# Patient Record
Sex: MALE | Race: WHITE | Employment: FULL TIME | URBAN - METROPOLITAN AREA
[De-identification: names, ages, dates, MRNs, and addresses within clinical notes are randomized per-mention and may not be internally consistent; named-entity substitution may affect disease eponyms.]

---

## 2017-03-10 ENCOUNTER — MED REC SCAN ONLY (OUTPATIENT)
Dept: INTERNAL MEDICINE CLINIC | Facility: CLINIC | Age: 37
End: 2017-03-10

## 2017-03-13 ENCOUNTER — TELEPHONE (OUTPATIENT)
Dept: INTERNAL MEDICINE CLINIC | Facility: CLINIC | Age: 37
End: 2017-03-13

## 2017-03-13 NOTE — TELEPHONE ENCOUNTER
Dr. Lizz Adam reviewed Wellness labs and recommended pt f/u in the office for physical.    Grant Elias for pt [facility] and schedule.

## 2017-08-18 PROBLEM — J30.89 ALLERGIC RHINITIS CAUSED BY MOLD: Status: ACTIVE | Noted: 2017-08-18

## 2017-11-30 ENCOUNTER — APPOINTMENT (OUTPATIENT)
Dept: GENERAL RADIOLOGY | Facility: HOSPITAL | Age: 37
End: 2017-11-30
Attending: EMERGENCY MEDICINE
Payer: COMMERCIAL

## 2017-11-30 ENCOUNTER — HOSPITAL ENCOUNTER (EMERGENCY)
Facility: HOSPITAL | Age: 37
Discharge: HOME | End: 2017-11-30
Attending: EMERGENCY MEDICINE
Payer: COMMERCIAL

## 2017-11-30 VITALS
BODY MASS INDEX: 29.4 KG/M2 | WEIGHT: 210 LBS | OXYGEN SATURATION: 99 % | HEART RATE: 98 BPM | SYSTOLIC BLOOD PRESSURE: 121 MMHG | DIASTOLIC BLOOD PRESSURE: 66 MMHG | HEIGHT: 71 IN | RESPIRATION RATE: 16 BRPM | TEMPERATURE: 97 F

## 2017-11-30 DIAGNOSIS — R13.19 ESOPHAGEAL DYSPHAGIA: Primary | ICD-10-CM

## 2017-11-30 PROCEDURE — 99284 EMERGENCY DEPT VISIT MOD MDM: CPT

## 2017-11-30 PROCEDURE — 99283 EMERGENCY DEPT VISIT LOW MDM: CPT

## 2017-11-30 PROCEDURE — 71020 XR CHEST PA + LAT CHEST (CPT=71020): CPT | Performed by: EMERGENCY MEDICINE

## 2017-11-30 RX ORDER — PANTOPRAZOLE SODIUM 20 MG/1
20 TABLET, DELAYED RELEASE ORAL DAILY
Qty: 30 TABLET | Refills: 0 | Status: SHIPPED | OUTPATIENT
Start: 2017-11-30 | End: 2017-12-30

## 2017-11-30 NOTE — ED PROVIDER NOTES
Patient Seen in: [facility] Emergency Department    History   Patient presents with:  FB in Throat (GI, respiratory)    Stated Complaint: FOREIGN BODY ESOPHAGUS    HPI    61-year-old white male who presents to the emergency room today for complaint and appears in no acute discomfort or distress. Vital signs are stable he is afebrile    Head is normocephalic atraumatic conjunctiva is clear. Sclerae anicteric. Neck is supple.   Oral exam shows no posterior pharyngeal erythema or tonsillar adenopath Jia Vargas MD  [address] Johnny Lopez [phone]    In 2 days          Medications Prescribed:  Discharge Medication List as of 11/30/2017  4:25 PM    START taking these medications    Pantoprazole Sodium 20 MG Oral Tab EC  Take 1 tablet (2

## 2017-11-30 NOTE — ED INITIAL ASSESSMENT (HPI)
Patient had a piece of chicken for dinner, felt a piece of bone get stuck when he swallowed. Patient was able to eat and drink without difficulty this morning.   + pain when swallowing today at lunch. Now feels something is stuck again.    Continues to be

## 2017-11-30 NOTE — ED NOTES
Pt reevaluated by er physician. Informed of his test report and plan of care. Pt verbalizing understanding.

## 2018-01-06 ENCOUNTER — APPOINTMENT (OUTPATIENT)
Dept: CT IMAGING | Facility: HOSPITAL | Age: 38
End: 2018-01-06
Attending: EMERGENCY MEDICINE
Payer: COMMERCIAL

## 2018-01-06 ENCOUNTER — HOSPITAL ENCOUNTER (OUTPATIENT)
Facility: HOSPITAL | Age: 38
Setting detail: OBSERVATION
Discharge: HOME | End: 2018-01-07
Attending: EMERGENCY MEDICINE | Admitting: INTERNAL MEDICINE
Payer: COMMERCIAL

## 2018-01-06 DIAGNOSIS — K52.9 GASTROENTERITIS: Primary | ICD-10-CM

## 2018-01-06 DIAGNOSIS — K12.2 UVULITIS: ICD-10-CM

## 2018-01-06 DIAGNOSIS — R11.2 INTRACTABLE VOMITING WITH NAUSEA, UNSPECIFIED VOMITING TYPE: ICD-10-CM

## 2018-01-06 DIAGNOSIS — R10.9 ABDOMINAL PAIN, ACUTE: ICD-10-CM

## 2018-01-06 DIAGNOSIS — E86.0 DEHYDRATION: ICD-10-CM

## 2018-01-06 LAB
ALBUMIN SERPL-MCNC: 4.6 G/DL (ref 3.5–4.8)
ALP LIVER SERPL-CCNC: 55 U/L (ref 45–117)
ALT SERPL-CCNC: 32 U/L (ref 17–63)
AST SERPL-CCNC: 18 U/L (ref 15–41)
BASOPHILS # BLD AUTO: 0.05 X10(3) UL (ref 0–0.1)
BASOPHILS NFR BLD AUTO: 0.3 %
BILIRUB SERPL-MCNC: 0.7 MG/DL (ref 0.1–2)
BUN BLD-MCNC: 21 MG/DL (ref 8–20)
CALCIUM BLD-MCNC: 9.6 MG/DL (ref 8.3–10.3)
CHLORIDE: 104 MMOL/L (ref 101–111)
CO2: 20 MMOL/L (ref 22–32)
CREAT BLD-MCNC: 1.46 MG/DL (ref 0.7–1.3)
EOSINOPHIL # BLD AUTO: 0.08 X10(3) UL (ref 0–0.3)
EOSINOPHIL NFR BLD AUTO: 0.5 %
ERYTHROCYTE [DISTWIDTH] IN BLOOD BY AUTOMATED COUNT: 12.5 % (ref 11.5–16)
GLUCOSE BLD-MCNC: 132 MG/DL (ref 70–99)
HCT VFR BLD AUTO: 48.4 % (ref 37–53)
HGB BLD-MCNC: 17.1 G/DL (ref 13–17)
IMMATURE GRANULOCYTE COUNT: 0.07 X10(3) UL (ref 0–1)
IMMATURE GRANULOCYTE RATIO %: 0.4 %
LYMPHOCYTES # BLD AUTO: 0.58 X10(3) UL (ref 0.9–4)
LYMPHOCYTES NFR BLD AUTO: 3.3 %
M PROTEIN MFR SERPL ELPH: 8.3 G/DL (ref 6.1–8.3)
MCH RBC QN AUTO: 30.2 PG (ref 27–33.2)
MCHC RBC AUTO-ENTMCNC: 35.3 G/DL (ref 31–37)
MCV RBC AUTO: 85.5 FL (ref 80–99)
MONOCYTES # BLD AUTO: 0.78 X10(3) UL (ref 0.1–0.6)
MONOCYTES NFR BLD AUTO: 4.4 %
NEUTROPHIL ABS PRELIM: 16.13 X10 (3) UL (ref 1.3–6.7)
NEUTROPHILS # BLD AUTO: 16.13 X10(3) UL (ref 1.3–6.7)
NEUTROPHILS NFR BLD AUTO: 91.1 %
PLATELET # BLD AUTO: 386 10(3)UL (ref 150–450)
POTASSIUM SERPL-SCNC: 3.6 MMOL/L (ref 3.6–5.1)
RBC # BLD AUTO: 5.66 X10(6)UL (ref 4.3–5.7)
RED CELL DISTRIBUTION WIDTH-SD: 38.8 FL (ref 35.1–46.3)
SODIUM SERPL-SCNC: 138 MMOL/L (ref 136–144)
WBC # BLD AUTO: 17.7 X10(3) UL (ref 4–13)

## 2018-01-06 PROCEDURE — 99220 INITIAL OBSERVATION CARE,LEVL III: CPT | Performed by: INTERNAL MEDICINE

## 2018-01-06 PROCEDURE — 74177 CT ABD & PELVIS W/CONTRAST: CPT | Performed by: EMERGENCY MEDICINE

## 2018-01-06 RX ORDER — PSEUDOEPHEDRINE HCL 120 MG/1
240 TABLET, FILM COATED, EXTENDED RELEASE ORAL DAILY
Status: DISCONTINUED | OUTPATIENT
Start: 2018-01-06 | End: 2018-01-07

## 2018-01-06 RX ORDER — MORPHINE SULFATE 2 MG/ML
2 INJECTION, SOLUTION INTRAMUSCULAR; INTRAVENOUS EVERY 30 MIN PRN
Status: DISCONTINUED | OUTPATIENT
Start: 2018-01-06 | End: 2018-01-07

## 2018-01-06 RX ORDER — SODIUM CHLORIDE 9 MG/ML
INJECTION, SOLUTION INTRAVENOUS CONTINUOUS
Status: DISCONTINUED | OUTPATIENT
Start: 2018-01-06 | End: 2018-01-07

## 2018-01-06 RX ORDER — METHYLPREDNISOLONE SODIUM SUCCINATE 125 MG/2ML
125 INJECTION, POWDER, LYOPHILIZED, FOR SOLUTION INTRAMUSCULAR; INTRAVENOUS ONCE
Status: COMPLETED | OUTPATIENT
Start: 2018-01-06 | End: 2018-01-06

## 2018-01-06 RX ORDER — ONDANSETRON 2 MG/ML
INJECTION INTRAMUSCULAR; INTRAVENOUS
Status: DISPENSED
Start: 2018-01-06 | End: 2018-01-06

## 2018-01-06 RX ORDER — METHYLPREDNISOLONE SODIUM SUCCINATE 125 MG/2ML
60 INJECTION, POWDER, LYOPHILIZED, FOR SOLUTION INTRAMUSCULAR; INTRAVENOUS EVERY 8 HOURS
Status: DISCONTINUED | OUTPATIENT
Start: 2018-01-06 | End: 2018-01-07

## 2018-01-06 RX ORDER — ONDANSETRON 2 MG/ML
4 INJECTION INTRAMUSCULAR; INTRAVENOUS ONCE
Status: COMPLETED | OUTPATIENT
Start: 2018-01-06 | End: 2018-01-06

## 2018-01-06 RX ORDER — ACETAMINOPHEN 325 MG/1
650 TABLET ORAL EVERY 6 HOURS PRN
Status: DISCONTINUED | OUTPATIENT
Start: 2018-01-06 | End: 2018-01-07

## 2018-01-06 RX ORDER — CETIRIZINE HYDROCHLORIDE 10 MG/1
10 TABLET ORAL DAILY
Status: DISCONTINUED | OUTPATIENT
Start: 2018-01-06 | End: 2018-01-07

## 2018-01-06 RX ORDER — ONDANSETRON 2 MG/ML
4 INJECTION INTRAMUSCULAR; INTRAVENOUS EVERY 4 HOURS PRN
Status: DISCONTINUED | OUTPATIENT
Start: 2018-01-06 | End: 2018-01-07

## 2018-01-06 RX ORDER — FAMOTIDINE 10 MG/ML
20 INJECTION, SOLUTION INTRAVENOUS ONCE
Status: COMPLETED | OUTPATIENT
Start: 2018-01-06 | End: 2018-01-06

## 2018-01-06 RX ORDER — METOCLOPRAMIDE HYDROCHLORIDE 5 MG/ML
10 INJECTION INTRAMUSCULAR; INTRAVENOUS EVERY 8 HOURS PRN
Status: DISCONTINUED | OUTPATIENT
Start: 2018-01-06 | End: 2018-01-07

## 2018-01-06 RX ORDER — DIPHENHYDRAMINE HYDROCHLORIDE 50 MG/ML
25 INJECTION INTRAMUSCULAR; INTRAVENOUS ONCE
Status: COMPLETED | OUTPATIENT
Start: 2018-01-06 | End: 2018-01-06

## 2018-01-06 RX ORDER — IBUPROFEN 600 MG/1
600 TABLET ORAL EVERY 4 HOURS PRN
Status: DISCONTINUED | OUTPATIENT
Start: 2018-01-06 | End: 2018-01-07

## 2018-01-06 RX ORDER — METOCLOPRAMIDE HYDROCHLORIDE 5 MG/ML
10 INJECTION INTRAMUSCULAR; INTRAVENOUS ONCE
Status: COMPLETED | OUTPATIENT
Start: 2018-01-06 | End: 2018-01-06

## 2018-01-06 RX ORDER — IBUPROFEN 200 MG
200 TABLET ORAL EVERY 4 HOURS PRN
Status: DISCONTINUED | OUTPATIENT
Start: 2018-01-06 | End: 2018-01-07

## 2018-01-06 RX ORDER — FLUTICASONE PROPIONATE 50 MCG
2 SPRAY, SUSPENSION (ML) NASAL DAILY
Status: DISCONTINUED | OUTPATIENT
Start: 2018-01-06 | End: 2018-01-07

## 2018-01-06 RX ORDER — SIMETHICONE 80 MG
80 TABLET,CHEWABLE ORAL
Status: DISCONTINUED | OUTPATIENT
Start: 2018-01-07 | End: 2018-01-07

## 2018-01-06 RX ORDER — DIPHENHYDRAMINE HCL 25 MG
25 CAPSULE ORAL EVERY 4 HOURS PRN
Status: DISCONTINUED | OUTPATIENT
Start: 2018-01-06 | End: 2018-01-07

## 2018-01-06 RX ORDER — FAMOTIDINE 10 MG/ML
20 INJECTION, SOLUTION INTRAVENOUS 2 TIMES DAILY
Status: DISCONTINUED | OUTPATIENT
Start: 2018-01-06 | End: 2018-01-07

## 2018-01-06 RX ORDER — LORATADINE AND PSEUDOEPHEDRINE 10; 240 MG/1; MG/1
1 TABLET, EXTENDED RELEASE ORAL
Status: DISCONTINUED | OUTPATIENT
Start: 2018-01-06 | End: 2018-01-06 | Stop reason: SDUPTHER

## 2018-01-06 RX ORDER — SODIUM CHLORIDE 9 MG/ML
INJECTION, SOLUTION INTRAVENOUS CONTINUOUS
Status: ACTIVE | OUTPATIENT
Start: 2018-01-06 | End: 2018-01-06

## 2018-01-06 RX ORDER — HYDROMORPHONE HYDROCHLORIDE 1 MG/ML
0.5 INJECTION, SOLUTION INTRAMUSCULAR; INTRAVENOUS; SUBCUTANEOUS EVERY 30 MIN PRN
Status: DISCONTINUED | OUTPATIENT
Start: 2018-01-06 | End: 2018-01-06

## 2018-01-06 RX ORDER — IBUPROFEN 400 MG/1
400 TABLET ORAL EVERY 4 HOURS PRN
Status: DISCONTINUED | OUTPATIENT
Start: 2018-01-06 | End: 2018-01-07

## 2018-01-06 RX ORDER — DIPHENHYDRAMINE HYDROCHLORIDE 50 MG/ML
12.5 INJECTION INTRAMUSCULAR; INTRAVENOUS EVERY 4 HOURS PRN
Status: DISCONTINUED | OUTPATIENT
Start: 2018-01-06 | End: 2018-01-07

## 2018-01-06 RX ORDER — HYDROXYZINE HYDROCHLORIDE 10 MG/1
10 TABLET, FILM COATED ORAL 3 TIMES DAILY PRN
Status: DISCONTINUED | OUTPATIENT
Start: 2018-01-06 | End: 2018-01-07

## 2018-01-06 NOTE — ED NOTES
Pt reports a sore throat. Per MD, pt has enlarged uvula. Pt sts he feels something in the back of his throat which started after he vomitted.

## 2018-01-06 NOTE — ED NOTES
Report to [facility]. Transport paged. Hospitalist at bedside.    Pt updated on eta to floor

## 2018-01-06 NOTE — ED NOTES
Pt call light on, sts he has to urinate. Pt sts he cannot walk. Strongly encouraged pt to walk. Pt anxiety increasing. Urinal provided.

## 2018-01-06 NOTE — PROGRESS NOTES
Sepsis bpa fired warning on epic. Pt afebrile. States \"I feel better. \" MD notified of sepsis warning.

## 2018-01-06 NOTE — ED PROVIDER NOTES
Patient Seen in: [facility] Emergency Department    History   Patient presents with:  Nausea/Vomiting/Diarrhea (gastrointestinal)    Stated Complaint: N/V/D    HPI    41-year-old male with a history of bronchitis, history none allergies, prior histo atraumatic. Pupils are equally round and reactive to light. Extraocular movements are intact. Oropharynx is clear. Uvula is midline. Tympanic membranes are clear without evidence of injection or perforation. Neck exam: Supple.   There is no lymphadeno hemoglobin 17.1, hematocrit 48.4, Plt 386    Glucose 132, Bun 21, Cr 1.46. CT scan abdomen pelvis shows no there is some appendicitis or acute inflammatory changes.   Bowel dilatation suggestive of gastroenteritis.  -------------------------------------- stable throughout the emergency department observation period. Findings  of the patient's tests results were discussed with the patient in detail. They were understanding of these results and their discharge instructions. All questions were answered.

## 2018-01-06 NOTE — H&P
[facility] HOSPITALIST  History and Physical     [facility] Patient Status:  Emergency    1980 MRN [id number]   Location [facility] Attending Rai Larson MD   Hosp Day # 0 PCP Chandu Magana MD     Chief Complaint Fluticasone Propionate (FLONASE) 50 MCG/ACT Nasal Suspension 2 sprays by Nasal route daily.  PRN Disp: 3 Bottle Rfl: 3   EPIPEN 0.3 MG/0.3ML Injection Solution Auto-injector Take as directed Disp: 2 each Rfl: 1   ONE-A-DAY MENS OR 1 QD Disp:  Rfl: improvement  2. Dehdyration   1. IVF  3. Acute Kidney Injury  1. Due to dehydration  2. Continue IVF  4. Viral Gastroenteritis  1.  Supportive care      Quality:  · DVT Prophylaxis: SCD, early ambulation  · CODE status: Full  · Amaro: none    Plan of care d

## 2018-01-06 NOTE — PLAN OF CARE
Problem: PAIN - ADULT  Goal: Verbalizes/displays adequate comfort level or patient's stated pain goal  INTERVENTIONS:  - Encourage pt to monitor pain and request assistance  - Assess pain using appropriate pain scale  - Administer analgesics based on type appropriate  - Identify discharge learning needs (meds, wound care, etc)  - Arrange for interpreters to assist at discharge as needed  - Consider post-discharge preferences of patient/family/discharge partner  - Complete POLST form as appropriate  - Assess

## 2018-01-06 NOTE — ED NOTES
Pt requesting anti-nausea meds prior to transport to CT. Pt continues to hyperventilate. Emotional support provided.

## 2018-01-06 NOTE — ED NOTES
Round on pt. Pt hyperventilating and very anxious. Emotional support provided. Warm blankets provided. Pt updated on poc. Denies any needs at this time.

## 2018-01-06 NOTE — ED NOTES
Call for report, RN not available. Pt updated on room number. Denies any needs. Sts nausea is under control. Anxiety and hyperventilating much better.

## 2018-01-07 VITALS
BODY MASS INDEX: 29 KG/M2 | TEMPERATURE: 98 F | WEIGHT: 208 LBS | RESPIRATION RATE: 18 BRPM | OXYGEN SATURATION: 98 % | SYSTOLIC BLOOD PRESSURE: 113 MMHG | DIASTOLIC BLOOD PRESSURE: 71 MMHG | HEART RATE: 96 BPM

## 2018-01-07 LAB
BASOPHILS # BLD AUTO: 0.01 X10(3) UL (ref 0–0.1)
BASOPHILS NFR BLD AUTO: 0.1 %
BUN BLD-MCNC: 16 MG/DL (ref 8–20)
CALCIUM BLD-MCNC: 7.9 MG/DL (ref 8.3–10.3)
CHLORIDE: 109 MMOL/L (ref 101–111)
CO2: 23 MMOL/L (ref 22–32)
CREAT BLD-MCNC: 0.95 MG/DL (ref 0.7–1.3)
EOSINOPHIL # BLD AUTO: 0 X10(3) UL (ref 0–0.3)
EOSINOPHIL NFR BLD AUTO: 0 %
ERYTHROCYTE [DISTWIDTH] IN BLOOD BY AUTOMATED COUNT: 13 % (ref 11.5–16)
GLUCOSE BLD-MCNC: 131 MG/DL (ref 70–99)
HCT VFR BLD AUTO: 37.1 % (ref 37–53)
HGB BLD-MCNC: 12.8 G/DL (ref 13–17)
IMMATURE GRANULOCYTE COUNT: 0.05 X10(3) UL (ref 0–1)
IMMATURE GRANULOCYTE RATIO %: 0.4 %
LYMPHOCYTES # BLD AUTO: 0.68 X10(3) UL (ref 0.9–4)
LYMPHOCYTES NFR BLD AUTO: 5.9 %
MCH RBC QN AUTO: 30.1 PG (ref 27–33.2)
MCHC RBC AUTO-ENTMCNC: 34.5 G/DL (ref 31–37)
MCV RBC AUTO: 87.3 FL (ref 80–99)
MONOCYTES # BLD AUTO: 0.35 X10(3) UL (ref 0.1–0.6)
MONOCYTES NFR BLD AUTO: 3 %
NEUTROPHIL ABS PRELIM: 10.51 X10 (3) UL (ref 1.3–6.7)
NEUTROPHILS # BLD AUTO: 10.51 X10(3) UL (ref 1.3–6.7)
NEUTROPHILS NFR BLD AUTO: 90.6 %
PLATELET # BLD AUTO: 276 10(3)UL (ref 150–450)
POTASSIUM SERPL-SCNC: 3.9 MMOL/L (ref 3.6–5.1)
RBC # BLD AUTO: 4.25 X10(6)UL (ref 4.3–5.7)
RED CELL DISTRIBUTION WIDTH-SD: 41.2 FL (ref 35.1–46.3)
SODIUM SERPL-SCNC: 140 MMOL/L (ref 136–144)
WBC # BLD AUTO: 11.6 X10(3) UL (ref 4–13)

## 2018-01-07 PROCEDURE — 99217 OBSERVATION CARE DISCHARGE: CPT | Performed by: INTERNAL MEDICINE

## 2018-01-07 RX ORDER — FAMOTIDINE 20 MG/1
20 TABLET ORAL 2 TIMES DAILY
Qty: 14 TABLET | Refills: 0 | Status: SHIPPED | OUTPATIENT
Start: 2018-01-07 | End: 2018-02-21 | Stop reason: ALTCHOICE

## 2018-01-07 RX ORDER — DIPHENHYDRAMINE HCL 25 MG
25 CAPSULE ORAL EVERY 4 HOURS PRN
Qty: 7 CAPSULE | Refills: 0 | Status: SHIPPED | OUTPATIENT
Start: 2018-01-07 | End: 2018-02-21 | Stop reason: ALTCHOICE

## 2018-01-07 RX ORDER — PREDNISONE 20 MG/1
40 TABLET ORAL DAILY
Qty: 10 TABLET | Refills: 0 | Status: SHIPPED | OUTPATIENT
Start: 2018-01-07 | End: 2018-01-12

## 2018-01-07 NOTE — DISCHARGE SUMMARY
[facility] HOSPITALIST  DISCHARGE SUMMARY     Valarie Faster  Patient Status:  Observation    1980 MRN [id number]   [facility] 3NW-A Attending Angella Ashton MD   Hosp Day # 0 PCP Chilo Newton MD     Date of Admission: 2018 will fnish steroids, pepcid and benadryl on DC.      Procedures during hospitalization:   • none    Incidental or significant findings and recommendations (brief descriptions):  • none    Lab/Test results pending at Discharge:   · none    Consultants:  • no your prescriptions at the location directed by your doctor or nurse    Bring a paper prescription for each of these medications  · predniSONE 20 MG Tabs         Follow-up appointment:   Kathie Rodriguez MD  [address] [place] [phone]

## 2018-01-07 NOTE — PROGRESS NOTES
NURSING DISCHARGE NOTE    Discharged pt via ambulation to [facility]. Accompanied by brother. Belongings at hand. IV catheter d/c'd; catheter intact. Discharge instruction and education given to pt and verbalizes understanding. Script given at hand.  A

## 2018-01-07 NOTE — PROGRESS NOTES
[facility] HOSPITALIST  Progress Note     Frank Glez Patient Status:  Observation    1980 MRN [id number]   [facility] 3NW-A Attending Karlos Aguirre MD   Hosp Day # 0 PCP Marybelle Mortimer, MD     Chief Complaint: n/v uvula swel And   • Pseudoephedrine HCl ER  240 mg Oral Daily   • simethicone  80 mg Oral TID CC and HS       ASSESSMENT / PLAN:     1. Uvulitis   1. IV steroids, benadryl, pepcid, transition to oral  2. CLD as tolerated  3. Pulse oximetry  2. Dehdyration         1.  I

## 2018-02-15 LAB
AMB EXT CHOLESTEROL, TOTAL: 160 MG/DL
AMB EXT CREATININE: 1.06 MG/DL
AMB EXT GFR: 90
AMB EXT GLUCOSE: 84 MG/DL
AMB EXT HDL CHOLESTEROL: 39 MG/DL
AMB EXT HEMATOCRIT: 43
AMB EXT HEMOGLOBIN: 14.4
AMB EXT HGBA1C: 5.3 %
AMB EXT LDL CHOLESTEROL, DIRECT: 102 MG/DL
AMB EXT MCV: 90
AMB EXT PLATELETS: 348
AMB EXT TRIGLYCERIDES: 94 MG/DL
AMB EXT WBC: 5.8 X10(3)UL

## 2018-02-20 ENCOUNTER — MED REC SCAN ONLY (OUTPATIENT)
Dept: INTERNAL MEDICINE CLINIC | Facility: CLINIC | Age: 38
End: 2018-02-20

## 2018-02-21 ENCOUNTER — OFFICE VISIT (OUTPATIENT)
Dept: INTERNAL MEDICINE CLINIC | Facility: CLINIC | Age: 38
End: 2018-02-21

## 2018-02-21 VITALS
BODY MASS INDEX: 29.47 KG/M2 | WEIGHT: 199 LBS | SYSTOLIC BLOOD PRESSURE: 106 MMHG | HEART RATE: 86 BPM | RESPIRATION RATE: 16 BRPM | TEMPERATURE: 99 F | HEIGHT: 69 IN | OXYGEN SATURATION: 98 % | DIASTOLIC BLOOD PRESSURE: 66 MMHG

## 2018-02-21 DIAGNOSIS — J02.9 SORE THROAT: ICD-10-CM

## 2018-02-21 DIAGNOSIS — H60.313 ACUTE DIFFUSE OTITIS EXTERNA OF BOTH EARS: ICD-10-CM

## 2018-02-21 DIAGNOSIS — J02.0 ACUTE STREPTOCOCCAL PHARYNGITIS: Primary | ICD-10-CM

## 2018-02-21 LAB
CONTROL LINE PRESENT WITH A CLEAR BACKGROUND (YES/NO): YES YES/NO
STREP GRP A CUL-SCR: POSITIVE

## 2018-02-21 PROCEDURE — 99213 OFFICE O/P EST LOW 20 MIN: CPT | Performed by: STUDENT IN AN ORGANIZED HEALTH CARE EDUCATION/TRAINING PROGRAM

## 2018-02-21 PROCEDURE — 87880 STREP A ASSAY W/OPTIC: CPT | Performed by: STUDENT IN AN ORGANIZED HEALTH CARE EDUCATION/TRAINING PROGRAM

## 2018-02-21 RX ORDER — AMOXICILLIN AND CLAVULANATE POTASSIUM 875; 125 MG/1; MG/1
1 TABLET, FILM COATED ORAL 2 TIMES DAILY
Qty: 20 TABLET | Refills: 0 | Status: SHIPPED | OUTPATIENT
Start: 2018-02-21 | End: 2018-03-03

## 2018-02-21 RX ORDER — FAMOTIDINE 20 MG/1
20 TABLET ORAL 2 TIMES DAILY
Refills: 0 | COMMUNITY
Start: 2018-01-07 | End: 2018-03-06

## 2018-02-21 RX ORDER — PANTOPRAZOLE SODIUM 20 MG/1
TABLET, DELAYED RELEASE ORAL
Refills: 0 | COMMUNITY
Start: 2017-11-30 | End: 2018-03-06

## 2018-02-21 RX ORDER — PREDNISONE 20 MG/1
20 TABLET ORAL 2 TIMES DAILY
Qty: 14 TABLET | Refills: 0 | Status: SHIPPED | OUTPATIENT
Start: 2018-02-21 | End: 2018-02-28

## 2018-02-21 NOTE — PATIENT INSTRUCTIONS
Pharyngitis: Strep (Confirmed)    You have had a positive test for strep throat. Strep throat is a contagious illness. It is spread by coughing, kissing or by touching others after touching your mouth or nose.  Symptoms include throat pain that is worse w · Lymph nodes getting larger or becoming soft in the middle  · You can't swallow liquids or you can't open your mouth wide because of throat pain  · Signs of dehydration. These include very dark urine or no urine, sunken eyes, and dizziness.   · Trouble erma · Ease pain with anesthetic sprays. Aspirin or an aspirin substitute also helps.  Remember, never give aspirin to anyone 25 or younger, or if you are already taking blood thinners.   · For sore throats caused by allergies, try antihistamines to block the al

## 2018-02-21 NOTE — PROGRESS NOTES
HPI:    Patient ID: Anastasia Coleman is a 40year old male. Sore Throat    This is a new problem. The current episode started yesterday. The problem has been gradually worsening. The pain is worse on the left side. There has been no fever.  The pa Skin: Negative. Negative for rash. Neurological: Negative. Negative for headaches. Psychiatric/Behavioral: Negative. Current Outpatient Prescriptions:  WAL-ITIN D 24 HOUR  MG Oral Tablet 24 Hr TAKE 1 TABLET BY MOUTH ONCE DAILY. heart sounds and intact distal pulses. Pulmonary/Chest: Effort normal and breath sounds normal.   Abdominal: Soft. Bowel sounds are normal.   Musculoskeletal: Normal range of motion. Lymphadenopathy:     He has cervical adenopathy.         Right cervic Alexey Pacheco MD

## 2018-03-06 ENCOUNTER — OFFICE VISIT (OUTPATIENT)
Dept: INTERNAL MEDICINE CLINIC | Facility: CLINIC | Age: 38
End: 2018-03-06

## 2018-03-06 VITALS
HEIGHT: 69 IN | BODY MASS INDEX: 29.62 KG/M2 | DIASTOLIC BLOOD PRESSURE: 70 MMHG | SYSTOLIC BLOOD PRESSURE: 100 MMHG | HEART RATE: 80 BPM | TEMPERATURE: 98 F | RESPIRATION RATE: 16 BRPM | WEIGHT: 200 LBS

## 2018-03-06 DIAGNOSIS — Z00.00 PHYSICAL EXAM, ANNUAL: Primary | ICD-10-CM

## 2018-03-06 PROBLEM — R11.2 INTRACTABLE VOMITING WITH NAUSEA, UNSPECIFIED VOMITING TYPE: Status: RESOLVED | Noted: 2018-01-06 | Resolved: 2018-03-06

## 2018-03-06 PROBLEM — E86.0 DEHYDRATION: Status: RESOLVED | Noted: 2018-01-06 | Resolved: 2018-03-06

## 2018-03-06 PROBLEM — K52.9 GASTROENTERITIS: Status: RESOLVED | Noted: 2018-01-06 | Resolved: 2018-03-06

## 2018-03-06 PROBLEM — R10.9 ABDOMINAL PAIN, ACUTE: Status: RESOLVED | Noted: 2018-01-06 | Resolved: 2018-03-06

## 2018-03-06 PROCEDURE — 99395 PREV VISIT EST AGE 18-39: CPT | Performed by: INTERNAL MEDICINE

## 2018-03-06 RX ORDER — AMOXICILLIN AND CLAVULANATE POTASSIUM 875; 125 MG/1; MG/1
1 TABLET, FILM COATED ORAL 2 TIMES DAILY
Refills: 0 | COMMUNITY
Start: 2018-02-21 | End: 2018-03-06 | Stop reason: ALTCHOICE

## 2018-03-07 NOTE — PATIENT INSTRUCTIONS
Prevention Guidelines, Men Ages 25 to 44  Screening tests and vaccines are an important part of managing your health. Health counseling is essential, too. Below are guidelines for these, for men ages 25 to 44.  Talk with your healthcare provider to make s Hepatitis B Men at increased risk for infection – talk with your healthcare provider 3 doses over 6 months; second dose should be given 1 month after the first dose; the third dose should be given at least 2 months after the second dose and at least 4 lefty © 6534-4645 [address]. [address] [facility], [facility] [place]. All rights reserved. This information is not intended as a substitute for professional medical care. Always follow your healthcare professional's instructions.

## 2018-03-07 NOTE — PROGRESS NOTES
HPI:    Patient ID: Reva Pace is a 40year old male. HPI  Reva Pace is a 40year old male who presents for a complete physical exam.   HPI:   Pt complains of no further choking on food (greasy) since avoiding these triggers. Smokeless tobacco: Never Used                      Alcohol use: Yes              Comment: occ, 1-2 per week     Occ: yes. : no. Children: no.   Exercise:  twice per week, three times per week.   Diet: wa screening. The patient indicates understanding of these issues and agrees to the plan. The patient is asked to return for CPX in 12 m.     Review of Systems           Current Outpatient Prescriptions:  WAL-ITIN D 24 HOUR  MG Oral Tablet 24 Hr TAKE 1

## 2018-07-20 NOTE — Clinical Note
I don't see anything wrong. Had transient BOWMAN. [place] fine when I looked at him. Will check I/O and continue to monitor.
None needed

## 2018-10-16 ENCOUNTER — NURSE ONLY (OUTPATIENT)
Dept: INTERNAL MEDICINE CLINIC | Facility: CLINIC | Age: 38
End: 2018-10-16
Payer: COMMERCIAL

## 2018-10-16 DIAGNOSIS — Z23 FLU VACCINE NEED: Primary | ICD-10-CM

## 2018-10-16 PROCEDURE — 90686 IIV4 VACC NO PRSV 0.5 ML IM: CPT | Performed by: INTERNAL MEDICINE

## 2018-10-16 PROCEDURE — 90471 IMMUNIZATION ADMIN: CPT | Performed by: INTERNAL MEDICINE

## 2018-12-13 ENCOUNTER — HOSPITAL ENCOUNTER (EMERGENCY)
Facility: HOSPITAL | Age: 38
Discharge: HOME | End: 2018-12-13
Attending: EMERGENCY MEDICINE
Payer: COMMERCIAL

## 2018-12-13 ENCOUNTER — APPOINTMENT (OUTPATIENT)
Dept: GENERAL RADIOLOGY | Facility: HOSPITAL | Age: 38
End: 2018-12-13
Attending: EMERGENCY MEDICINE
Payer: COMMERCIAL

## 2018-12-13 ENCOUNTER — APPOINTMENT (OUTPATIENT)
Dept: ULTRASOUND IMAGING | Facility: HOSPITAL | Age: 38
End: 2018-12-13
Attending: EMERGENCY MEDICINE
Payer: COMMERCIAL

## 2018-12-13 ENCOUNTER — APPOINTMENT (OUTPATIENT)
Dept: CT IMAGING | Facility: HOSPITAL | Age: 38
End: 2018-12-13
Attending: EMERGENCY MEDICINE
Payer: COMMERCIAL

## 2018-12-13 VITALS
HEIGHT: 71.5 IN | BODY MASS INDEX: 27.93 KG/M2 | RESPIRATION RATE: 16 BRPM | TEMPERATURE: 98 F | WEIGHT: 204 LBS | SYSTOLIC BLOOD PRESSURE: 106 MMHG | OXYGEN SATURATION: 100 % | DIASTOLIC BLOOD PRESSURE: 66 MMHG | HEART RATE: 95 BPM

## 2018-12-13 DIAGNOSIS — K52.9 GASTROENTERITIS: Primary | ICD-10-CM

## 2018-12-13 PROCEDURE — 74176 CT ABD & PELVIS W/O CONTRAST: CPT | Performed by: EMERGENCY MEDICINE

## 2018-12-13 PROCEDURE — 71045 X-RAY EXAM CHEST 1 VIEW: CPT | Performed by: EMERGENCY MEDICINE

## 2018-12-13 PROCEDURE — 93005 ELECTROCARDIOGRAM TRACING: CPT

## 2018-12-13 PROCEDURE — 96361 HYDRATE IV INFUSION ADD-ON: CPT

## 2018-12-13 PROCEDURE — 76700 US EXAM ABDOM COMPLETE: CPT | Performed by: EMERGENCY MEDICINE

## 2018-12-13 PROCEDURE — 93010 ELECTROCARDIOGRAM REPORT: CPT

## 2018-12-13 PROCEDURE — 83690 ASSAY OF LIPASE: CPT | Performed by: EMERGENCY MEDICINE

## 2018-12-13 PROCEDURE — 85025 COMPLETE CBC W/AUTO DIFF WBC: CPT | Performed by: EMERGENCY MEDICINE

## 2018-12-13 PROCEDURE — 80053 COMPREHEN METABOLIC PANEL: CPT | Performed by: EMERGENCY MEDICINE

## 2018-12-13 PROCEDURE — 96375 TX/PRO/DX INJ NEW DRUG ADDON: CPT

## 2018-12-13 PROCEDURE — 99285 EMERGENCY DEPT VISIT HI MDM: CPT

## 2018-12-13 PROCEDURE — 81003 URINALYSIS AUTO W/O SCOPE: CPT | Performed by: EMERGENCY MEDICINE

## 2018-12-13 PROCEDURE — 96374 THER/PROPH/DIAG INJ IV PUSH: CPT

## 2018-12-13 RX ORDER — METOCLOPRAMIDE HYDROCHLORIDE 5 MG/ML
10 INJECTION INTRAMUSCULAR; INTRAVENOUS ONCE
Status: COMPLETED | OUTPATIENT
Start: 2018-12-13 | End: 2018-12-13

## 2018-12-13 RX ORDER — DICYCLOMINE HCL 20 MG
20 TABLET ORAL 4 TIMES DAILY PRN
Qty: 30 TABLET | Refills: 0 | Status: SHIPPED | OUTPATIENT
Start: 2018-12-13 | End: 2018-12-28

## 2018-12-13 RX ORDER — DIPHENHYDRAMINE HYDROCHLORIDE 50 MG/ML
25 INJECTION INTRAMUSCULAR; INTRAVENOUS ONCE
Status: COMPLETED | OUTPATIENT
Start: 2018-12-13 | End: 2018-12-13

## 2018-12-13 RX ORDER — ONDANSETRON 2 MG/ML
4 INJECTION INTRAMUSCULAR; INTRAVENOUS ONCE
Status: DISCONTINUED | OUTPATIENT
Start: 2018-12-13 | End: 2018-12-13

## 2018-12-13 RX ORDER — HYDROMORPHONE HYDROCHLORIDE 1 MG/ML
0.5 INJECTION, SOLUTION INTRAMUSCULAR; INTRAVENOUS; SUBCUTANEOUS ONCE
Status: COMPLETED | OUTPATIENT
Start: 2018-12-13 | End: 2018-12-13

## 2018-12-13 RX ORDER — DIPHENHYDRAMINE HYDROCHLORIDE 50 MG/ML
INJECTION INTRAMUSCULAR; INTRAVENOUS
Status: DISCONTINUED
Start: 2018-12-13 | End: 2018-12-13

## 2018-12-13 RX ORDER — KETOROLAC TROMETHAMINE 30 MG/ML
15 INJECTION, SOLUTION INTRAMUSCULAR; INTRAVENOUS ONCE
Status: COMPLETED | OUTPATIENT
Start: 2018-12-13 | End: 2018-12-13

## 2018-12-13 RX ORDER — ONDANSETRON 4 MG/1
4 TABLET, ORALLY DISINTEGRATING ORAL EVERY 4 HOURS PRN
Qty: 10 TABLET | Refills: 0 | Status: SHIPPED | OUTPATIENT
Start: 2018-12-13 | End: 2018-12-20

## 2018-12-13 NOTE — ED PROVIDER NOTES
Us Abdomen Complete (cpt=76700)    Result Date: 12/13/2018  PROCEDURE:  US ABDOMEN COMPLETE (CPT=76700)  COMPARISON:  [facility] US ABDOMEN COMPLETE (CPT=76700), 4/08/2016, 17:41. [facility] , CT ABDOMEN PELVIS IV CONTRAST, NO ORAL (ER), 1/06/2018, 8:14.   Nichol Mott FINDINGS:  LUNG BASES:  Lung bases are clear. LIVER:  Normal in shape and contour but limited evaluation without contrast. BILIARY:   Gallbladder is unremarkable in appearance. . SPLEEN:  Mild splenomegaly measuring 15.5 cm in longest dimension.  PANCREAS: inspiration. Dictated by: Timothy Choi MD on 12/13/2018 at 10:56     Approved by: Timothy Choi MD                Reviewed CT as above. No evidence of acute appendicitis. May be mesenteric adenitis. Treat symptomatically.   Findings were discussed with

## 2018-12-13 NOTE — ED PROVIDER NOTES
Patient Seen in: [facility] Emergency Department    History   Patient presents with:  Abdomen/Flank Pain (GI/)  Nausea/Vomiting/Diarrhea (gastrointestinal)    Stated Complaint: Abdominal pain, nausea and vomiting since last night    HPI    38-year murmurs. Abdomen is soft and mild to moderate epigastric and right upper quadrant tenderness no masses guarding or rebound. Without rebound or guarding. Extremities no clubbing cyanosis or edema. Skin there is no rash.   Neurologic exam is within normal of CT scan of the appendix at this is negative I believe this is gastroenteritis he will be discharged patient will be signed over to Dr. Vandana Blancas.   CT scan shows some possible mesenteric adenitis otherwise normal-appearing appendix diverticulosis without

## 2019-02-06 LAB
AMB EXT CHOL/HDL RATIO: 4.3
AMB EXT CHOLESTEROL, TOTAL: 177 MG/DL
AMB EXT CREATININE: 1.15 MG/DL
AMB EXT GFR: 81
AMB EXT GLUCOSE: 89 MG/DL
AMB EXT HDL CHOLESTEROL: 41 MG/DL
AMB EXT HEMATOCRIT: 44.2
AMB EXT HEMOGLOBIN: 14.6
AMB EXT HGBA1C: 5.2 %
AMB EXT LDL CHOLESTEROL, DIRECT: 113 MG/DL
AMB EXT MCV: 90
AMB EXT PLATELETS: 382
AMB EXT TRIGLYCERIDES: 117 MG/DL
AMB EXT WBC: 7.1 X10(3)UL

## 2019-02-08 ENCOUNTER — MED REC SCAN ONLY (OUTPATIENT)
Dept: INTERNAL MEDICINE CLINIC | Facility: CLINIC | Age: 39
End: 2019-02-08

## 2019-02-21 ENCOUNTER — OFFICE VISIT (OUTPATIENT)
Dept: INTERNAL MEDICINE CLINIC | Facility: CLINIC | Age: 39
End: 2019-02-21
Payer: COMMERCIAL

## 2019-02-21 VITALS
HEART RATE: 82 BPM | DIASTOLIC BLOOD PRESSURE: 72 MMHG | OXYGEN SATURATION: 97 % | WEIGHT: 211 LBS | BODY MASS INDEX: 30.55 KG/M2 | SYSTOLIC BLOOD PRESSURE: 122 MMHG | TEMPERATURE: 99 F | HEIGHT: 69.6 IN | RESPIRATION RATE: 16 BRPM

## 2019-02-21 DIAGNOSIS — J01.90 ACUTE BACTERIAL RHINOSINUSITIS: Primary | ICD-10-CM

## 2019-02-21 DIAGNOSIS — B96.89 ACUTE BACTERIAL RHINOSINUSITIS: Primary | ICD-10-CM

## 2019-02-21 PROCEDURE — 99213 OFFICE O/P EST LOW 20 MIN: CPT | Performed by: NURSE PRACTITIONER

## 2019-02-21 RX ORDER — PREDNISONE 20 MG/1
40 TABLET ORAL DAILY
Qty: 14 TABLET | Refills: 0 | Status: SHIPPED | OUTPATIENT
Start: 2019-02-21 | End: 2019-02-28

## 2019-02-21 RX ORDER — AMOXICILLIN AND CLAVULANATE POTASSIUM 875; 125 MG/1; MG/1
1 TABLET, FILM COATED ORAL 2 TIMES DAILY
Qty: 20 TABLET | Refills: 0 | Status: SHIPPED | OUTPATIENT
Start: 2019-02-21 | End: 2019-03-03

## 2019-02-21 NOTE — PROGRESS NOTES
HPI:    Patient ID: Lynn Carlson is a 41year old male. Patient presents with:  Sinus Problem: sinus congestion, runny nose, mild cough, swollen glands, ear pain, sore throat x2-3 days      Sinus Problem   This is a new problem.  The current e Current Outpatient Medications:  Amoxicillin-Pot Clavulanate 875-125 MG Oral Tab Take 1 tablet by mouth 2 (two) times daily for 10 days. Disp: 20 tablet Rfl: 0   predniSONE 20 MG Oral Tab Take 2 tablets (40 mg total) by mouth daily for 7 days.  Disp: 14 t TCHDLRATIO 4.30 02/06/2019     Lab Results   Component Value Date    A1C 5.2 02/06/2019     No results found for: T4F, TSH, TSHT4  No results found for: VITD, QVITD, VITD25, UKTA34YJ  No results found for: DELMY  No results found for: FOLIC, FOLATESER, FOLAT Instructions   Flonase 1 spray twice a day x 14 days  Increase water intake  Sudafed as needed for nasal congestion  Mucinex as needed for chest congestion        SHAUN Carl

## 2019-02-21 NOTE — PATIENT INSTRUCTIONS
Flonase 1 spray twice a day x 14 days  Increase water intake  Sudafed as needed for nasal congestion  Mucinex as needed for chest congestion

## 2019-03-12 ENCOUNTER — OFFICE VISIT (OUTPATIENT)
Dept: INTERNAL MEDICINE CLINIC | Facility: CLINIC | Age: 39
End: 2019-03-12
Payer: COMMERCIAL

## 2019-03-12 VITALS
SYSTOLIC BLOOD PRESSURE: 114 MMHG | WEIGHT: 207 LBS | BODY MASS INDEX: 31.37 KG/M2 | HEART RATE: 84 BPM | TEMPERATURE: 98 F | HEIGHT: 68 IN | RESPIRATION RATE: 16 BRPM | DIASTOLIC BLOOD PRESSURE: 70 MMHG

## 2019-03-12 DIAGNOSIS — Z00.00 ANNUAL PHYSICAL EXAM: Primary | ICD-10-CM

## 2019-03-12 PROCEDURE — 99395 PREV VISIT EST AGE 18-39: CPT | Performed by: INTERNAL MEDICINE

## 2019-03-12 NOTE — PROGRESS NOTES
HPI:    Patient ID: Corazon Paulson is a 41year old male. HPI  Corazon Paulson is a 41year old male who presents for a complete physical exam.   HPI:   Pt complains of nothing .  Normal state of health    PAST MEDICAL, SOCIAL, FAMILY HI Unspecified testicular dysfunction       No past surgical history on file.    Family History   Problem Relation Age of Onset   • Heart Disease Father 64        angioplasty      Social History:  Social History    Tobacco Use      Smoking status: Never Smoker recommended low fat diet and aerobic exercise 30 minutes three times weekly. Health maintenance, will check fasting Lipids, CMP, CBC. Pt info handouts given for: exercise, low fat diet, testicular self exam and prostate cancer screening.    The patient in

## 2019-07-02 ENCOUNTER — OFFICE VISIT (OUTPATIENT)
Dept: INTERNAL MEDICINE CLINIC | Facility: CLINIC | Age: 39
End: 2019-07-02
Payer: COMMERCIAL

## 2019-07-02 VITALS
TEMPERATURE: 98 F | DIASTOLIC BLOOD PRESSURE: 76 MMHG | BODY MASS INDEX: 32.13 KG/M2 | WEIGHT: 212 LBS | HEIGHT: 68 IN | SYSTOLIC BLOOD PRESSURE: 114 MMHG | RESPIRATION RATE: 16 BRPM | HEART RATE: 101 BPM

## 2019-07-02 DIAGNOSIS — R10.33 PERIUMBILICAL ABDOMINAL PAIN: Primary | ICD-10-CM

## 2019-07-02 PROCEDURE — 99213 OFFICE O/P EST LOW 20 MIN: CPT | Performed by: PHYSICIAN ASSISTANT

## 2019-07-02 NOTE — PROGRESS NOTES
HPI:    Patient ID: Leisa Heredia is a 41year old male. Patient presents with:  Abdominal Pain: started about 3 weeks ago, abdominal pain above navel, tender to the touch, no fever or nausea     Abdominal Pain   This is a new problem.  Episode o BEDTIME Disp: 90 tablet Rfl: 3   ONE-A-DAY MENS OR 1 QD Disp:  Rfl:      Allergies:   Allergy                 ANAPHYLAXIS    Comment:BEANS, LEGUMES  Nuts                    ANAPHYLAXIS  Radiology Contrast *    OTHER (SEE COMMENTS)    Comment:Swollen uvula

## 2019-07-03 ENCOUNTER — HOSPITAL ENCOUNTER (OUTPATIENT)
Dept: ULTRASOUND IMAGING | Facility: HOSPITAL | Age: 39
Discharge: HOME | End: 2019-07-03
Attending: PHYSICIAN ASSISTANT
Payer: COMMERCIAL

## 2019-07-03 DIAGNOSIS — R10.33 PERIUMBILICAL ABDOMINAL PAIN: ICD-10-CM

## 2019-07-03 DIAGNOSIS — K43.9 VENTRAL HERNIA WITHOUT OBSTRUCTION OR GANGRENE: Primary | ICD-10-CM

## 2019-07-03 PROCEDURE — 76700 US EXAM ABDOM COMPLETE: CPT | Performed by: PHYSICIAN ASSISTANT

## 2019-07-03 NOTE — PROGRESS NOTES
Pt informed of result. Referral placed to Dr. Dong Davison, he will schedule asap. He reports some soreness after the ultrasound but no acute pain; soreness dissipates with lying down.  Agrees to go to ER if any worsened pain, inability to pass gas or stool, vo

## 2019-07-09 ENCOUNTER — TELEPHONE (OUTPATIENT)
Dept: SURGERY | Facility: CLINIC | Age: 39
End: 2019-07-09

## 2019-07-09 ENCOUNTER — OFFICE VISIT (OUTPATIENT)
Dept: SURGERY | Facility: CLINIC | Age: 39
End: 2019-07-09
Payer: COMMERCIAL

## 2019-07-09 VITALS
HEIGHT: 71 IN | SYSTOLIC BLOOD PRESSURE: 129 MMHG | HEART RATE: 97 BPM | DIASTOLIC BLOOD PRESSURE: 87 MMHG | BODY MASS INDEX: 29.68 KG/M2 | WEIGHT: 212 LBS

## 2019-07-09 DIAGNOSIS — K43.9 VENTRAL HERNIA WITHOUT OBSTRUCTION OR GANGRENE: Primary | ICD-10-CM

## 2019-07-09 PROCEDURE — 99243 OFF/OP CNSLTJ NEW/EST LOW 30: CPT | Performed by: SURGERY

## 2019-07-09 NOTE — H&P (VIEW-ONLY)
New Patient Visit Note       Active Problems      1. Ventral hernia without obstruction or gangrene        Chief Complaint   Patient presents with:  Consult: f/u from ER visit -ventral hernia, surgery consult.  states that hernia is painful      History of Spouse name: Not on file      Number of children: Not on file      Years of education: Not on file      Highest education level: Not on file    Tobacco Use      Smoking status: Never Smoker      Smokeless tobacco: Never Used    Substance and Sexual Act Hematological: Negative for adenopathy. Does not bruise/bleed easily. Psychiatric/Behavioral: Negative for behavioral problems and sleep disturbance.        Physical Findings   /87 (BP Location: Right arm, Patient Position: Standing, Cuff Size: adul

## 2019-07-09 NOTE — H&P
New Patient Visit Note       Active Problems      1. Ventral hernia without obstruction or gangrene        Chief Complaint   Patient presents with:  Consult: f/u from ER visit -ventral hernia, surgery consult.  states that hernia is painful      History of file      Number of children: Not on file      Years of education: Not on file      Highest education level: Not on file    Tobacco Use      Smoking status: Never Smoker      Smokeless tobacco: Never Used    Substance and Sexual Activity      Alcohol use: bruise/bleed easily. Psychiatric/Behavioral: Negative for behavioral problems and sleep disturbance.        Physical Findings   /87 (BP Location: Right arm, Patient Position: Standing, Cuff Size: adult)   Pulse 97   Ht 71\"   Wt 212 lb   BMI 29.57 k

## 2019-07-10 ENCOUNTER — TELEPHONE (OUTPATIENT)
Dept: SURGERY | Facility: CLINIC | Age: 39
End: 2019-07-10

## 2019-07-11 ENCOUNTER — ANESTHESIA EVENT (OUTPATIENT)
Dept: SURGERY | Facility: HOSPITAL | Age: 39
End: 2019-07-11

## 2019-07-11 NOTE — ANESTHESIA PREPROCEDURE EVALUATION
PRE-OP EVALUATION    Patient Name: Jenaro Glez    Pre-op Diagnosis: Ventral hernia without obstruction or gangrene [K43.9]    Procedure(s):  VENTRAL HERNIA REPAIR WITH MESH    Surgeon(s) and Role:     * Jazmyn Eugene MD - Primary    Pr >3 FB  TM distance: 4 - 6 cm  Neck ROM: full Cardiovascular    Cardiovascular exam normal.         Dental    No notable dental history.          Pulmonary    Pulmonary exam normal.                 Other findings            ASA: 2   Plan: general  NPO status

## 2019-07-12 ENCOUNTER — HOSPITAL ENCOUNTER (OUTPATIENT)
Facility: HOSPITAL | Age: 39
Setting detail: HOSPITAL OUTPATIENT SURGERY
Discharge: HOME | End: 2019-07-12
Attending: COLON & RECTAL SURGERY | Admitting: COLON & RECTAL SURGERY
Payer: COMMERCIAL

## 2019-07-12 ENCOUNTER — ANESTHESIA (OUTPATIENT)
Dept: SURGERY | Facility: HOSPITAL | Age: 39
End: 2019-07-12

## 2019-07-12 VITALS
WEIGHT: 215.81 LBS | TEMPERATURE: 97 F | RESPIRATION RATE: 16 BRPM | HEIGHT: 71 IN | DIASTOLIC BLOOD PRESSURE: 83 MMHG | HEART RATE: 89 BPM | BODY MASS INDEX: 30.21 KG/M2 | OXYGEN SATURATION: 97 % | SYSTOLIC BLOOD PRESSURE: 120 MMHG

## 2019-07-12 DIAGNOSIS — K43.9 VENTRAL HERNIA WITHOUT OBSTRUCTION OR GANGRENE: ICD-10-CM

## 2019-07-12 PROCEDURE — 0WQF0ZZ REPAIR ABDOMINAL WALL, OPEN APPROACH: ICD-10-PCS | Performed by: COLON & RECTAL SURGERY

## 2019-07-12 RX ORDER — HYDROMORPHONE HYDROCHLORIDE 1 MG/ML
0.4 INJECTION, SOLUTION INTRAMUSCULAR; INTRAVENOUS; SUBCUTANEOUS EVERY 5 MIN PRN
Status: DISCONTINUED | OUTPATIENT
Start: 2019-07-12 | End: 2019-07-12

## 2019-07-12 RX ORDER — BUPIVACAINE HYDROCHLORIDE AND EPINEPHRINE 2.5; 5 MG/ML; UG/ML
INJECTION, SOLUTION INFILTRATION; PERINEURAL AS NEEDED
Status: DISCONTINUED | OUTPATIENT
Start: 2019-07-12 | End: 2019-07-12 | Stop reason: HOSPADM

## 2019-07-12 RX ORDER — HYDROCODONE BITARTRATE AND ACETAMINOPHEN 5; 325 MG/1; MG/1
2 TABLET ORAL AS NEEDED
Status: DISCONTINUED | OUTPATIENT
Start: 2019-07-12 | End: 2019-07-12

## 2019-07-12 RX ORDER — NALOXONE HYDROCHLORIDE 0.4 MG/ML
80 INJECTION, SOLUTION INTRAMUSCULAR; INTRAVENOUS; SUBCUTANEOUS AS NEEDED
Status: DISCONTINUED | OUTPATIENT
Start: 2019-07-12 | End: 2019-07-12

## 2019-07-12 RX ORDER — ACETAMINOPHEN 500 MG
1000 TABLET ORAL ONCE
Status: DISCONTINUED | OUTPATIENT
Start: 2019-07-12 | End: 2019-07-12 | Stop reason: HOSPADM

## 2019-07-12 RX ORDER — CEFAZOLIN SODIUM/WATER 2 G/20 ML
2 SYRINGE (ML) INTRAVENOUS ONCE
Status: COMPLETED | OUTPATIENT
Start: 2019-07-12 | End: 2019-07-12

## 2019-07-12 RX ORDER — BACITRACIN 50000 [USP'U]/1
INJECTION, POWDER, LYOPHILIZED, FOR SOLUTION INTRAMUSCULAR AS NEEDED
Status: DISCONTINUED | OUTPATIENT
Start: 2019-07-12 | End: 2019-07-12 | Stop reason: HOSPADM

## 2019-07-12 RX ORDER — SODIUM CHLORIDE, SODIUM LACTATE, POTASSIUM CHLORIDE, CALCIUM CHLORIDE 600; 310; 30; 20 MG/100ML; MG/100ML; MG/100ML; MG/100ML
INJECTION, SOLUTION INTRAVENOUS CONTINUOUS
Status: DISCONTINUED | OUTPATIENT
Start: 2019-07-12 | End: 2019-07-12

## 2019-07-12 RX ORDER — ACETAMINOPHEN 500 MG
1000 TABLET ORAL AS NEEDED
COMMUNITY

## 2019-07-12 RX ORDER — HYDROCODONE BITARTRATE AND ACETAMINOPHEN 5; 325 MG/1; MG/1
1 TABLET ORAL AS NEEDED
Status: DISCONTINUED | OUTPATIENT
Start: 2019-07-12 | End: 2019-07-12

## 2019-07-12 RX ORDER — OXYCODONE HYDROCHLORIDE 5 MG/1
5 TABLET ORAL EVERY 6 HOURS PRN
Qty: 15 TABLET | Refills: 0 | Status: SHIPPED | OUTPATIENT
Start: 2019-07-12 | End: 2019-07-23

## 2019-07-12 RX ORDER — MEPERIDINE HYDROCHLORIDE 25 MG/ML
12.5 INJECTION INTRAMUSCULAR; INTRAVENOUS; SUBCUTANEOUS AS NEEDED
Status: DISCONTINUED | OUTPATIENT
Start: 2019-07-12 | End: 2019-07-12

## 2019-07-12 NOTE — ANESTHESIA POSTPROCEDURE EVALUATION
[address] [facility] Patient Status:  Hospital Outpatient Surgery   Age/Gender 41year old male MRN [id number]   [facility] SURGERY Attending Jazmyn Eugene MD   Hosp Day # 0 PCP Annamarie Nicholas MD       Anesthesia

## 2019-07-12 NOTE — OPERATIVE REPORT
[facility]  Operative Note    [facility]   CSN [id number] MRN [id number]    1980 Age 41year old   Admission Date 2019 Operation Date 2019   Attending Physician Monie Bucio MD Operating Physician V small to accommodate a mesh. It is, therefore, primarily repaired using #1 PDS suture in simple interrupted fashion. The wound is then cleansed and irrigated.  The deep subcutaneous layer is approximated with 2-0 vicryl The superficial subcutaneous tissue a

## 2019-07-12 NOTE — INTERVAL H&P NOTE
Pre-op Diagnosis: Ventral hernia without obstruction or gangrene [K43.9]    The above referenced H&P was reviewed by Ilda Homans, MD on 7/12/2019, the patient was examined and no significant changes have occurred in the patient's condition since t

## 2019-07-23 ENCOUNTER — OFFICE VISIT (OUTPATIENT)
Dept: SURGERY | Facility: CLINIC | Age: 39
End: 2019-07-23

## 2019-07-23 VITALS
TEMPERATURE: 98 F | HEART RATE: 89 BPM | BODY MASS INDEX: 29.68 KG/M2 | SYSTOLIC BLOOD PRESSURE: 121 MMHG | WEIGHT: 212 LBS | HEIGHT: 71 IN | DIASTOLIC BLOOD PRESSURE: 82 MMHG

## 2019-07-23 DIAGNOSIS — Z09 FOLLOW-UP EXAMINATION: Primary | ICD-10-CM

## 2019-07-23 DIAGNOSIS — K43.9 VENTRAL HERNIA WITHOUT OBSTRUCTION OR GANGRENE: ICD-10-CM

## 2019-07-23 PROCEDURE — 99024 POSTOP FOLLOW-UP VISIT: CPT | Performed by: COLON & RECTAL SURGERY

## 2019-07-24 NOTE — PATIENT INSTRUCTIONS
Assessment   Follow-up examination  (primary encounter diagnosis)  Ventral hernia without obstruction or gangrene      Plan   Patient is doing well 11 days status post primary hernia repair. No restrictions on diet. Continue walking frequently.  At 2

## 2019-07-24 NOTE — PROGRESS NOTES
Post Operative Visit Note       Active Problems  1. Follow-up examination    2.  Ventral hernia without obstruction or gangrene         Chief Complaint   Patient presents with:  Hernia: Mild pain         History of Present Illness   Jesús Denise Child Nasal Suspension 2 sprays by Nasal route daily. EPINEPHrine 0.3 MG/0.3ML Injection Solution Auto-injector Use s directed for anaphylaxis, then call 911. May dispense brand or generic. 1 tia.    HydrOXYzine HCl 10 MG Oral Tab TAKE 1 TABLET BY MOUTH MAGDA Abdominal:   Steri-Strips removed at the bedside. The incision is clean dry and intact with few small punctate areas of overlying scab. No surrounding erythema. No expressible drainage. There is a subtle underlying healing ridge.    Musculoskeletal: N

## 2019-10-21 ENCOUNTER — NURSE ONLY (OUTPATIENT)
Dept: INTERNAL MEDICINE CLINIC | Facility: CLINIC | Age: 39
End: 2019-10-21
Payer: COMMERCIAL

## 2019-10-21 DIAGNOSIS — Z23 NEED FOR INFLUENZA VACCINATION: Primary | ICD-10-CM

## 2019-10-21 PROCEDURE — 90686 IIV4 VACC NO PRSV 0.5 ML IM: CPT | Performed by: INTERNAL MEDICINE

## 2019-10-21 PROCEDURE — 90471 IMMUNIZATION ADMIN: CPT | Performed by: INTERNAL MEDICINE

## 2020-02-05 LAB
AMB EXT CHOL/HDL RATIO: 4.3
AMB EXT CHOLESTEROL, TOTAL: 196 MG/DL
AMB EXT CREATININE: 1.1 MG/DL
AMB EXT GFR: 83
AMB EXT GLUCOSE: 90 MG/DL
AMB EXT HDL CHOLESTEROL: 46 MG/DL
AMB EXT HEMATOCRIT: 44.4
AMB EXT HEMOGLOBIN: 14.2
AMB EXT HGBA1C: 5.3 %
AMB EXT LDL CHOLESTEROL, DIRECT: 126 MG/DL
AMB EXT MCV: 90
AMB EXT PLATELETS: 350
AMB EXT TRIGLYCERIDES: 121 MG/DL
AMB EXT VLDL: 24 MG/DL
AMB EXT WBC: 6.9 X10(3)UL

## 2020-02-06 ENCOUNTER — MED REC SCAN ONLY (OUTPATIENT)
Dept: INTERNAL MEDICINE CLINIC | Facility: CLINIC | Age: 40
End: 2020-02-06

## 2020-02-10 ENCOUNTER — MED REC SCAN ONLY (OUTPATIENT)
Dept: INTERNAL MEDICINE CLINIC | Facility: CLINIC | Age: 40
End: 2020-02-10

## 2020-03-14 ENCOUNTER — OFFICE VISIT (OUTPATIENT)
Dept: INTERNAL MEDICINE CLINIC | Facility: CLINIC | Age: 40
End: 2020-03-14
Payer: COMMERCIAL

## 2020-03-14 VITALS
HEART RATE: 92 BPM | OXYGEN SATURATION: 98 % | DIASTOLIC BLOOD PRESSURE: 78 MMHG | BODY MASS INDEX: 32.58 KG/M2 | TEMPERATURE: 98 F | HEIGHT: 69 IN | SYSTOLIC BLOOD PRESSURE: 122 MMHG | WEIGHT: 220 LBS | RESPIRATION RATE: 16 BRPM

## 2020-03-14 DIAGNOSIS — Z00.00 ANNUAL PHYSICAL EXAM: Primary | ICD-10-CM

## 2020-03-14 DIAGNOSIS — J01.00 ACUTE NON-RECURRENT MAXILLARY SINUSITIS: ICD-10-CM

## 2020-03-14 PROBLEM — Z91.89 10 YEAR RISK OF MI OR STROKE < 7.5%: Status: ACTIVE | Noted: 2020-03-14

## 2020-03-14 PROCEDURE — 99395 PREV VISIT EST AGE 18-39: CPT | Performed by: INTERNAL MEDICINE

## 2020-03-14 PROCEDURE — 99213 OFFICE O/P EST LOW 20 MIN: CPT | Performed by: INTERNAL MEDICINE

## 2020-03-14 RX ORDER — AMOXICILLIN AND CLAVULANATE POTASSIUM 875; 125 MG/1; MG/1
1 TABLET, FILM COATED ORAL 2 TIMES DAILY
Qty: 20 TABLET | Refills: 0 | Status: SHIPPED | OUTPATIENT
Start: 2020-03-14 | End: 2020-03-24

## 2020-03-14 RX ORDER — PREDNISONE 20 MG/1
TABLET ORAL
Qty: 14 TABLET | Refills: 0 | Status: SHIPPED | OUTPATIENT
Start: 2020-03-14 | End: 2020-08-17 | Stop reason: ALTCHOICE

## 2020-03-14 NOTE — PROGRESS NOTES
HPI:    Patient ID: Teresita Wynn is a 46year old male. Ear Problem        Jesús De Jesus is a 46year old male who presents for a complete physical exam.   HPI:   Pt complains of sinus pain for 2 weeks. Maxillary. Dull.  Constant an Loratadine-Pseudoephedrine ER (WAL-ITIN D 24 HOUR)  MG Oral Tablet 24 Hr Take 1 tablet by mouth once daily.  90 tablet 1   • hydrOXYzine HCl 10 MG Oral Tab TAKE 1 TABLET BY MOUTH EVERY NIGHT AT BEDTIME 90 tablet 3   • Fluticasone Propionate 50 MCG/ACT kg)   SpO2 98%   BMI 32.49 kg/m²   Body mass index is 32.49 kg/m².    GENERAL: well developed, well nourished,in no apparent distress  SKIN: no rashes,no suspicious lesions  HEENT: atraumatic, normocephalic,ears and throat are clear, tender at maxillary sin daily. 3 Bottle 3   • ONE-A-DAY MENS OR 1 QD     • acetaminophen 500 MG Oral Tab Take 1,000 mg by mouth one time. Allergies:   Allergy                 ANAPHYLAXIS    Comment:BEANS, LEGUMES  Nuts                    ANAPHYLAXIS  Radiology Contrast *

## 2020-10-02 ENCOUNTER — NURSE ONLY (OUTPATIENT)
Dept: INTERNAL MEDICINE CLINIC | Facility: CLINIC | Age: 40
End: 2020-10-02
Payer: COMMERCIAL

## 2020-10-02 DIAGNOSIS — Z23 NEED FOR INFLUENZA VACCINATION: Primary | ICD-10-CM

## 2020-10-02 PROCEDURE — 90686 IIV4 VACC NO PRSV 0.5 ML IM: CPT | Performed by: INTERNAL MEDICINE

## 2020-10-02 PROCEDURE — 90471 IMMUNIZATION ADMIN: CPT | Performed by: INTERNAL MEDICINE

## 2020-12-10 ENCOUNTER — OFFICE VISIT (OUTPATIENT)
Dept: INTERNAL MEDICINE CLINIC | Facility: CLINIC | Age: 40
End: 2020-12-10
Payer: COMMERCIAL

## 2020-12-10 VITALS
OXYGEN SATURATION: 99 % | WEIGHT: 224 LBS | HEART RATE: 84 BPM | SYSTOLIC BLOOD PRESSURE: 132 MMHG | RESPIRATION RATE: 16 BRPM | HEIGHT: 69 IN | DIASTOLIC BLOOD PRESSURE: 82 MMHG | TEMPERATURE: 98 F | BODY MASS INDEX: 33.18 KG/M2

## 2020-12-10 DIAGNOSIS — N45.1 EPIDIDYMITIS: ICD-10-CM

## 2020-12-10 DIAGNOSIS — N50.811 PAIN IN RIGHT TESTICLE: Primary | ICD-10-CM

## 2020-12-10 PROCEDURE — 3079F DIAST BP 80-89 MM HG: CPT | Performed by: INTERNAL MEDICINE

## 2020-12-10 PROCEDURE — 3008F BODY MASS INDEX DOCD: CPT | Performed by: INTERNAL MEDICINE

## 2020-12-10 PROCEDURE — 99214 OFFICE O/P EST MOD 30 MIN: CPT | Performed by: INTERNAL MEDICINE

## 2020-12-10 PROCEDURE — 3075F SYST BP GE 130 - 139MM HG: CPT | Performed by: INTERNAL MEDICINE

## 2020-12-10 RX ORDER — DICLOFENAC SODIUM 75 MG/1
75 TABLET, DELAYED RELEASE ORAL 2 TIMES DAILY
Qty: 28 TABLET | Refills: 0 | Status: SHIPPED | OUTPATIENT
Start: 2020-12-10 | End: 2021-02-04

## 2020-12-10 NOTE — PROGRESS NOTES
HPI:    Patient ID: Carrillo Mayes is a 45year old male. Testicular Swelling  The patient's primary symptoms include testicular pain.  The patient's pertinent negatives include no genital injury, genital itching, genital lesions, pelvic pain, p Injection Solution Auto-injector USE AS DIRECTED FOR ANAPHYLAXIS, THEN CALL 911. Brand or generic ok. 2 each 1   • Loratadine-Pseudoephedrine ER (WAL-ITIN D 24 HOUR)  MG Oral Tablet 24 Hr Take 1 tablet by mouth once daily.  90 tablet 1   • hydrOXYzine (RFU=[id number])       [id number]

## 2020-12-10 NOTE — PATIENT INSTRUCTIONS
Epididymitis   Inflammation of the epididymis can cause pain and swelling in your scrotum. The epididymis is a small tube next to the testicle that stores sperm. Epididymitis is often caused by an infection.  In sexually active men, it is often caused by · Get some rest.  Rest in bed for the first few days until the fever, pain, and swelling get better. It may take several weeks for all of the swelling to go away. · Prevent constipation. Constipation can make you strain. This makes the pain worse.  Prevent

## 2020-12-15 ENCOUNTER — HOSPITAL ENCOUNTER (OUTPATIENT)
Dept: ULTRASOUND IMAGING | Age: 40
Discharge: HOME | End: 2020-12-15
Attending: INTERNAL MEDICINE
Payer: COMMERCIAL

## 2020-12-15 ENCOUNTER — TELEPHONE (OUTPATIENT)
Dept: INTERNAL MEDICINE CLINIC | Facility: CLINIC | Age: 40
End: 2020-12-15

## 2020-12-15 DIAGNOSIS — N50.811 PAIN IN RIGHT TESTICLE: ICD-10-CM

## 2020-12-15 DIAGNOSIS — N50.89 TESTICULAR MASS: Primary | ICD-10-CM

## 2020-12-15 PROCEDURE — 76870 US EXAM SCROTUM: CPT | Performed by: INTERNAL MEDICINE

## 2020-12-15 PROCEDURE — 93975 VASCULAR STUDY: CPT | Performed by: INTERNAL MEDICINE

## 2020-12-15 NOTE — TELEPHONE ENCOUNTER
----- Message from Shaye Grant MD sent at 12/15/2020  9:55 AM CST -----  Pt notified of results via phone and he understands  Please refer pt to Chris Canales (urology) for further eval and guidance on next steps

## 2020-12-15 NOTE — TELEPHONE ENCOUNTER
Shivam Das  [facility]  [facility] [phone]     Pt given about referral information. He was informed he can see anyone in the practice that has the soonest appt. He expressed understanding. Referral placed.

## 2021-01-05 ENCOUNTER — TELEPHONE (OUTPATIENT)
Dept: SURGERY | Facility: CLINIC | Age: 41
End: 2021-01-05

## 2021-01-05 ENCOUNTER — OFFICE VISIT (OUTPATIENT)
Dept: SURGERY | Facility: CLINIC | Age: 41
End: 2021-01-05
Payer: COMMERCIAL

## 2021-01-05 VITALS — DIASTOLIC BLOOD PRESSURE: 84 MMHG | HEART RATE: 109 BPM | SYSTOLIC BLOOD PRESSURE: 122 MMHG | TEMPERATURE: 97 F

## 2021-01-05 DIAGNOSIS — R82.90 URINE FINDING: Primary | ICD-10-CM

## 2021-01-05 DIAGNOSIS — Z01.818 PRE-OP EXAM: ICD-10-CM

## 2021-01-05 DIAGNOSIS — N50.89 TESTICULAR MASS: ICD-10-CM

## 2021-01-05 DIAGNOSIS — N50.89 TESTIS MASS: Primary | ICD-10-CM

## 2021-01-05 DIAGNOSIS — N50.89 TESTICULAR MASS: Primary | ICD-10-CM

## 2021-01-05 LAB
APPEARANCE: CLEAR
BACTERIA UR QL AUTO: NEGATIVE /HPF
BILIRUB UR QL: NEGATIVE
BILIRUBIN: 0.2
CLARITY UR: CLEAR
COLOR UR: YELLOW
GLUCOSE UR-MCNC: NEGATIVE MG/DL
KETONES UR-MCNC: NEGATIVE MG/DL
LEUKOCYTE ESTERASE UR QL STRIP.AUTO: NEGATIVE
MULTISTIX LOT#: 5077 NUMERIC
NITRITE UR QL STRIP.AUTO: NEGATIVE
PH UR: 6 [PH] (ref 5–8)
PH, URINE: 6 (ref 4.5–8)
PROT UR-MCNC: NEGATIVE MG/DL
RBC #/AREA URNS AUTO: 1 /HPF
SP GR UR STRIP: 1.02 (ref 1–1.03)
SPECIFIC GRAVITY: >=1.03 (ref 1–1.03)
URINE-COLOR: YELLOW
UROBILINOGEN UR STRIP-ACNC: <2
UROBILINOGEN,SEMI-QN: 0.2 MG/DL (ref 0–1.9)
WBC #/AREA URNS AUTO: 0 /HPF

## 2021-01-05 PROCEDURE — 3079F DIAST BP 80-89 MM HG: CPT | Performed by: UROLOGY

## 2021-01-05 PROCEDURE — 99243 OFF/OP CNSLTJ NEW/EST LOW 30: CPT | Performed by: UROLOGY

## 2021-01-05 PROCEDURE — 3074F SYST BP LT 130 MM HG: CPT | Performed by: UROLOGY

## 2021-01-05 PROCEDURE — 81003 URINALYSIS AUTO W/O SCOPE: CPT | Performed by: UROLOGY

## 2021-01-05 NOTE — TELEPHONE ENCOUNTER
Central scheduling called because there is a patient asking to schedule labs and an xray and they need orders. I verbally discussed this with RP and I placed an order for Serum AFP, beta hCG, CMP, CBC and chest x-ray.

## 2021-01-05 NOTE — PROGRESS NOTES
Rooming Clinician:     Carrillo Mayes is a 45year old male.   Patient presents with:  Consult: c/o mass on right testicle         HPI:     Patient comes to the office with his dad for evaluation of a 6 to 8-week course of pain in the right testis SYSTEMS:     GENERAL HEALTH: feels well otherwise  SKIN: denies any unusual skin lesions or rashes  RESPIRATORY: denies shortness of breath with exertion  CARDIOVASCULAR: denies chest pain on exertion  GI: denies abdominal pain and denies heartburn  : se by Technologist)     FINDINGS:  TESTES:  The right testicle has a heterogeneous echotexture with multiple nodular intra testicular lesions, with the 2 largest foci measuring 1.9 x 1.5 x 1.5 cm and 1.3 x 1.2 x 1.3 cm.   There is some relatively increased vas Kaley Lopez M.D.   Urology

## 2021-01-05 NOTE — TELEPHONE ENCOUNTER
Called patient this date and scheduled procedure for 1/13/21 at [facility].  Reviewed pre-op. Instructions with patient this date who voiced understanding. Also sent instructions thru My Chart this date.   Patient will call me with questions at [phone]

## 2021-01-05 NOTE — TELEPHONE ENCOUNTER
Mare Otero,  Could you please schedule this patient for surgery? Thank you!      Myke Deleon     Urology Surgery Request  Surgeon: Peggy Jacob  Location (if known): [place]  Procedure: Right radical orchiectomy  Anesthesia : gen  Time Frame: ASAP  Time required:

## 2021-01-06 ENCOUNTER — HOSPITAL ENCOUNTER (OUTPATIENT)
Dept: GENERAL RADIOLOGY | Facility: HOSPITAL | Age: 41
Discharge: HOME | End: 2021-01-06
Attending: UROLOGY
Payer: COMMERCIAL

## 2021-01-06 ENCOUNTER — LAB ENCOUNTER (OUTPATIENT)
Dept: LAB | Facility: HOSPITAL | Age: 41
End: 2021-01-06
Attending: UROLOGY
Payer: COMMERCIAL

## 2021-01-06 DIAGNOSIS — Z01.818 PRE-OP EXAM: ICD-10-CM

## 2021-01-06 DIAGNOSIS — N50.89 TESTICULAR MASS: ICD-10-CM

## 2021-01-06 LAB
AFP-TM SERPL-MCNC: 2.8 NG/ML (ref ?–8)
ALBUMIN SERPL-MCNC: 4 G/DL (ref 3.4–5)
ALBUMIN/GLOB SERPL: 1.4 {RATIO} (ref 1–2)
ALP LIVER SERPL-CCNC: 72 U/L
ALT SERPL-CCNC: 42 U/L
ANION GAP SERPL CALC-SCNC: 8 MMOL/L (ref 0–18)
AST SERPL-CCNC: 24 U/L (ref 15–37)
BASOPHILS # BLD AUTO: 0.04 X10(3) UL (ref 0–0.2)
BASOPHILS NFR BLD AUTO: 0.5 %
BILIRUB SERPL-MCNC: 0.4 MG/DL (ref 0.1–2)
BUN BLD-MCNC: 18 MG/DL (ref 7–18)
BUN/CREAT SERPL: 16.2 (ref 10–20)
CALCIUM BLD-MCNC: 9.2 MG/DL (ref 8.5–10.1)
CHLORIDE SERPL-SCNC: 106 MMOL/L (ref 98–112)
CO2 SERPL-SCNC: 25 MMOL/L (ref 21–32)
CREAT BLD-MCNC: 1.11 MG/DL
DEPRECATED RDW RBC AUTO: 41.3 FL (ref 35.1–46.3)
EOSINOPHIL # BLD AUTO: 0.13 X10(3) UL (ref 0–0.7)
EOSINOPHIL NFR BLD AUTO: 1.6 %
ERYTHROCYTE [DISTWIDTH] IN BLOOD BY AUTOMATED COUNT: 12.9 % (ref 11–15)
GLOBULIN PLAS-MCNC: 2.8 G/DL (ref 2.8–4.4)
GLUCOSE BLD-MCNC: 110 MG/DL (ref 70–99)
HCT VFR BLD AUTO: 41.5 %
HGB BLD-MCNC: 14.1 G/DL
IMM GRANULOCYTES # BLD AUTO: 0.02 X10(3) UL (ref 0–1)
IMM GRANULOCYTES NFR BLD: 0.2 %
LYMPHOCYTES # BLD AUTO: 1.64 X10(3) UL (ref 1–4)
LYMPHOCYTES NFR BLD AUTO: 20.3 %
M PROTEIN MFR SERPL ELPH: 6.8 G/DL (ref 6.4–8.2)
MCH RBC QN AUTO: 29.9 PG (ref 26–34)
MCHC RBC AUTO-ENTMCNC: 34 G/DL (ref 31–37)
MCV RBC AUTO: 87.9 FL
MONOCYTES # BLD AUTO: 0.53 X10(3) UL (ref 0.1–1)
MONOCYTES NFR BLD AUTO: 6.6 %
NEUTROPHILS # BLD AUTO: 5.72 X10 (3) UL (ref 1.5–7.7)
NEUTROPHILS # BLD AUTO: 5.72 X10(3) UL (ref 1.5–7.7)
NEUTROPHILS NFR BLD AUTO: 70.8 %
OSMOLALITY SERPL CALC.SUM OF ELEC: 291 MOSM/KG (ref 275–295)
PATIENT FASTING Y/N/NP: NO
PLATELET # BLD AUTO: 350 10(3)UL (ref 150–450)
POTASSIUM SERPL-SCNC: 4.2 MMOL/L (ref 3.5–5.1)
RBC # BLD AUTO: 4.72 X10(6)UL
SODIUM SERPL-SCNC: 139 MMOL/L (ref 136–145)
WBC # BLD AUTO: 8.1 X10(3) UL (ref 4–11)

## 2021-01-06 PROCEDURE — 80053 COMPREHEN METABOLIC PANEL: CPT

## 2021-01-06 PROCEDURE — 82105 ALPHA-FETOPROTEIN SERUM: CPT

## 2021-01-06 PROCEDURE — 71045 X-RAY EXAM CHEST 1 VIEW: CPT | Performed by: UROLOGY

## 2021-01-06 PROCEDURE — 36415 COLL VENOUS BLD VENIPUNCTURE: CPT

## 2021-01-06 PROCEDURE — 84704 HCG FREE BETACHAIN TEST: CPT

## 2021-01-06 PROCEDURE — 85025 COMPLETE CBC W/AUTO DIFF WBC: CPT

## 2021-01-06 RX ORDER — ACETAMINOPHEN 500 MG
1000 TABLET ORAL ONCE
Status: CANCELLED | OUTPATIENT
Start: 2021-01-06 | End: 2021-01-06

## 2021-01-06 NOTE — PROGRESS NOTES
Your recent chest x-ray AFP are normal.  Recommend follow up as scheduled.     Sincerely,  Sharon Daly MD

## 2021-01-06 NOTE — PROGRESS NOTES
Your recent urinalysis shows no red blood cells under the microscope and is normal.  Recommend follow up as scheduled    Sincerely,  Rai Crump MD

## 2021-01-08 LAB — BETA HCG QUANT (TUMOR MARKER): <1 IU/L

## 2021-01-10 ENCOUNTER — LAB ENCOUNTER (OUTPATIENT)
Dept: LAB | Facility: HOSPITAL | Age: 41
End: 2021-01-10
Attending: UROLOGY
Payer: COMMERCIAL

## 2021-01-10 DIAGNOSIS — Z01.812 PRE-PROCEDURE LAB EXAM: Primary | ICD-10-CM

## 2021-01-11 LAB — SARS-COV-2 RNA RESP QL NAA+PROBE: NOT DETECTED

## 2021-01-12 ENCOUNTER — ANESTHESIA EVENT (OUTPATIENT)
Dept: SURGERY | Facility: HOSPITAL | Age: 41
End: 2021-01-12
Payer: COMMERCIAL

## 2021-01-12 NOTE — H&P
The patient came to the office with his dad for evaluation of a 6 to 8-week course of pain in the right testis with mass. He had a recent scrotal ultrasound showing a mass within the right testicle. He has no history of injury to the genitalia.   No h rashes  RESPIRATORY: denies shortness of breath with exertion  CARDIOVASCULAR: denies chest pain on exertion  GI: denies abdominal pain and denies heartburn  : see HPI  NEURO: no sensory or motor complaint     EXAM:      /84 (BP Location: Right arm testicle has a heterogeneous echotexture with multiple nodular intra testicular lesions, with the 2 largest foci measuring 1.9 x 1.5 x 1.5 cm and 1.3 x 1.2 x 1.3 cm.   There is some relatively increased vascularity to the right testicle  in compared to the

## 2021-01-13 ENCOUNTER — TELEPHONE (OUTPATIENT)
Dept: SURGERY | Facility: CLINIC | Age: 41
End: 2021-01-13

## 2021-01-13 ENCOUNTER — APPOINTMENT (OUTPATIENT)
Dept: GENERAL RADIOLOGY | Facility: HOSPITAL | Age: 41
End: 2021-01-13
Attending: EMERGENCY MEDICINE
Payer: COMMERCIAL

## 2021-01-13 ENCOUNTER — APPOINTMENT (OUTPATIENT)
Dept: NUCLEAR MEDICINE | Facility: HOSPITAL | Age: 41
End: 2021-01-13
Attending: EMERGENCY MEDICINE
Payer: COMMERCIAL

## 2021-01-13 ENCOUNTER — ANESTHESIA (OUTPATIENT)
Dept: SURGERY | Facility: HOSPITAL | Age: 41
End: 2021-01-13
Payer: COMMERCIAL

## 2021-01-13 ENCOUNTER — HOSPITAL ENCOUNTER (OUTPATIENT)
Facility: HOSPITAL | Age: 41
Setting detail: HOSPITAL OUTPATIENT SURGERY
Discharge: ANOTHER HOSPITAL | End: 2021-01-13
Attending: UROLOGY | Admitting: UROLOGY
Payer: COMMERCIAL

## 2021-01-13 ENCOUNTER — HOSPITAL ENCOUNTER (EMERGENCY)
Facility: HOSPITAL | Age: 41
Discharge: HOME | End: 2021-01-13
Attending: EMERGENCY MEDICINE
Payer: COMMERCIAL

## 2021-01-13 VITALS
WEIGHT: 223 LBS | BODY MASS INDEX: 31.22 KG/M2 | DIASTOLIC BLOOD PRESSURE: 85 MMHG | RESPIRATION RATE: 18 BRPM | HEIGHT: 71 IN | OXYGEN SATURATION: 97 % | SYSTOLIC BLOOD PRESSURE: 138 MMHG | HEART RATE: 100 BPM | TEMPERATURE: 97 F

## 2021-01-13 VITALS
HEART RATE: 107 BPM | OXYGEN SATURATION: 96 % | RESPIRATION RATE: 24 BRPM | DIASTOLIC BLOOD PRESSURE: 86 MMHG | SYSTOLIC BLOOD PRESSURE: 127 MMHG | WEIGHT: 223.13 LBS | BODY MASS INDEX: 31 KG/M2

## 2021-01-13 DIAGNOSIS — N50.89 TESTIS MASS: ICD-10-CM

## 2021-01-13 DIAGNOSIS — R06.02 SHORTNESS OF BREATH: Primary | ICD-10-CM

## 2021-01-13 LAB
ALBUMIN SERPL-MCNC: 3.9 G/DL (ref 3.4–5)
ALBUMIN/GLOB SERPL: 1.2 {RATIO} (ref 1–2)
ALP LIVER SERPL-CCNC: 59 U/L
ALT SERPL-CCNC: 34 U/L
ANION GAP SERPL CALC-SCNC: 7 MMOL/L (ref 0–18)
AST SERPL-CCNC: 15 U/L (ref 15–37)
BASOPHILS # BLD AUTO: 0.01 X10(3) UL (ref 0–0.2)
BASOPHILS NFR BLD AUTO: 0.1 %
BILIRUB SERPL-MCNC: 0.4 MG/DL (ref 0.1–2)
BUN BLD-MCNC: 15 MG/DL (ref 7–18)
BUN/CREAT SERPL: 12.8 (ref 10–20)
CALCIUM BLD-MCNC: 9.3 MG/DL (ref 8.5–10.1)
CHLORIDE SERPL-SCNC: 106 MMOL/L (ref 98–112)
CO2 SERPL-SCNC: 23 MMOL/L (ref 21–32)
CREAT BLD-MCNC: 1.17 MG/DL
D-DIMER: 0.43 UG/ML FEU (ref ?–0.5)
DEPRECATED RDW RBC AUTO: 39.9 FL (ref 35.1–46.3)
EOSINOPHIL # BLD AUTO: 0 X10(3) UL (ref 0–0.7)
EOSINOPHIL NFR BLD AUTO: 0 %
ERYTHROCYTE [DISTWIDTH] IN BLOOD BY AUTOMATED COUNT: 12.7 % (ref 11–15)
GLOBULIN PLAS-MCNC: 3.2 G/DL (ref 2.8–4.4)
GLUCOSE BLD-MCNC: 102 MG/DL (ref 70–99)
HCT VFR BLD AUTO: 42.1 %
HGB BLD-MCNC: 14.7 G/DL
IMM GRANULOCYTES # BLD AUTO: 0.03 X10(3) UL (ref 0–1)
IMM GRANULOCYTES NFR BLD: 0.3 %
LYMPHOCYTES # BLD AUTO: 0.78 X10(3) UL (ref 1–4)
LYMPHOCYTES NFR BLD AUTO: 7.5 %
M PROTEIN MFR SERPL ELPH: 7.1 G/DL (ref 6.4–8.2)
MCH RBC QN AUTO: 30.5 PG (ref 26–34)
MCHC RBC AUTO-ENTMCNC: 34.9 G/DL (ref 31–37)
MCV RBC AUTO: 87.3 FL
MONOCYTES # BLD AUTO: 0.12 X10(3) UL (ref 0.1–1)
MONOCYTES NFR BLD AUTO: 1.1 %
NEUTROPHILS # BLD AUTO: 9.5 X10 (3) UL (ref 1.5–7.7)
NEUTROPHILS # BLD AUTO: 9.5 X10(3) UL (ref 1.5–7.7)
NEUTROPHILS NFR BLD AUTO: 91 %
OSMOLALITY SERPL CALC.SUM OF ELEC: 283 MOSM/KG (ref 275–295)
PLATELET # BLD AUTO: 361 10(3)UL (ref 150–450)
POTASSIUM SERPL-SCNC: 4.1 MMOL/L (ref 3.5–5.1)
RBC # BLD AUTO: 4.82 X10(6)UL
SODIUM SERPL-SCNC: 136 MMOL/L (ref 136–145)
TROPONIN I SERPL-MCNC: <0.045 NG/ML (ref ?–0.04)
WBC # BLD AUTO: 10.4 X10(3) UL (ref 4–11)

## 2021-01-13 PROCEDURE — 78580 LUNG PERFUSION IMAGING: CPT | Performed by: EMERGENCY MEDICINE

## 2021-01-13 PROCEDURE — 85025 COMPLETE CBC W/AUTO DIFF WBC: CPT | Performed by: EMERGENCY MEDICINE

## 2021-01-13 PROCEDURE — 93005 ELECTROCARDIOGRAM TRACING: CPT

## 2021-01-13 PROCEDURE — 93010 ELECTROCARDIOGRAM REPORT: CPT

## 2021-01-13 PROCEDURE — 54530 REMOVAL OF TESTIS: CPT | Performed by: UROLOGY

## 2021-01-13 PROCEDURE — 99285 EMERGENCY DEPT VISIT HI MDM: CPT

## 2021-01-13 PROCEDURE — 85379 FIBRIN DEGRADATION QUANT: CPT | Performed by: EMERGENCY MEDICINE

## 2021-01-13 PROCEDURE — 80053 COMPREHEN METABOLIC PANEL: CPT | Performed by: EMERGENCY MEDICINE

## 2021-01-13 PROCEDURE — 84484 ASSAY OF TROPONIN QUANT: CPT | Performed by: EMERGENCY MEDICINE

## 2021-01-13 PROCEDURE — 71045 X-RAY EXAM CHEST 1 VIEW: CPT | Performed by: EMERGENCY MEDICINE

## 2021-01-13 PROCEDURE — 0VBF0ZZ EXCISION OF RIGHT SPERMATIC CORD, OPEN APPROACH: ICD-10-PCS | Performed by: UROLOGY

## 2021-01-13 PROCEDURE — 0VT90ZZ RESECTION OF RIGHT TESTIS, OPEN APPROACH: ICD-10-PCS | Performed by: UROLOGY

## 2021-01-13 PROCEDURE — 36415 COLL VENOUS BLD VENIPUNCTURE: CPT

## 2021-01-13 PROCEDURE — 0VTJ0ZZ RESECTION OF RIGHT EPIDIDYMIS, OPEN APPROACH: ICD-10-PCS | Performed by: UROLOGY

## 2021-01-13 RX ORDER — HYDROMORPHONE HYDROCHLORIDE 1 MG/ML
0.4 INJECTION, SOLUTION INTRAMUSCULAR; INTRAVENOUS; SUBCUTANEOUS EVERY 5 MIN PRN
Status: DISCONTINUED | OUTPATIENT
Start: 2021-01-13 | End: 2021-01-13

## 2021-01-13 RX ORDER — HYDROCODONE BITARTRATE AND ACETAMINOPHEN 5; 325 MG/1; MG/1
1-2 TABLET ORAL EVERY 4 HOURS PRN
Qty: 20 TABLET | Refills: 0 | Status: SHIPPED | OUTPATIENT
Start: 2021-01-13 | End: 2021-02-04

## 2021-01-13 RX ORDER — BUPIVACAINE HYDROCHLORIDE 5 MG/ML
INJECTION, SOLUTION EPIDURAL; INTRACAUDAL AS NEEDED
Status: DISCONTINUED | OUTPATIENT
Start: 2021-01-13 | End: 2021-01-13 | Stop reason: HOSPADM

## 2021-01-13 RX ORDER — HYDROMORPHONE HYDROCHLORIDE 1 MG/ML
INJECTION, SOLUTION INTRAMUSCULAR; INTRAVENOUS; SUBCUTANEOUS
Status: COMPLETED
Start: 2021-01-13 | End: 2021-01-13

## 2021-01-13 RX ORDER — HYDROCODONE BITARTRATE AND ACETAMINOPHEN 5; 325 MG/1; MG/1
2 TABLET ORAL AS NEEDED
Status: COMPLETED | OUTPATIENT
Start: 2021-01-13 | End: 2021-01-13

## 2021-01-13 RX ORDER — KETOROLAC TROMETHAMINE 30 MG/ML
INJECTION, SOLUTION INTRAMUSCULAR; INTRAVENOUS AS NEEDED
Status: DISCONTINUED | OUTPATIENT
Start: 2021-01-13 | End: 2021-01-13 | Stop reason: SURG

## 2021-01-13 RX ORDER — MEPERIDINE HYDROCHLORIDE 25 MG/ML
12.5 INJECTION INTRAMUSCULAR; INTRAVENOUS; SUBCUTANEOUS AS NEEDED
Status: DISCONTINUED | OUTPATIENT
Start: 2021-01-13 | End: 2021-01-13

## 2021-01-13 RX ORDER — ONDANSETRON 2 MG/ML
4 INJECTION INTRAMUSCULAR; INTRAVENOUS AS NEEDED
Status: DISCONTINUED | OUTPATIENT
Start: 2021-01-13 | End: 2021-01-13

## 2021-01-13 RX ORDER — CEFAZOLIN SODIUM/WATER 2 G/20 ML
2 SYRINGE (ML) INTRAVENOUS ONCE
Status: COMPLETED | OUTPATIENT
Start: 2021-01-13 | End: 2021-01-13

## 2021-01-13 RX ORDER — METOCLOPRAMIDE HYDROCHLORIDE 5 MG/ML
10 INJECTION INTRAMUSCULAR; INTRAVENOUS AS NEEDED
Status: DISCONTINUED | OUTPATIENT
Start: 2021-01-13 | End: 2021-01-13

## 2021-01-13 RX ORDER — ACETAMINOPHEN 500 MG
1000 TABLET ORAL ONCE
Status: COMPLETED | OUTPATIENT
Start: 2021-01-13 | End: 2021-01-13

## 2021-01-13 RX ORDER — ONDANSETRON 2 MG/ML
INJECTION INTRAMUSCULAR; INTRAVENOUS AS NEEDED
Status: DISCONTINUED | OUTPATIENT
Start: 2021-01-13 | End: 2021-01-13 | Stop reason: SURG

## 2021-01-13 RX ORDER — MIDAZOLAM HYDROCHLORIDE 1 MG/ML
INJECTION INTRAMUSCULAR; INTRAVENOUS
Status: COMPLETED
Start: 2021-01-13 | End: 2021-01-13

## 2021-01-13 RX ORDER — NALOXONE HYDROCHLORIDE 0.4 MG/ML
80 INJECTION, SOLUTION INTRAMUSCULAR; INTRAVENOUS; SUBCUTANEOUS AS NEEDED
Status: DISCONTINUED | OUTPATIENT
Start: 2021-01-13 | End: 2021-01-13

## 2021-01-13 RX ORDER — HYDROCODONE BITARTRATE AND ACETAMINOPHEN 5; 325 MG/1; MG/1
1 TABLET ORAL AS NEEDED
Status: COMPLETED | OUTPATIENT
Start: 2021-01-13 | End: 2021-01-13

## 2021-01-13 RX ORDER — DIPHENHYDRAMINE HYDROCHLORIDE 50 MG/ML
12.5 INJECTION INTRAMUSCULAR; INTRAVENOUS AS NEEDED
Status: DISCONTINUED | OUTPATIENT
Start: 2021-01-13 | End: 2021-01-13

## 2021-01-13 RX ORDER — SODIUM CHLORIDE, SODIUM LACTATE, POTASSIUM CHLORIDE, CALCIUM CHLORIDE 600; 310; 30; 20 MG/100ML; MG/100ML; MG/100ML; MG/100ML
INJECTION, SOLUTION INTRAVENOUS CONTINUOUS
Status: DISCONTINUED | OUTPATIENT
Start: 2021-01-13 | End: 2021-01-13

## 2021-01-13 RX ORDER — MIDAZOLAM HYDROCHLORIDE 1 MG/ML
1 INJECTION INTRAMUSCULAR; INTRAVENOUS EVERY 5 MIN PRN
Status: DISCONTINUED | OUTPATIENT
Start: 2021-01-13 | End: 2021-01-13

## 2021-01-13 RX ORDER — DEXAMETHASONE SODIUM PHOSPHATE 4 MG/ML
VIAL (ML) INJECTION AS NEEDED
Status: DISCONTINUED | OUTPATIENT
Start: 2021-01-13 | End: 2021-01-13 | Stop reason: SURG

## 2021-01-13 RX ORDER — LIDOCAINE HYDROCHLORIDE 10 MG/ML
INJECTION, SOLUTION EPIDURAL; INFILTRATION; INTRACAUDAL; PERINEURAL AS NEEDED
Status: DISCONTINUED | OUTPATIENT
Start: 2021-01-13 | End: 2021-01-13 | Stop reason: SURG

## 2021-01-13 RX ADMIN — DEXAMETHASONE SODIUM PHOSPHATE 8 MG: 4 MG/ML VIAL (ML) INJECTION at 09:57:00

## 2021-01-13 RX ADMIN — KETOROLAC TROMETHAMINE 30 MG: 30 INJECTION, SOLUTION INTRAMUSCULAR; INTRAVENOUS at 11:02:00

## 2021-01-13 RX ADMIN — LIDOCAINE HYDROCHLORIDE 50 MG: 10 INJECTION, SOLUTION EPIDURAL; INFILTRATION; INTRACAUDAL; PERINEURAL at 09:51:00

## 2021-01-13 RX ADMIN — SODIUM CHLORIDE, SODIUM LACTATE, POTASSIUM CHLORIDE, CALCIUM CHLORIDE: 600; 310; 30; 20 INJECTION, SOLUTION INTRAVENOUS at 11:06:00

## 2021-01-13 RX ADMIN — ONDANSETRON 4 MG: 2 INJECTION INTRAMUSCULAR; INTRAVENOUS at 11:06:00

## 2021-01-13 RX ADMIN — CEFAZOLIN SODIUM/WATER 2 G: 2 G/20 ML SYRINGE (ML) INTRAVENOUS at 10:01:00

## 2021-01-13 NOTE — OR NURSING
At 1400 patient dressed to go home, Patient became diaphoretic felt nauseated , dizzy . Laid  patient flat attached to monitor. IV restarted LR. Vital signs stable.  Patient felt shallow breathing oxygen 100% room air oxygen applied 2 liter N.C, cool cloth

## 2021-01-13 NOTE — ANESTHESIA POSTPROCEDURE EVALUATION
[address] [facility] Patient Status:  Hospital Outpatient Surgery   Age/Gender 45year old male MRN [id number]   [facility] SURGERY Attending Cathi Joseph MD   Hosp Day # 0 PCP Shaye Grant MD       Anesthesia Post

## 2021-01-13 NOTE — TELEPHONE ENCOUNTER
RN called patient to set up appt. No answer. Left message to call back. Offer 1/21 Thursday 8:30, 9 or 9:30AM. Awaiting call back.     \"Follow-up with me in office next Thursday or Friday to check wound discuss pathology\" - RP

## 2021-01-13 NOTE — ED NOTES
EKG  Completed per Sridhar Luna PCT  Pt updated on poc. No distress noted. Pt denies any needs at this time. Family at bedside.

## 2021-01-13 NOTE — ED PROVIDER NOTES
Patient Seen in: [facility] Emergency Department      History   Patient presents with:  Postop/Procedure Problem    Stated Complaint:     HPI/Subjective:   HPI     Patient is a 26-year-old male who has history of testicular cancer.   Patient had surg noted above.     Physical Exam     ED Triage Vitals [01/13/21 1620]   /80   Pulse 102   Resp 22   Temp    Temp src    SpO2 96 %   O2 Device None (Room air)       Current:/86   Pulse 107   Resp 24   Wt 101.2 kg   SpO2 96%   BMI 31.12 kg/m² GREEN   RAINBOW DRAW GOLD     EKG    Rate, intervals and axes as noted on EKG Report.   Rate: 102  Rhythm: Sinus Rhythm  Reading: Sinus tachycardia, nonspecific ST changes              Chest x-ray unremarkable  VQ scan no evidence of PE, normal.       MDM

## 2021-01-13 NOTE — ED INITIAL ASSESSMENT (HPI)
Right orchiectomy. VSS. Prior to dc patient became diaphorectic with shallow breathing. IV w/LR infusing. 2L NC. Sending here for r/o PE. Pulse 100. Saturation to high 80s while asleep with 2L NC.  Pt currently on room air with an sPO2 of 96% resting comfor

## 2021-01-13 NOTE — OPERATIVE REPORT
Operative Note    Patient Name: Min Pina    Date of Procedure: 1/13/2021    Preoperative Diagnosis: Right testicular mass    Postoperative Diagnosis: Right testicular mass    Primary Surgeon: Aline Saenz. Yusef Dumont M.D.      Procedure Perform internal ring thus allowing radical orchiectomy to be performed. The spermatic cord was then clamped with curved 6 inch clamp and the proximal portion was then triply ligated with #1 silk, one used as the stick tied.   After the testicle and spermatic cord

## 2021-01-13 NOTE — OR NURSING
Patient c/o shallow breathing with oxygen on. Dr Tyshawn Davison notified and EKG ordered and reported to him. Patient falling asleep with oxygen on with pulse ox 86%. Encouraged deep breathing. Pulse in 100's. Dr Lorenza Erickson notified and orders to go to DAYNE Mabry

## 2021-01-13 NOTE — ANESTHESIA PROCEDURE NOTES
Airway  Urgency: elective      General Information and Staff    Patient location during procedure: OR  Anesthesiologist: Jay Bang MD  Performed: anesthesiologist     Indications and Patient Condition  Indications for airway management: anesthesia  Kylee

## 2021-01-13 NOTE — INTERVAL H&P NOTE
Pre-op Diagnosis: Testis mass [N50.89]    The above referenced H&P was reviewed by Terri Fontenot MD on 1/13/2021, the patient was examined and no significant changes have occurred in the patient's condition since the H&P was performed.   I discussed wit

## 2021-01-13 NOTE — ANESTHESIA PREPROCEDURE EVALUATION
PRE-OP EVALUATION    Patient Name: Jocelyn Glez    Pre-op Diagnosis: Testis mass [N50.89]    Procedure(s):  Right radical orchiectomy    Surgeon(s) and Role:     * Melissa Talbert MD - Primary    Pre-op vitals reviewed.   Temp: 97.8 °F (36.6 ° (-) angina              Endo/Other    Negative endo/other ROS. Pulmonary    Negative pulmonary ROS.           (-) shortness of breath            Neuro/Psych    Negative neuro/psych ROS.                                 P

## 2021-01-14 LAB
ATRIAL RATE: 100 BPM
ATRIAL RATE: 102 BPM
P AXIS: 24 DEGREES
P AXIS: 25 DEGREES
P-R INTERVAL: 188 MS
P-R INTERVAL: 192 MS
Q-T INTERVAL: 344 MS
Q-T INTERVAL: 350 MS
QRS DURATION: 90 MS
QRS DURATION: 90 MS
QTC CALCULATION (BEZET): 448 MS
QTC CALCULATION (BEZET): 451 MS
R AXIS: 10 DEGREES
R AXIS: 8 DEGREES
T AXIS: 66 DEGREES
T AXIS: 74 DEGREES
VENTRICULAR RATE: 100 BPM
VENTRICULAR RATE: 102 BPM

## 2021-01-15 NOTE — TELEPHONE ENCOUNTER
I called him back and explained that we were calling the pt to see if he wanted an earlier appt because we had several slots open up, but they have since been filled. I explained that we will see the pt at his appt on 1/22/2021.   Pt verbalized understandi

## 2021-01-15 NOTE — TELEPHONE ENCOUNTER
This encounter is now closed.  Patient is already on the schedule for 1/22 Friday at 11:15 AM.     \"Follow-up with me in office next Thursday or Friday to check wound discuss pathology\" - RP

## 2021-01-19 ENCOUNTER — TELEPHONE (OUTPATIENT)
Dept: SURGERY | Facility: CLINIC | Age: 41
End: 2021-01-19

## 2021-01-19 NOTE — TELEPHONE ENCOUNTER
I called the pt and he was questioning if his pathology results were back. I discussed that they are still in process and that they will be released to [facility] after it is finished processing. Pt verbalized understanding and all questions were answered.

## 2021-01-22 ENCOUNTER — OFFICE VISIT (OUTPATIENT)
Dept: SURGERY | Facility: CLINIC | Age: 41
End: 2021-01-22
Payer: COMMERCIAL

## 2021-01-22 DIAGNOSIS — C62.91 SEMINOMA OF RIGHT TESTIS, STAGE 1 (HCC): ICD-10-CM

## 2021-01-22 DIAGNOSIS — N50.89 TESTICULAR MASS: Primary | ICD-10-CM

## 2021-01-22 PROCEDURE — 99024 POSTOP FOLLOW-UP VISIT: CPT | Performed by: UROLOGY

## 2021-01-22 NOTE — PROGRESS NOTES
Rooming Clinician:     Radha Shabazz is a 45year old male. Patient presents with:  Surgical Followup: s/p right orchiectomy 1/13/21. c/o mild pain while sitting.  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  -Tumor nodules measure 1.8 cm and 1.5 cm, respectively.  -No definite lym

## 2021-01-25 ENCOUNTER — TELEPHONE (OUTPATIENT)
Dept: HEMATOLOGY/ONCOLOGY | Facility: HOSPITAL | Age: 41
End: 2021-01-25

## 2021-01-25 NOTE — TELEPHONE ENCOUNTER
LVM~Please earline new pt consultation with me next week on either wed 2/3 at 8:30am or thurs 2/4 at 12:15.  Advise pt to schedule and complete the CT scan (ordered by urology) prior to his visit with me if able. Referred by Dr. Rich Casiano for testicular cancer.

## 2021-01-26 ENCOUNTER — TELEPHONE (OUTPATIENT)
Dept: SURGERY | Facility: CLINIC | Age: 41
End: 2021-01-26

## 2021-01-26 NOTE — TELEPHONE ENCOUNTER
I called and spoke to a CT tech and they stated that they will give the pt information about how and when to take the oral contrast when he picks it up. I called the pt back and discussed this with him.  Pt verbalized understanding and all questions were

## 2021-01-26 NOTE — TELEPHONE ENCOUNTER
I called the patient and he stated that he was told to  oral contrast on Thursday before his CT on Friday. He stated that he would need a print out of his order.   I gave him our office hours for Thursday and told him that he can  his order p

## 2021-01-26 NOTE — TELEPHONE ENCOUNTER
Pt calling for instructions for CT CHEST+ABDOMEN+PELVIS(CPT=71250/34702) [[id number]] ([id number]) please advise

## 2021-01-29 ENCOUNTER — HOSPITAL ENCOUNTER (OUTPATIENT)
Dept: CT IMAGING | Facility: HOSPITAL | Age: 41
Discharge: HOME | End: 2021-01-29
Attending: UROLOGY
Payer: COMMERCIAL

## 2021-01-29 DIAGNOSIS — C62.91 SEMINOMA OF RIGHT TESTIS, STAGE 1 (HCC): ICD-10-CM

## 2021-01-29 PROCEDURE — 74176 CT ABD & PELVIS W/O CONTRAST: CPT | Performed by: UROLOGY

## 2021-01-29 PROCEDURE — 71250 CT THORAX DX C-: CPT | Performed by: UROLOGY

## 2021-02-04 ENCOUNTER — OFFICE VISIT (OUTPATIENT)
Dept: HEMATOLOGY/ONCOLOGY | Facility: HOSPITAL | Age: 41
End: 2021-02-04
Attending: INTERNAL MEDICINE
Payer: COMMERCIAL

## 2021-02-04 VITALS
WEIGHT: 225 LBS | HEIGHT: 70.98 IN | TEMPERATURE: 98 F | BODY MASS INDEX: 31.5 KG/M2 | OXYGEN SATURATION: 100 % | HEART RATE: 84 BPM | DIASTOLIC BLOOD PRESSURE: 87 MMHG | RESPIRATION RATE: 16 BRPM | SYSTOLIC BLOOD PRESSURE: 121 MMHG

## 2021-02-04 DIAGNOSIS — C62.91 CANCER, TESTIS, SEMINOMA, RIGHT (HCC): Primary | ICD-10-CM

## 2021-02-04 LAB
AFP-TM SERPL-MCNC: 1.6 NG/ML (ref ?–8)
ALBUMIN SERPL-MCNC: 4.3 G/DL (ref 3.4–5)
ALBUMIN/GLOB SERPL: 1.2 {RATIO} (ref 1–2)
ALP LIVER SERPL-CCNC: 64 U/L
ALT SERPL-CCNC: 35 U/L
ANION GAP SERPL CALC-SCNC: 5 MMOL/L (ref 0–18)
AST SERPL-CCNC: 14 U/L (ref 15–37)
BASOPHILS # BLD AUTO: 0.05 X10(3) UL (ref 0–0.2)
BASOPHILS NFR BLD AUTO: 0.6 %
BILIRUB SERPL-MCNC: 0.3 MG/DL (ref 0.1–2)
BUN BLD-MCNC: 19 MG/DL (ref 7–18)
BUN/CREAT SERPL: 17 (ref 10–20)
CALCIUM BLD-MCNC: 9.7 MG/DL (ref 8.5–10.1)
CHLORIDE SERPL-SCNC: 106 MMOL/L (ref 98–112)
CO2 SERPL-SCNC: 28 MMOL/L (ref 21–32)
CREAT BLD-MCNC: 1.12 MG/DL
DEPRECATED RDW RBC AUTO: 42.9 FL (ref 35.1–46.3)
EOSINOPHIL # BLD AUTO: 0.13 X10(3) UL (ref 0–0.7)
EOSINOPHIL NFR BLD AUTO: 1.6 %
ERYTHROCYTE [DISTWIDTH] IN BLOOD BY AUTOMATED COUNT: 13.1 % (ref 11–15)
GLOBULIN PLAS-MCNC: 3.6 G/DL (ref 2.8–4.4)
GLUCOSE BLD-MCNC: 86 MG/DL (ref 70–99)
HCT VFR BLD AUTO: 45.2 %
HGB BLD-MCNC: 15 G/DL
IMM GRANULOCYTES # BLD AUTO: 0.02 X10(3) UL (ref 0–1)
IMM GRANULOCYTES NFR BLD: 0.2 %
LDH SERPL L TO P-CCNC: 148 U/L
LYMPHOCYTES # BLD AUTO: 2.39 X10(3) UL (ref 1–4)
LYMPHOCYTES NFR BLD AUTO: 29.5 %
M PROTEIN MFR SERPL ELPH: 7.9 G/DL (ref 6.4–8.2)
MCH RBC QN AUTO: 30 PG (ref 26–34)
MCHC RBC AUTO-ENTMCNC: 33.2 G/DL (ref 31–37)
MCV RBC AUTO: 90.4 FL
MONOCYTES # BLD AUTO: 0.53 X10(3) UL (ref 0.1–1)
MONOCYTES NFR BLD AUTO: 6.5 %
NEUTROPHILS # BLD AUTO: 4.98 X10 (3) UL (ref 1.5–7.7)
NEUTROPHILS # BLD AUTO: 4.98 X10(3) UL (ref 1.5–7.7)
NEUTROPHILS NFR BLD AUTO: 61.6 %
OSMOLALITY SERPL CALC.SUM OF ELEC: 290 MOSM/KG (ref 275–295)
PATIENT FASTING Y/N/NP: NO
PLATELET # BLD AUTO: 395 10(3)UL (ref 150–450)
POTASSIUM SERPL-SCNC: 4.2 MMOL/L (ref 3.5–5.1)
RBC # BLD AUTO: 5 X10(6)UL
SODIUM SERPL-SCNC: 139 MMOL/L (ref 136–145)
WBC # BLD AUTO: 8.1 X10(3) UL (ref 4–11)

## 2021-02-04 PROCEDURE — 99245 OFF/OP CONSLTJ NEW/EST HI 55: CPT | Performed by: INTERNAL MEDICINE

## 2021-02-04 NOTE — PROGRESS NOTES
Hematology/Oncology Initial Consultation Note    Patient Name: [facility]  Medical Record Number: [id number]   YOB: 1980   Date of Consultation: 2/4/2021   PCP: Dr. Annamarie Nicholas  Other providers: Dr. Shannon Cuevas (urology)    Trudy Ulrich aches or pains other than at the surgical site. No fevers or bleeding. No bowel or bladder changes. He denies any history of neuropathy, renal, or lung disease. No cough or dyspnea.   He lives alone and during current COVID-19 pandemic he works from [employer] Comment:Swollen uvula    Psychosocial History:  Social History    Social History Narrative      Single, lives alone. Works as an . No children. Has a brother who lives in [place]. His parents live in [place].     Social History HGB 14.7 01/13/2021    HGB 14.1 01/06/2021    HGB 14.2 02/05/2020    HCT 42.1 01/13/2021    MCV 87.3 01/13/2021    MCH 30.5 01/13/2021    MCHC 34.9 01/13/2021    RDW 12.7 01/13/2021    .0 01/13/2021    .0 01/06/2021     02/05/2020 require him to obtain prechemotherapy PFTs, Port-A-Cath placement, and consider for sperm banking/fertility consultation.    -We will repeat tumor markers-LDH, hCG, AFP today to reassess    I will call patient tomorrow to discuss consensus recommendations

## 2021-02-06 ENCOUNTER — TELEPHONE (OUTPATIENT)
Dept: HEMATOLOGY/ONCOLOGY | Facility: HOSPITAL | Age: 41
End: 2021-02-06

## 2021-02-06 DIAGNOSIS — C62.91 CANCER, TESTIS, SEMINOMA, RIGHT (HCC): Primary | ICD-10-CM

## 2021-02-06 DIAGNOSIS — C62.91 CANCER, TESTIS, SEMINOMA, RIGHT (HCC): ICD-10-CM

## 2021-02-06 DIAGNOSIS — Z01.818 EXAMINATION PRIOR TO CHEMOTHERAPY: Primary | ICD-10-CM

## 2021-02-06 LAB — BETA HCG QUANT (TUMOR MARKER): <1 IU/L

## 2021-02-06 RX ORDER — ONDANSETRON HYDROCHLORIDE 8 MG/1
8 TABLET, FILM COATED ORAL EVERY 8 HOURS PRN
Qty: 30 TABLET | Refills: 3 | Status: SHIPPED | OUTPATIENT
Start: 2021-02-06 | End: 2021-05-10

## 2021-02-06 RX ORDER — PROCHLORPERAZINE MALEATE 10 MG
10 TABLET ORAL EVERY 6 HOURS PRN
Qty: 30 TABLET | Refills: 3 | Status: SHIPPED | OUTPATIENT
Start: 2021-02-06 | End: 2021-05-10

## 2021-02-06 NOTE — TELEPHONE ENCOUNTER
Case reviewed in multidisciplinary  tumor board with consensus recommendations that the retroperitoneal lymph nodes should be assumed to be involved, pulmonary nodules are suspicious for involvement as well.   Therefore recommendations are for chemotherap

## 2021-02-08 RX ORDER — DIPHENHYDRAMINE HCL 25 MG
50 CAPSULE ORAL AS DIRECTED
Qty: 2 CAPSULE | Refills: 0 | Status: SHIPPED | OUTPATIENT
Start: 2021-02-08 | End: 2021-02-15

## 2021-02-08 RX ORDER — PREDNISONE 50 MG/1
50 TABLET ORAL AS DIRECTED
Qty: 3 TABLET | Refills: 0 | Status: SHIPPED | OUTPATIENT
Start: 2021-02-08 | End: 2021-02-15

## 2021-02-10 ENCOUNTER — LAB ENCOUNTER (OUTPATIENT)
Dept: LAB | Facility: HOSPITAL | Age: 41
End: 2021-02-10
Attending: INTERNAL MEDICINE
Payer: COMMERCIAL

## 2021-02-10 DIAGNOSIS — C62.91 CANCER, TESTIS, SEMINOMA, RIGHT (HCC): ICD-10-CM

## 2021-02-11 LAB — SARS-COV-2 RNA RESP QL NAA+PROBE: NOT DETECTED

## 2021-02-12 ENCOUNTER — HOSPITAL ENCOUNTER (OUTPATIENT)
Dept: INTERVENTIONAL RADIOLOGY/VASCULAR | Facility: HOSPITAL | Age: 41
Discharge: HOME | End: 2021-02-12
Attending: INTERNAL MEDICINE | Admitting: INTERNAL MEDICINE
Payer: COMMERCIAL

## 2021-02-12 ENCOUNTER — OFFICE VISIT (OUTPATIENT)
Dept: HEMATOLOGY/ONCOLOGY | Facility: HOSPITAL | Age: 41
End: 2021-02-12
Attending: INTERNAL MEDICINE
Payer: COMMERCIAL

## 2021-02-12 ENCOUNTER — TELEPHONE (OUTPATIENT)
Dept: HEMATOLOGY/ONCOLOGY | Facility: HOSPITAL | Age: 41
End: 2021-02-12

## 2021-02-12 VITALS
DIASTOLIC BLOOD PRESSURE: 76 MMHG | RESPIRATION RATE: 25 BRPM | OXYGEN SATURATION: 97 % | SYSTOLIC BLOOD PRESSURE: 111 MMHG | HEART RATE: 96 BPM | TEMPERATURE: 98 F

## 2021-02-12 DIAGNOSIS — Z01.818 EXAMINATION PRIOR TO CHEMOTHERAPY: ICD-10-CM

## 2021-02-12 DIAGNOSIS — C62.91 CANCER, TESTIS, SEMINOMA, RIGHT (HCC): Primary | ICD-10-CM

## 2021-02-12 DIAGNOSIS — C62.91 CANCER, TESTIS, SEMINOMA, RIGHT (HCC): ICD-10-CM

## 2021-02-12 DIAGNOSIS — Z71.89 OTHER SPECIFIED COUNSELING: ICD-10-CM

## 2021-02-12 LAB
INR CARTRIDGE LOT #: NORMAL
INR: 1 (ref 0.8–1.3)

## 2021-02-12 PROCEDURE — 99153 MOD SED SAME PHYS/QHP EA: CPT

## 2021-02-12 PROCEDURE — 76937 US GUIDE VASCULAR ACCESS: CPT

## 2021-02-12 PROCEDURE — 77001 FLUOROGUIDE FOR VEIN DEVICE: CPT

## 2021-02-12 PROCEDURE — 85610 PROTHROMBIN TIME: CPT

## 2021-02-12 PROCEDURE — 99152 MOD SED SAME PHYS/QHP 5/>YRS: CPT

## 2021-02-12 PROCEDURE — 0JH63WZ INSERTION OF TOTALLY IMPLANTABLE VASCULAR ACCESS DEVICE INTO CHEST SUBCUTANEOUS TISSUE AND FASCIA, PERCUTANEOUS APPROACH: ICD-10-PCS | Performed by: RADIOLOGY

## 2021-02-12 PROCEDURE — 99417 PROLNG OP E/M EACH 15 MIN: CPT | Performed by: CLINICAL NURSE SPECIALIST

## 2021-02-12 PROCEDURE — 99215 OFFICE O/P EST HI 40 MIN: CPT | Performed by: CLINICAL NURSE SPECIALIST

## 2021-02-12 PROCEDURE — 02HV33Z INSERTION OF INFUSION DEVICE INTO SUPERIOR VENA CAVA, PERCUTANEOUS APPROACH: ICD-10-PCS | Performed by: RADIOLOGY

## 2021-02-12 PROCEDURE — 36561 INSERT TUNNELED CV CATH: CPT

## 2021-02-12 RX ORDER — LIDOCAINE HYDROCHLORIDE 10 MG/ML
INJECTION, SOLUTION INFILTRATION; PERINEURAL
Status: COMPLETED
Start: 2021-02-12 | End: 2021-02-12

## 2021-02-12 RX ORDER — SODIUM CHLORIDE 9 MG/ML
INJECTION, SOLUTION INTRAVENOUS CONTINUOUS
Status: DISCONTINUED | OUTPATIENT
Start: 2021-02-12 | End: 2021-02-12

## 2021-02-12 RX ORDER — HEPARIN SODIUM 5000 [USP'U]/ML
INJECTION, SOLUTION INTRAVENOUS; SUBCUTANEOUS
Status: COMPLETED
Start: 2021-02-12 | End: 2021-02-12

## 2021-02-12 RX ORDER — MIDAZOLAM HYDROCHLORIDE 1 MG/ML
INJECTION INTRAMUSCULAR; INTRAVENOUS
Status: COMPLETED
Start: 2021-02-12 | End: 2021-02-12

## 2021-02-12 RX ADMIN — SODIUM CHLORIDE: 9 INJECTION, SOLUTION INTRAVENOUS at 07:45:00

## 2021-02-12 NOTE — PROGRESS NOTES
IV Chemotherapy Education    Patient:Jesús Purcell Cost     Date: 2/12/21   Diagnosis:  Testicular cancer     Caregivers present: Father    Drug names:  Bleomycin, Etoposide, Cisplatin    Myelosuppression  Decrease in wbc  Decrease in hgb  Decrease in consent form on 2/15/21. Unable to sign consent form now due to sedation given for port placement. Pt will re-review information. Father present throughout education visit.     I spent 75 minutes  counseling patient regarding the above documented side ef

## 2021-02-12 NOTE — H&P
[address] Patient Status:  Outpatient in a Bed    1980 MRN [id number]   Location [address] [facility] Attending Gege Marie MD   Hosp Day # 0 PCP MD Fred Salgado [facility] bilat DP    Results:   Labs:  No results for input(s): RBC, HGB, HCT, MCV, MCH, MCHC, RDW, NEPRELIM, WBC, PLT in the last 168 hours.   Recent Labs   Lab 02/12/21  0822   INR 1.0     No results for input(s): GLU, BUN, CREATSERUM, GFRAA, GFRNAA, CA, NA, K, CL

## 2021-02-12 NOTE — PROCEDURES
[facility]  Procedure Note    Ann Marie Whitetiny Glez Patient Status:  Outpatient in a Bed    1980 MRN [id number]   Location [address] [facility] Attending Anais Gray MD   Hosp Day # 0 PCP Ann Marie Lyn MD     Procedure

## 2021-02-13 ENCOUNTER — RT VISIT (OUTPATIENT)
Dept: RESPIRATORY THERAPY | Facility: HOSPITAL | Age: 41
End: 2021-02-13
Attending: INTERNAL MEDICINE
Payer: COMMERCIAL

## 2021-02-13 DIAGNOSIS — Z01.818 EXAMINATION PRIOR TO CHEMOTHERAPY: ICD-10-CM

## 2021-02-13 DIAGNOSIS — C62.91 CANCER, TESTIS, SEMINOMA, RIGHT (HCC): ICD-10-CM

## 2021-02-13 PROCEDURE — 94729 DIFFUSING CAPACITY: CPT

## 2021-02-13 PROCEDURE — 94010 BREATHING CAPACITY TEST: CPT

## 2021-02-13 PROCEDURE — 94726 PLETHYSMOGRAPHY LUNG VOLUMES: CPT

## 2021-02-15 ENCOUNTER — OFFICE VISIT (OUTPATIENT)
Dept: HEMATOLOGY/ONCOLOGY | Facility: HOSPITAL | Age: 41
End: 2021-02-15
Attending: INTERNAL MEDICINE
Payer: COMMERCIAL

## 2021-02-15 VITALS
HEIGHT: 69.21 IN | SYSTOLIC BLOOD PRESSURE: 138 MMHG | OXYGEN SATURATION: 100 % | RESPIRATION RATE: 16 BRPM | WEIGHT: 222.81 LBS | DIASTOLIC BLOOD PRESSURE: 87 MMHG | HEART RATE: 115 BPM | BODY MASS INDEX: 32.63 KG/M2 | TEMPERATURE: 97 F

## 2021-02-15 DIAGNOSIS — C62.91 CANCER, TESTIS, SEMINOMA, RIGHT (HCC): Primary | ICD-10-CM

## 2021-02-15 DIAGNOSIS — Z51.11 ENCOUNTER FOR ANTINEOPLASTIC CHEMOTHERAPY: ICD-10-CM

## 2021-02-15 LAB
ALBUMIN SERPL-MCNC: 3.7 G/DL (ref 3.4–5)
ALBUMIN/GLOB SERPL: 1.2 {RATIO} (ref 1–2)
ALP LIVER SERPL-CCNC: 70 U/L
ALT SERPL-CCNC: 32 U/L
ANION GAP SERPL CALC-SCNC: 5 MMOL/L (ref 0–18)
AST SERPL-CCNC: 21 U/L (ref 15–37)
BASOPHILS # BLD AUTO: 0.05 X10(3) UL (ref 0–0.2)
BASOPHILS NFR BLD AUTO: 0.6 %
BILIRUB SERPL-MCNC: 0.3 MG/DL (ref 0.1–2)
BUN BLD-MCNC: 23 MG/DL (ref 7–18)
BUN/CREAT SERPL: 20.5 (ref 10–20)
CALCIUM BLD-MCNC: 8.8 MG/DL (ref 8.5–10.1)
CHLORIDE SERPL-SCNC: 107 MMOL/L (ref 98–112)
CO2 SERPL-SCNC: 28 MMOL/L (ref 21–32)
CREAT BLD-MCNC: 1.12 MG/DL
DEPRECATED RDW RBC AUTO: 41.9 FL (ref 35.1–46.3)
EOSINOPHIL # BLD AUTO: 0.18 X10(3) UL (ref 0–0.7)
EOSINOPHIL NFR BLD AUTO: 2.3 %
ERYTHROCYTE [DISTWIDTH] IN BLOOD BY AUTOMATED COUNT: 12.8 % (ref 11–15)
GLOBULIN PLAS-MCNC: 3.2 G/DL (ref 2.8–4.4)
GLUCOSE BLD-MCNC: 101 MG/DL (ref 70–99)
HCT VFR BLD AUTO: 40.7 %
HGB BLD-MCNC: 13.7 G/DL
IMM GRANULOCYTES # BLD AUTO: 0.03 X10(3) UL (ref 0–1)
IMM GRANULOCYTES NFR BLD: 0.4 %
LYMPHOCYTES # BLD AUTO: 2 X10(3) UL (ref 1–4)
LYMPHOCYTES NFR BLD AUTO: 25.6 %
M PROTEIN MFR SERPL ELPH: 6.9 G/DL (ref 6.4–8.2)
MCH RBC QN AUTO: 30.2 PG (ref 26–34)
MCHC RBC AUTO-ENTMCNC: 33.7 G/DL (ref 31–37)
MCV RBC AUTO: 89.8 FL
MONOCYTES # BLD AUTO: 0.61 X10(3) UL (ref 0.1–1)
MONOCYTES NFR BLD AUTO: 7.8 %
NEUTROPHILS # BLD AUTO: 4.94 X10 (3) UL (ref 1.5–7.7)
NEUTROPHILS # BLD AUTO: 4.94 X10(3) UL (ref 1.5–7.7)
NEUTROPHILS NFR BLD AUTO: 63.3 %
OSMOLALITY SERPL CALC.SUM OF ELEC: 294 MOSM/KG (ref 275–295)
PATIENT FASTING Y/N/NP: NO
PLATELET # BLD AUTO: 313 10(3)UL (ref 150–450)
POTASSIUM SERPL-SCNC: 3.6 MMOL/L (ref 3.5–5.1)
RBC # BLD AUTO: 4.53 X10(6)UL
SODIUM SERPL-SCNC: 140 MMOL/L (ref 136–145)
WBC # BLD AUTO: 7.8 X10(3) UL (ref 4–11)

## 2021-02-15 PROCEDURE — 80053 COMPREHEN METABOLIC PANEL: CPT

## 2021-02-15 PROCEDURE — 96417 CHEMO IV INFUS EACH ADDL SEQ: CPT

## 2021-02-15 PROCEDURE — 96366 THER/PROPH/DIAG IV INF ADDON: CPT

## 2021-02-15 PROCEDURE — 96411 CHEMO IV PUSH ADDL DRUG: CPT

## 2021-02-15 PROCEDURE — 99215 OFFICE O/P EST HI 40 MIN: CPT | Performed by: INTERNAL MEDICINE

## 2021-02-15 PROCEDURE — 85025 COMPLETE CBC W/AUTO DIFF WBC: CPT

## 2021-02-15 PROCEDURE — 96401 CHEMO ANTI-NEOPL SQ/IM: CPT

## 2021-02-15 PROCEDURE — 96367 TX/PROPH/DG ADDL SEQ IV INF: CPT

## 2021-02-15 PROCEDURE — 96375 TX/PRO/DX INJ NEW DRUG ADDON: CPT

## 2021-02-15 PROCEDURE — 96413 CHEMO IV INFUSION 1 HR: CPT

## 2021-02-15 RX ORDER — SODIUM CHLORIDE 0.9 % (FLUSH) 0.9 %
10 SYRINGE (ML) INJECTION ONCE
Status: COMPLETED | OUTPATIENT
Start: 2021-02-15 | End: 2021-02-15

## 2021-02-15 RX ORDER — SODIUM CHLORIDE 0.9 % (FLUSH) 0.9 %
10 SYRINGE (ML) INJECTION ONCE
Status: CANCELLED | OUTPATIENT
Start: 2021-02-15

## 2021-02-15 RX ADMIN — SODIUM CHLORIDE 0.9 % (FLUSH) 10 ML: 0.9 % SYRINGE (ML) INJECTION at 17:15:00

## 2021-02-15 NOTE — PROGRESS NOTES
Outpatient Oncology Care Plan  Problem list:  knowledge deficit    Problems related to:    chemotherapy    Interventions:  provided general teaching    Expected outcomes:  understands plan of care    Progress towards outcome:  making progress    Education

## 2021-02-15 NOTE — PATIENT INSTRUCTIONS
Anti-Nausea:    1. You may take ondansetron (zofran) tonight at 830pm or prior to bedtime. 2. If needed you may take prochlorperazine (compazine) today prior to meal time (aroun 5-6pm)  3.  If nauseous tomorrow morning prior to arrival please take a compaz

## 2021-02-15 NOTE — PROGRESS NOTES
Hematology/Oncology Clinic Follow Up Visit    Patient Name: Molly Marinelli  Medical Record Number: [id number]    YOB: 1980    PCP: Dr. Jayro Muller  Other providers: Dr. Kaden Pittman (urology)    Reason for Consultation:  Alejandra Willis denies any fevers or infections. No bowel or bladder changes. No increased dyspnea or cough.       Past Medical History:  Past Medical History:   Diagnosis Date   • Acute pharyngitis    • Allergic rhinitis    • Bronchitis    • Cancer, testis, seminoma, ri Comment:Swollen uvula    Psychosocial History:  Social History    Social History Narrative      Single, lives alone. Works as an . No children. Has a brother who lives in [place]. His parents live in [place].     Social History 02/15/2021    HGB 15.0 02/04/2021    HGB 14.7 01/13/2021    HCT 40.7 02/15/2021    MCV 89.8 02/15/2021    MCH 30.2 02/15/2021    MCHC 33.7 02/15/2021    RDW 12.8 02/15/2021    .0 02/15/2021    .0 02/04/2021    .0 01/13/2021     Lab Res formal chemotherapy education visit.  -counseled on risk for infertility. He could not get into the fertility clinic for sperm banking last week.   I advised today that it would be safe to delay his chemo at this time to accommodate him to do sperm banking

## 2021-02-15 NOTE — PROGRESS NOTES
Patient chose to leave his port accessed for tomorrow's treatment. Line flushed with saline and heparin. Dressing reinforced.

## 2021-02-16 ENCOUNTER — OFFICE VISIT (OUTPATIENT)
Dept: HEMATOLOGY/ONCOLOGY | Facility: HOSPITAL | Age: 41
End: 2021-02-16
Attending: INTERNAL MEDICINE
Payer: COMMERCIAL

## 2021-02-16 VITALS
WEIGHT: 221.63 LBS | BODY MASS INDEX: 32.45 KG/M2 | DIASTOLIC BLOOD PRESSURE: 78 MMHG | HEIGHT: 69.21 IN | SYSTOLIC BLOOD PRESSURE: 129 MMHG | TEMPERATURE: 98 F | HEART RATE: 112 BPM | OXYGEN SATURATION: 99 % | RESPIRATION RATE: 20 BRPM

## 2021-02-16 DIAGNOSIS — C62.91 CANCER, TESTIS, SEMINOMA, RIGHT (HCC): Primary | ICD-10-CM

## 2021-02-16 DIAGNOSIS — R06.00 DOE (DYSPNEA ON EXERTION): ICD-10-CM

## 2021-02-16 PROBLEM — Z51.11 ENCOUNTER FOR ANTINEOPLASTIC CHEMOTHERAPY: Status: ACTIVE | Noted: 2021-02-16

## 2021-02-16 PROCEDURE — 96366 THER/PROPH/DIAG IV INF ADDON: CPT

## 2021-02-16 PROCEDURE — 99215 OFFICE O/P EST HI 40 MIN: CPT | Performed by: CLINICAL NURSE SPECIALIST

## 2021-02-16 PROCEDURE — 96367 TX/PROPH/DG ADDL SEQ IV INF: CPT

## 2021-02-16 PROCEDURE — 96417 CHEMO IV INFUS EACH ADDL SEQ: CPT

## 2021-02-16 PROCEDURE — 96375 TX/PRO/DX INJ NEW DRUG ADDON: CPT

## 2021-02-16 PROCEDURE — 96413 CHEMO IV INFUSION 1 HR: CPT

## 2021-02-16 NOTE — PROGRESS NOTES
Cancer Center Progress Note    Patient Name: Chiara Ly   YOB: 1980   Medical Record Number: [id number]   CSN: [id number]   Date of visit: 2/16/2021   Provider: SHAUN Sykes  Referring Physician: Kathryn Stevens    Pro every 6 (six) hours as needed for Nausea., Disp: 30 tablet, Rfl: 3  •  Ondansetron HCl (ZOFRAN) 8 MG tablet, Take 1 tablet (8 mg total) by mouth every 8 (eight) hours as needed for Nausea., Disp: 30 tablet, Rfl: 3  •  EPINEPHrine 0.3 MG/0.3ML Injection Sol

## 2021-02-16 NOTE — PROGRESS NOTES
Pt here for C1D2.   Arrives Ambulating independently, accompanied by Self           Patient reports possible pregnancy since last therapy cycle: No    Modifications in dose or schedule: No     Frequency of blood return and site check throughout administrati

## 2021-02-17 ENCOUNTER — OFFICE VISIT (OUTPATIENT)
Dept: HEMATOLOGY/ONCOLOGY | Facility: HOSPITAL | Age: 41
End: 2021-02-17
Attending: INTERNAL MEDICINE
Payer: COMMERCIAL

## 2021-02-17 VITALS
HEIGHT: 69.21 IN | BODY MASS INDEX: 32.97 KG/M2 | DIASTOLIC BLOOD PRESSURE: 62 MMHG | OXYGEN SATURATION: 99 % | RESPIRATION RATE: 20 BRPM | TEMPERATURE: 98 F | SYSTOLIC BLOOD PRESSURE: 121 MMHG | HEART RATE: 114 BPM | WEIGHT: 225.19 LBS

## 2021-02-17 DIAGNOSIS — C62.91 CANCER, TESTIS, SEMINOMA, RIGHT (HCC): Primary | ICD-10-CM

## 2021-02-17 PROCEDURE — 96417 CHEMO IV INFUS EACH ADDL SEQ: CPT

## 2021-02-17 PROCEDURE — 96375 TX/PRO/DX INJ NEW DRUG ADDON: CPT

## 2021-02-17 PROCEDURE — 96366 THER/PROPH/DIAG IV INF ADDON: CPT

## 2021-02-17 PROCEDURE — 96367 TX/PROPH/DG ADDL SEQ IV INF: CPT

## 2021-02-17 PROCEDURE — 96413 CHEMO IV INFUSION 1 HR: CPT

## 2021-02-17 NOTE — PROCEDURES
Findings:  FEV1 is 4.31L, 102% predicted. FVC is 4.74L, 90% predicted. FEV1/ FVC ratio is 0.91. The flow-volume loop demonstrates a normal pattern. The TLC is 6.29L, 88% predicted. The residual volume 1.55L, 79% predicted.   The diffusion capacity is 8

## 2021-02-17 NOTE — PROGRESS NOTES
Pt here for C1D3 of Cisplat/eteoposide/Bleomycin.   Arrives Ambulating independently, accompanied by Self           Patient reports possible pregnancy since last therapy cycle: No    Modifications in dose or schedule: No     Frequency of blood return and si

## 2021-02-18 ENCOUNTER — OFFICE VISIT (OUTPATIENT)
Dept: HEMATOLOGY/ONCOLOGY | Facility: HOSPITAL | Age: 41
End: 2021-02-18
Attending: INTERNAL MEDICINE
Payer: COMMERCIAL

## 2021-02-18 VITALS
BODY MASS INDEX: 32.83 KG/M2 | DIASTOLIC BLOOD PRESSURE: 68 MMHG | SYSTOLIC BLOOD PRESSURE: 112 MMHG | TEMPERATURE: 98 F | HEIGHT: 69.21 IN | RESPIRATION RATE: 18 BRPM | WEIGHT: 224.19 LBS | HEART RATE: 107 BPM

## 2021-02-18 DIAGNOSIS — C62.91 CANCER, TESTIS, SEMINOMA, RIGHT (HCC): Primary | ICD-10-CM

## 2021-02-18 PROCEDURE — 96367 TX/PROPH/DG ADDL SEQ IV INF: CPT

## 2021-02-18 PROCEDURE — 96413 CHEMO IV INFUSION 1 HR: CPT

## 2021-02-18 PROCEDURE — 96366 THER/PROPH/DIAG IV INF ADDON: CPT

## 2021-02-18 PROCEDURE — 96417 CHEMO IV INFUS EACH ADDL SEQ: CPT

## 2021-02-18 PROCEDURE — 96375 TX/PRO/DX INJ NEW DRUG ADDON: CPT

## 2021-02-18 RX ORDER — SODIUM CHLORIDE 0.9 % (FLUSH) 0.9 %
10 SYRINGE (ML) INJECTION ONCE
Status: COMPLETED | OUTPATIENT
Start: 2021-02-18 | End: 2021-02-18

## 2021-02-18 RX ORDER — CALCIUM CARBONATE 200(500)MG
1 TABLET,CHEWABLE ORAL AS NEEDED
COMMUNITY

## 2021-02-18 RX ORDER — SODIUM CHLORIDE 0.9 % (FLUSH) 0.9 %
10 SYRINGE (ML) INJECTION ONCE
Status: CANCELLED | OUTPATIENT
Start: 2021-02-18

## 2021-02-18 RX ADMIN — SODIUM CHLORIDE 0.9 % (FLUSH) 10 ML: 0.9 % SYRINGE (ML) INJECTION at 16:30:00

## 2021-02-18 NOTE — PROGRESS NOTES
Pt here for C1D4 Cisplatin/Etoposide.   Arrives Ambulating independently, accompanied by Self           Patient reports possible pregnancy since last therapy cycle: Not Applicable    Modifications in dose or schedule: no     Frequency of blood return and si

## 2021-02-19 ENCOUNTER — OFFICE VISIT (OUTPATIENT)
Dept: HEMATOLOGY/ONCOLOGY | Facility: HOSPITAL | Age: 41
End: 2021-02-19
Attending: INTERNAL MEDICINE
Payer: COMMERCIAL

## 2021-02-19 VITALS
TEMPERATURE: 97 F | DIASTOLIC BLOOD PRESSURE: 72 MMHG | WEIGHT: 224.19 LBS | SYSTOLIC BLOOD PRESSURE: 118 MMHG | HEIGHT: 69.21 IN | RESPIRATION RATE: 20 BRPM | HEART RATE: 110 BPM | OXYGEN SATURATION: 99 % | BODY MASS INDEX: 32.83 KG/M2

## 2021-02-19 DIAGNOSIS — C62.91 CANCER, TESTIS, SEMINOMA, RIGHT (HCC): Primary | ICD-10-CM

## 2021-02-19 PROCEDURE — 96367 TX/PROPH/DG ADDL SEQ IV INF: CPT

## 2021-02-19 PROCEDURE — 96366 THER/PROPH/DIAG IV INF ADDON: CPT

## 2021-02-19 PROCEDURE — 96417 CHEMO IV INFUS EACH ADDL SEQ: CPT

## 2021-02-19 PROCEDURE — 96375 TX/PRO/DX INJ NEW DRUG ADDON: CPT

## 2021-02-19 PROCEDURE — 96413 CHEMO IV INFUSION 1 HR: CPT

## 2021-02-19 RX ORDER — SODIUM CHLORIDE 0.9 % (FLUSH) 0.9 %
10 SYRINGE (ML) INJECTION ONCE
Status: CANCELLED | OUTPATIENT
Start: 2021-02-19

## 2021-02-19 RX ORDER — SODIUM CHLORIDE 0.9 % (FLUSH) 0.9 %
10 SYRINGE (ML) INJECTION ONCE
Status: COMPLETED | OUTPATIENT
Start: 2021-02-19 | End: 2021-02-19

## 2021-02-19 RX ADMIN — SODIUM CHLORIDE 0.9 % (FLUSH) 10 ML: 0.9 % SYRINGE (ML) INJECTION at 14:48:00

## 2021-02-19 NOTE — PATIENT INSTRUCTIONS
[place] SCHEDULE:    1. You may take ondasetron (zofran) tonight at 6pm.  2. You may take prochlorperazine (compazine) tonight at 10pm.  3. Alternate between zofran and compazinie every 4 hours for the next 2-3 days.    For example: zofran at [facility]

## 2021-02-19 NOTE — PROGRESS NOTES
Pt here for C1D5.   Arrives Ambulating independently, accompanied by Self           Patient reports possible pregnancy since last therapy cycle: Not Applicable    Modifications in dose or schedule: No     Frequency of blood return and site check throughout

## 2021-02-20 ENCOUNTER — HOSPITAL ENCOUNTER (EMERGENCY)
Facility: HOSPITAL | Age: 41
Discharge: HOME | End: 2021-02-20
Attending: EMERGENCY MEDICINE
Payer: COMMERCIAL

## 2021-02-20 ENCOUNTER — TELEPHONE (OUTPATIENT)
Dept: HEMATOLOGY/ONCOLOGY | Facility: HOSPITAL | Age: 41
End: 2021-02-20

## 2021-02-20 ENCOUNTER — OFFICE VISIT (OUTPATIENT)
Dept: HEMATOLOGY/ONCOLOGY | Facility: HOSPITAL | Age: 41
End: 2021-02-20
Attending: INTERNAL MEDICINE
Payer: COMMERCIAL

## 2021-02-20 VITALS
SYSTOLIC BLOOD PRESSURE: 115 MMHG | TEMPERATURE: 99 F | HEIGHT: 71 IN | BODY MASS INDEX: 31.36 KG/M2 | DIASTOLIC BLOOD PRESSURE: 82 MMHG | RESPIRATION RATE: 18 BRPM | WEIGHT: 224 LBS | OXYGEN SATURATION: 100 % | HEART RATE: 73 BPM

## 2021-02-20 DIAGNOSIS — C62.91 CANCER, TESTIS, SEMINOMA, RIGHT (HCC): ICD-10-CM

## 2021-02-20 DIAGNOSIS — R11.0 CHEMOTHERAPY-INDUCED NAUSEA: Primary | ICD-10-CM

## 2021-02-20 DIAGNOSIS — K21.9 GASTROESOPHAGEAL REFLUX DISEASE, UNSPECIFIED WHETHER ESOPHAGITIS PRESENT: ICD-10-CM

## 2021-02-20 DIAGNOSIS — E86.0 DEHYDRATION: ICD-10-CM

## 2021-02-20 DIAGNOSIS — C62.91 CANCER, TESTIS, SEMINOMA, RIGHT (HCC): Primary | ICD-10-CM

## 2021-02-20 DIAGNOSIS — T45.1X5A CHEMOTHERAPY-INDUCED NAUSEA: Primary | ICD-10-CM

## 2021-02-20 LAB
ALBUMIN SERPL-MCNC: 3.7 G/DL (ref 3.4–5)
ALBUMIN/GLOB SERPL: 1.2 {RATIO} (ref 1–2)
ALP LIVER SERPL-CCNC: 49 U/L
ALT SERPL-CCNC: 50 U/L
ANION GAP SERPL CALC-SCNC: 6 MMOL/L (ref 0–18)
AST SERPL-CCNC: 22 U/L (ref 15–37)
BASOPHILS # BLD AUTO: 0.01 X10(3) UL (ref 0–0.2)
BASOPHILS NFR BLD AUTO: 0.1 %
BILIRUB SERPL-MCNC: 0.9 MG/DL (ref 0.1–2)
BILIRUB UR QL STRIP.AUTO: NEGATIVE
BUN BLD-MCNC: 22 MG/DL (ref 7–18)
BUN/CREAT SERPL: 20.4 (ref 10–20)
CALCIUM BLD-MCNC: 8.5 MG/DL (ref 8.5–10.1)
CHLORIDE SERPL-SCNC: 103 MMOL/L (ref 98–112)
CLARITY UR REFRACT.AUTO: CLEAR
CO2 SERPL-SCNC: 26 MMOL/L (ref 21–32)
COLOR UR AUTO: YELLOW
CREAT BLD-MCNC: 1.08 MG/DL
DEPRECATED RDW RBC AUTO: 38.7 FL (ref 35.1–46.3)
EOSINOPHIL # BLD AUTO: 0.1 X10(3) UL (ref 0–0.7)
EOSINOPHIL NFR BLD AUTO: 1 %
ERYTHROCYTE [DISTWIDTH] IN BLOOD BY AUTOMATED COUNT: 12.4 % (ref 11–15)
GLOBULIN PLAS-MCNC: 3.1 G/DL (ref 2.8–4.4)
GLUCOSE BLD-MCNC: 80 MG/DL (ref 70–99)
GLUCOSE UR STRIP.AUTO-MCNC: NEGATIVE MG/DL
HCT VFR BLD AUTO: 37.1 %
HGB BLD-MCNC: 13.2 G/DL
IMM GRANULOCYTES # BLD AUTO: 0.04 X10(3) UL (ref 0–1)
IMM GRANULOCYTES NFR BLD: 0.4 %
KETONES UR STRIP.AUTO-MCNC: NEGATIVE MG/DL
LEUKOCYTE ESTERASE UR QL STRIP.AUTO: NEGATIVE
LIPASE SERPL-CCNC: 108 U/L (ref 73–393)
LYMPHOCYTES # BLD AUTO: 2.38 X10(3) UL (ref 1–4)
LYMPHOCYTES NFR BLD AUTO: 24.7 %
M PROTEIN MFR SERPL ELPH: 6.8 G/DL (ref 6.4–8.2)
MCH RBC QN AUTO: 30.6 PG (ref 26–34)
MCHC RBC AUTO-ENTMCNC: 35.6 G/DL (ref 31–37)
MCV RBC AUTO: 85.9 FL
MONOCYTES # BLD AUTO: 0.03 X10(3) UL (ref 0.1–1)
MONOCYTES NFR BLD AUTO: 0.3 %
NEUTROPHILS # BLD AUTO: 7.06 X10 (3) UL (ref 1.5–7.7)
NEUTROPHILS # BLD AUTO: 7.06 X10(3) UL (ref 1.5–7.7)
NEUTROPHILS NFR BLD AUTO: 73.5 %
NITRITE UR QL STRIP.AUTO: NEGATIVE
OSMOLALITY SERPL CALC.SUM OF ELEC: 282 MOSM/KG (ref 275–295)
PH UR STRIP.AUTO: 7 [PH] (ref 4.5–8)
PLATELET # BLD AUTO: 304 10(3)UL (ref 150–450)
POTASSIUM SERPL-SCNC: 3.7 MMOL/L (ref 3.5–5.1)
PROT UR STRIP.AUTO-MCNC: NEGATIVE MG/DL
RBC # BLD AUTO: 4.32 X10(6)UL
SODIUM SERPL-SCNC: 135 MMOL/L (ref 136–145)
SP GR UR STRIP.AUTO: 1.01 (ref 1–1.03)
UROBILINOGEN UR STRIP.AUTO-MCNC: <2 MG/DL
WBC # BLD AUTO: 9.6 X10(3) UL (ref 4–11)

## 2021-02-20 PROCEDURE — 99284 EMERGENCY DEPT VISIT MOD MDM: CPT

## 2021-02-20 PROCEDURE — 83690 ASSAY OF LIPASE: CPT | Performed by: EMERGENCY MEDICINE

## 2021-02-20 PROCEDURE — 99285 EMERGENCY DEPT VISIT HI MDM: CPT

## 2021-02-20 PROCEDURE — 96374 THER/PROPH/DIAG INJ IV PUSH: CPT

## 2021-02-20 PROCEDURE — 81001 URINALYSIS AUTO W/SCOPE: CPT | Performed by: EMERGENCY MEDICINE

## 2021-02-20 PROCEDURE — 96361 HYDRATE IV INFUSION ADD-ON: CPT

## 2021-02-20 PROCEDURE — 96376 TX/PRO/DX INJ SAME DRUG ADON: CPT

## 2021-02-20 PROCEDURE — C9113 INJ PANTOPRAZOLE SODIUM, VIA: HCPCS | Performed by: EMERGENCY MEDICINE

## 2021-02-20 PROCEDURE — 80053 COMPREHEN METABOLIC PANEL: CPT | Performed by: EMERGENCY MEDICINE

## 2021-02-20 PROCEDURE — 96375 TX/PRO/DX INJ NEW DRUG ADDON: CPT

## 2021-02-20 PROCEDURE — 85025 COMPLETE CBC W/AUTO DIFF WBC: CPT | Performed by: EMERGENCY MEDICINE

## 2021-02-20 RX ORDER — METOCLOPRAMIDE HYDROCHLORIDE 5 MG/ML
10 INJECTION INTRAMUSCULAR; INTRAVENOUS ONCE
Status: DISCONTINUED | OUTPATIENT
Start: 2021-02-20 | End: 2021-02-20

## 2021-02-20 RX ORDER — ONDANSETRON 2 MG/ML
4 INJECTION INTRAMUSCULAR; INTRAVENOUS
Status: COMPLETED | OUTPATIENT
Start: 2021-02-20 | End: 2021-02-20

## 2021-02-20 RX ORDER — MAGNESIUM HYDROXIDE/ALUMINUM HYDROXICE/SIMETHICONE 120; 1200; 1200 MG/30ML; MG/30ML; MG/30ML
30 SUSPENSION ORAL ONCE
Status: COMPLETED | OUTPATIENT
Start: 2021-02-20 | End: 2021-02-20

## 2021-02-20 RX ORDER — PANTOPRAZOLE SODIUM 40 MG/1
40 TABLET, DELAYED RELEASE ORAL
Qty: 30 TABLET | Refills: 0 | Status: SHIPPED | OUTPATIENT
Start: 2021-02-20 | End: 2021-05-10

## 2021-02-20 RX ORDER — LORAZEPAM 0.5 MG/1
TABLET ORAL EVERY 6 HOURS PRN
Qty: 40 TABLET | Refills: 0 | Status: SHIPPED | OUTPATIENT
Start: 2021-02-20 | End: 2021-05-10

## 2021-02-20 RX ORDER — LIDOCAINE HYDROCHLORIDE 20 MG/ML
10 SOLUTION OROPHARYNGEAL ONCE
Status: COMPLETED | OUTPATIENT
Start: 2021-02-20 | End: 2021-02-20

## 2021-02-20 RX ORDER — SODIUM CHLORIDE 9 MG/ML
INJECTION, SOLUTION INTRAVENOUS CONTINUOUS
Status: DISCONTINUED | OUTPATIENT
Start: 2021-02-20 | End: 2021-02-20

## 2021-02-20 RX ORDER — DIPHENHYDRAMINE HYDROCHLORIDE 50 MG/ML
25 INJECTION INTRAMUSCULAR; INTRAVENOUS ONCE
Status: DISCONTINUED | OUTPATIENT
Start: 2021-02-20 | End: 2021-02-20

## 2021-02-20 NOTE — ED NOTES
MD notified, pt has been hyperventilating in room due to his discomfort with his nausea. I had informed MD that I had instructed pt to slow down his breathing. Pt now reports feeling tingling and weak and his fingers are cold.  Pulse oximeter was on finger

## 2021-02-20 NOTE — ED NOTES
Pt reports that he called the cancer center hotline and was instructed to come to the ER if his nausea did not improve.  Pt reports he last took 8mg PO zofran at 0230am and that he took compazine at 10pm.

## 2021-02-20 NOTE — TELEPHONE ENCOUNTER
Patient was in ER since 4 this morning due to intense nausea/reflux. Was recently discharged from ER. Called patient to see if he would prefer to rest today and come in tomorrow for ziextenzo injection. Patient said he would prefer that.  Let Johna Goldberg RN kno

## 2021-02-20 NOTE — ED INITIAL ASSESSMENT (HPI)
Patient here with c/o nausea and acid reflux. Patient reports that he is on chemo for testicular cancer. Patient took compazine at 330AM and zofran without relief.   Denies diarrhea and fever

## 2021-02-20 NOTE — ED PROVIDER NOTES
Patient Seen in: [facility] Emergency Department      History   Patient presents with:  Nausea/Vomiting/Diarrhea    Stated Complaint: pt c/o of NV     HPI/Subjective:   HPI    Patient has history of seminoma of the right testicle.   He first noticed status: Never Smoker      Smokeless tobacco: Never Used    Alcohol use: Not Currently      Comment: occ, 1-2 per week, no hx excessive use     Drug use:  No             Review of Systems    Positive for stated complaint: pt c/o of NV   Other systems are as DIFFERENTIAL - Abnormal; Notable for the following components:    HCT 37.1 (*)     Monocyte Absolute 0.03 (*)     All other components within normal limits   LIPASE - Normal   CBC WITH DIFFERENTIAL WITH PLATELET    Narrative:      The following orders were diagnosis)  Dehydration  Gastroesophageal reflux disease, unspecified whether esophagitis present  Cancer, testis, seminoma, right [facility])    Disposition:  Discharge  2/20/2021  8:47 am    Follow-up:  Humphrey Choi MD  [address] [place] [address]

## 2021-02-21 ENCOUNTER — OFFICE VISIT (OUTPATIENT)
Dept: HEMATOLOGY/ONCOLOGY | Facility: HOSPITAL | Age: 41
End: 2021-02-21
Attending: INTERNAL MEDICINE
Payer: COMMERCIAL

## 2021-02-21 VITALS
HEART RATE: 102 BPM | TEMPERATURE: 98 F | OXYGEN SATURATION: 100 % | RESPIRATION RATE: 18 BRPM | DIASTOLIC BLOOD PRESSURE: 80 MMHG | SYSTOLIC BLOOD PRESSURE: 116 MMHG

## 2021-02-21 DIAGNOSIS — C62.91 CANCER, TESTIS, SEMINOMA, RIGHT (HCC): Primary | ICD-10-CM

## 2021-02-21 PROCEDURE — 96372 THER/PROPH/DIAG INJ SC/IM: CPT

## 2021-02-21 NOTE — PROGRESS NOTES
Pt here for C1D6.   Arrives Ambulating independently, accompanied by Self           Patient reports possible pregnancy since last therapy cycle: Not Applicable    Modifications in dose or schedule: No     Frequency of blood return and site check throughout

## 2021-02-22 ENCOUNTER — OFFICE VISIT (OUTPATIENT)
Dept: HEMATOLOGY/ONCOLOGY | Facility: HOSPITAL | Age: 41
End: 2021-02-22
Attending: INTERNAL MEDICINE
Payer: COMMERCIAL

## 2021-02-22 ENCOUNTER — TELEPHONE (OUTPATIENT)
Dept: HEMATOLOGY/ONCOLOGY | Facility: HOSPITAL | Age: 41
End: 2021-02-22

## 2021-02-22 ENCOUNTER — HOSPITAL ENCOUNTER (EMERGENCY)
Facility: HOSPITAL | Age: 41
Discharge: HOME | End: 2021-02-22
Attending: EMERGENCY MEDICINE
Payer: COMMERCIAL

## 2021-02-22 VITALS
SYSTOLIC BLOOD PRESSURE: 120 MMHG | OXYGEN SATURATION: 98 % | WEIGHT: 216.63 LBS | HEART RATE: 95 BPM | TEMPERATURE: 98 F | RESPIRATION RATE: 18 BRPM | DIASTOLIC BLOOD PRESSURE: 73 MMHG | BODY MASS INDEX: 30 KG/M2

## 2021-02-22 VITALS
SYSTOLIC BLOOD PRESSURE: 103 MMHG | OXYGEN SATURATION: 100 % | BODY MASS INDEX: 29.96 KG/M2 | DIASTOLIC BLOOD PRESSURE: 66 MMHG | TEMPERATURE: 98 F | HEIGHT: 71 IN | HEART RATE: 122 BPM | WEIGHT: 214 LBS | RESPIRATION RATE: 24 BRPM

## 2021-02-22 DIAGNOSIS — C62.91 CANCER, TESTIS, SEMINOMA, RIGHT (HCC): Primary | ICD-10-CM

## 2021-02-22 DIAGNOSIS — C62.91 CANCER, TESTIS, SEMINOMA, RIGHT (HCC): ICD-10-CM

## 2021-02-22 DIAGNOSIS — T78.40XA ALLERGIC REACTION, INITIAL ENCOUNTER: ICD-10-CM

## 2021-02-22 DIAGNOSIS — T45.1X5A CHEMOTHERAPY INDUCED DIARRHEA: ICD-10-CM

## 2021-02-22 DIAGNOSIS — Z51.11 ENCOUNTER FOR ANTINEOPLASTIC CHEMOTHERAPY: ICD-10-CM

## 2021-02-22 DIAGNOSIS — K12.2 UVULITIS: Primary | ICD-10-CM

## 2021-02-22 DIAGNOSIS — K21.9 GASTROESOPHAGEAL REFLUX DISEASE, UNSPECIFIED WHETHER ESOPHAGITIS PRESENT: ICD-10-CM

## 2021-02-22 DIAGNOSIS — R11.2 CINV (CHEMOTHERAPY-INDUCED NAUSEA AND VOMITING): ICD-10-CM

## 2021-02-22 DIAGNOSIS — K52.1 CHEMOTHERAPY INDUCED DIARRHEA: ICD-10-CM

## 2021-02-22 DIAGNOSIS — T45.1X5A CINV (CHEMOTHERAPY-INDUCED NAUSEA AND VOMITING): ICD-10-CM

## 2021-02-22 LAB
ALBUMIN SERPL-MCNC: 3.7 G/DL (ref 3.4–5)
ALBUMIN/GLOB SERPL: 1.2 {RATIO} (ref 1–2)
ALP LIVER SERPL-CCNC: 69 U/L
ALT SERPL-CCNC: 54 U/L
ANION GAP SERPL CALC-SCNC: 7 MMOL/L (ref 0–18)
AST SERPL-CCNC: 11 U/L (ref 15–37)
BASOPHILS # BLD: 0 X10(3) UL (ref 0–0.2)
BASOPHILS NFR BLD: 0 %
BILIRUB SERPL-MCNC: 0.6 MG/DL (ref 0.1–2)
BUN BLD-MCNC: 16 MG/DL (ref 7–18)
BUN/CREAT SERPL: 15.8 (ref 10–20)
CALCIUM BLD-MCNC: 9 MG/DL (ref 8.5–10.1)
CHLORIDE SERPL-SCNC: 100 MMOL/L (ref 98–112)
CO2 SERPL-SCNC: 28 MMOL/L (ref 21–32)
CREAT BLD-MCNC: 1.01 MG/DL
DEPRECATED RDW RBC AUTO: 39.8 FL (ref 35.1–46.3)
EOSINOPHIL # BLD: 0.16 X10(3) UL (ref 0–0.7)
EOSINOPHIL NFR BLD: 1 %
ERYTHROCYTE [DISTWIDTH] IN BLOOD BY AUTOMATED COUNT: 12.2 % (ref 11–15)
GLOBULIN PLAS-MCNC: 3.2 G/DL (ref 2.8–4.4)
GLUCOSE BLD-MCNC: 86 MG/DL (ref 70–99)
HCT VFR BLD AUTO: 40 %
HGB BLD-MCNC: 13.4 G/DL
LYMPHOCYTES NFR BLD: 1.77 X10(3) UL (ref 1–4)
LYMPHOCYTES NFR BLD: 11 %
M PROTEIN MFR SERPL ELPH: 6.9 G/DL (ref 6.4–8.2)
MCH RBC QN AUTO: 30 PG (ref 26–34)
MCHC RBC AUTO-ENTMCNC: 33.5 G/DL (ref 31–37)
MCV RBC AUTO: 89.7 FL
MONOCYTES # BLD: 0 X10(3) UL (ref 0.1–1)
MONOCYTES NFR BLD: 0 %
MORPHOLOGY: NORMAL
NEUTROPHILS # BLD AUTO: 14 X10 (3) UL (ref 1.5–7.7)
NEUTROPHILS NFR BLD: 82 %
NEUTS BAND NFR BLD: 6 %
NEUTS HYPERSEG # BLD: 14.17 X10(3) UL (ref 1.5–7.7)
OSMOLALITY SERPL CALC.SUM OF ELEC: 280 MOSM/KG (ref 275–295)
PATIENT FASTING Y/N/NP: NO
PLATELET # BLD AUTO: 210 10(3)UL (ref 150–450)
PLATELET MORPHOLOGY: NORMAL
POTASSIUM SERPL-SCNC: 4 MMOL/L (ref 3.5–5.1)
RBC # BLD AUTO: 4.46 X10(6)UL
SODIUM SERPL-SCNC: 135 MMOL/L (ref 136–145)
TOTAL CELLS COUNTED: 100
WBC # BLD AUTO: 16.1 X10(3) UL (ref 4–11)

## 2021-02-22 PROCEDURE — 99285 EMERGENCY DEPT VISIT HI MDM: CPT

## 2021-02-22 PROCEDURE — 85007 BL SMEAR W/DIFF WBC COUNT: CPT

## 2021-02-22 PROCEDURE — 99284 EMERGENCY DEPT VISIT MOD MDM: CPT

## 2021-02-22 PROCEDURE — 96375 TX/PRO/DX INJ NEW DRUG ADDON: CPT

## 2021-02-22 PROCEDURE — 99215 OFFICE O/P EST HI 40 MIN: CPT | Performed by: INTERNAL MEDICINE

## 2021-02-22 PROCEDURE — 85025 COMPLETE CBC W/AUTO DIFF WBC: CPT

## 2021-02-22 PROCEDURE — 80053 COMPREHEN METABOLIC PANEL: CPT

## 2021-02-22 PROCEDURE — 96374 THER/PROPH/DIAG INJ IV PUSH: CPT

## 2021-02-22 PROCEDURE — S0028 INJECTION, FAMOTIDINE, 20 MG: HCPCS | Performed by: EMERGENCY MEDICINE

## 2021-02-22 PROCEDURE — 96409 CHEMO IV PUSH SNGL DRUG: CPT

## 2021-02-22 PROCEDURE — 85027 COMPLETE CBC AUTOMATED: CPT

## 2021-02-22 RX ORDER — PANTOPRAZOLE SODIUM 40 MG/1
40 TABLET, DELAYED RELEASE ORAL DAILY
Qty: 30 TABLET | Refills: 5 | Status: SHIPPED | OUTPATIENT
Start: 2021-02-22 | End: 2021-03-05

## 2021-02-22 RX ORDER — SODIUM CHLORIDE 0.9 % (FLUSH) 0.9 %
10 SYRINGE (ML) INJECTION ONCE
Status: CANCELLED | OUTPATIENT
Start: 2021-02-22

## 2021-02-22 RX ORDER — OLANZAPINE 2.5 MG/1
2.5 TABLET ORAL NIGHTLY
Qty: 30 TABLET | Refills: 2 | Status: SHIPPED | OUTPATIENT
Start: 2021-02-22 | End: 2021-05-20

## 2021-02-22 RX ORDER — FAMOTIDINE 10 MG/ML
20 INJECTION, SOLUTION INTRAVENOUS ONCE
Status: COMPLETED | OUTPATIENT
Start: 2021-02-22 | End: 2021-02-22

## 2021-02-22 RX ORDER — DIPHENHYDRAMINE HYDROCHLORIDE 50 MG/ML
INJECTION INTRAMUSCULAR; INTRAVENOUS
Status: DISCONTINUED
Start: 2021-02-22 | End: 2021-02-22

## 2021-02-22 RX ORDER — ONDANSETRON 2 MG/ML
4 INJECTION INTRAMUSCULAR; INTRAVENOUS ONCE
Status: COMPLETED | OUTPATIENT
Start: 2021-02-22 | End: 2021-02-22

## 2021-02-22 RX ORDER — MORPHINE SULFATE 2 MG/ML
2 INJECTION, SOLUTION INTRAMUSCULAR; INTRAVENOUS ONCE
Status: COMPLETED | OUTPATIENT
Start: 2021-02-22 | End: 2021-02-22

## 2021-02-22 RX ORDER — DIPHENHYDRAMINE HYDROCHLORIDE 50 MG/ML
50 INJECTION INTRAMUSCULAR; INTRAVENOUS ONCE
Status: COMPLETED | OUTPATIENT
Start: 2021-02-22 | End: 2021-02-22

## 2021-02-22 RX ORDER — SODIUM CHLORIDE 0.9 % (FLUSH) 0.9 %
10 SYRINGE (ML) INJECTION ONCE
Status: COMPLETED | OUTPATIENT
Start: 2021-02-22 | End: 2021-02-22

## 2021-02-22 RX ORDER — METHYLPREDNISOLONE SODIUM SUCCINATE 125 MG/2ML
125 INJECTION, POWDER, LYOPHILIZED, FOR SOLUTION INTRAMUSCULAR; INTRAVENOUS ONCE
Status: COMPLETED | OUTPATIENT
Start: 2021-02-22 | End: 2021-02-22

## 2021-02-22 RX ORDER — DIPHENOXYLATE HYDROCHLORIDE AND ATROPINE SULFATE 2.5; .025 MG/1; MG/1
1 TABLET ORAL 4 TIMES DAILY PRN
Status: DISCONTINUED | OUTPATIENT
Start: 2021-02-22 | End: 2021-02-22

## 2021-02-22 RX ADMIN — SODIUM CHLORIDE 0.9 % (FLUSH) 10 ML: 0.9 % SYRINGE (ML) INJECTION at 13:10:00

## 2021-02-22 NOTE — TELEPHONE ENCOUNTER
Called Cydney Banegas - he was treated today- Cancer Testis seminoma - 2/22/21- C1D8- Bleo    He is having a hard time swallowing and breathing with throat, sound like he is breathing heavily on phone. does not have benadryl available, instructed to call 911.

## 2021-02-22 NOTE — PROGRESS NOTES
Pt here for C1D8 Bleomycin .   Arrives Ambulating independently, accompanied by Self           Patient reports possible pregnancy since last therapy cycle: Not Applicable    Modifications in dose or schedule: No     Frequency of blood return and site check

## 2021-02-22 NOTE — ED PROVIDER NOTES
Patient Seen in: [facility] Emergency Department      History   Patient presents with:   Allergic Rxn Allergies    Stated Complaint: Allergic Reaction    HPI/Subjective:   HPI    58-year-old male presents emergency department complaining of shortness Temp 97.6 °F (36.4 °C)   Resp 24   Ht 180.3 cm (5' 11\")   Wt 97.1 kg   SpO2 100%   BMI 29.85 kg/m²         Physical Exam    All measures to prevent infection transmission during my interaction with the patient were taken.  The patient was already wearing a significant, or life threatening deterioration of the patient's clinical status.   The patient required my time at the bedside performing direct personal management, the absence of which would likely result in sudden, clinically significant or life threaten encounter    Disposition:  Discharge  2/22/2021  5:39 pm    Follow-up:  Annabelle Medeiros MD  [address] [place] [id number] [phone]                Medications Prescribed:  Current Discharge Medication List

## 2021-02-22 NOTE — PROGRESS NOTES
Outpatient Oncology Care Plan  Problem list:  knowledge deficit  Fatigue  Nausea and vomiting  Diarrhea  neuropathy     Problems related to:    chemotherapy     Interventions:  provided general teaching     Expected outcomes:  understands plan of care

## 2021-02-22 NOTE — PROGRESS NOTES
Hematology/Oncology Clinic Follow Up Visit    Patient Name: Briana Riojas  Medical Record Number: [id number]    YOB: 1980    PCP: Dr. Rebecca De Guzman  Other providers: Dr. Corinna Arreguin (urology)    Reason for Consultation:  Arun Weathres [place]. Nausea improved with taking Zofran and Compazine around-the-clock. He reports having only mild nausea last night. No emesis since discharged from the ED. The last couple days since discharge from the ED he has developed diarrhea.   He took the (eight) hours as needed for Nausea., Disp: 30 tablet, Rfl: 3    •  EPINEPHrine 0.3 MG/0.3ML Injection Solution Auto-injector, USE AS DIRECTED FOR ANAPHYLAXIS, THEN CALL 911.  Brand or generic ok., Disp: 2 each, Rfl: 1    •  hydrOXYzine HCl 10 MG Oral Tab, T oz)  02/20/21 : 101.6 kg (224 lb)  02/19/21 : 101.7 kg (224 lb 3.2 oz)  02/18/21 : 101.7 kg (224 lb 3.2 oz)  02/17/21 : 102.2 kg (225 lb 3.2 oz)  02/16/21 : 100.5 kg (221 lb 9.6 oz)    ECOG PS: 0    Physical Examination:  General: Patient is alert and orie 02/04/2021    HCGQUANT <1 01/06/2021     Imaging:    PFTs 2/13/21:  FVC 4.74L (90% pred)    FEV1 4.24L (102% pred)    FEV1/FVC 81%    DLCO 88% pred     Impression & Plan:     *Seminoma of right testis, stage IIA (T1N1) or IIIA (?if pulm mets)  -Enlarged re

## 2021-02-22 NOTE — TELEPHONE ENCOUNTER
Jesús called. He was in for treatment this morning and is now having a hard time swallowing. He states it hurts a lot to swallow as well. Please call.

## 2021-02-23 ENCOUNTER — TELEPHONE (OUTPATIENT)
Dept: HEMATOLOGY/ONCOLOGY | Facility: HOSPITAL | Age: 41
End: 2021-02-23

## 2021-02-23 NOTE — TELEPHONE ENCOUNTER
Patient called and said that he was in the emergency room yesterday because he had an allergic reaction to the chemo.  He wanted to discuss his plan of treatment with Dr. David Marie since he has an allergic reaction to the first medication.      =============

## 2021-02-25 ENCOUNTER — TELEPHONE (OUTPATIENT)
Dept: HEMATOLOGY/ONCOLOGY | Facility: HOSPITAL | Age: 41
End: 2021-02-25

## 2021-02-25 ENCOUNTER — OFFICE VISIT (OUTPATIENT)
Dept: HEMATOLOGY/ONCOLOGY | Facility: HOSPITAL | Age: 41
End: 2021-02-25
Attending: INTERNAL MEDICINE
Payer: COMMERCIAL

## 2021-02-25 VITALS
RESPIRATION RATE: 18 BRPM | HEIGHT: 70.39 IN | TEMPERATURE: 98 F | OXYGEN SATURATION: 100 % | WEIGHT: 218.63 LBS | DIASTOLIC BLOOD PRESSURE: 79 MMHG | SYSTOLIC BLOOD PRESSURE: 119 MMHG | BODY MASS INDEX: 30.95 KG/M2 | HEART RATE: 98 BPM

## 2021-02-25 DIAGNOSIS — K12.31 MUCOSITIS DUE TO CHEMOTHERAPY: Primary | ICD-10-CM

## 2021-02-25 DIAGNOSIS — C62.91 CANCER, TESTIS, SEMINOMA, RIGHT (HCC): ICD-10-CM

## 2021-02-25 PROCEDURE — 99215 OFFICE O/P EST HI 40 MIN: CPT | Performed by: CLINICAL NURSE SPECIALIST

## 2021-02-25 NOTE — TELEPHONE ENCOUNTER
Toxicities: C1 D8 Bleomycin/Etoposide/Cisplatin on 2/22/2021    Mucositis/Dysphagia    Mucositis Oral: Grade 2 (Yesterday patient had mouth pain and found he has red mouth ulcers on the roof of his mouth in the back of the throat.  He also has some sores on

## 2021-02-25 NOTE — PROGRESS NOTES
Cancer Center Progress Note    Patient Name: Leisa Heredia   YOB: 1980   Medical Record Number: [id number]   CSN: [id number]   Date of visit: 2/25/2021   Provider: SHAUN Castañeda  Referring Physician: Alonzo Salazar Patient Position: Sitting, Cuff Size: adult)   Pulse 98   Temp 98.1 °F (36.7 °C) (Tympanic)   Resp 18   Ht 1.788 m (5' 10.39\")   Wt 99.2 kg (218 lb 9.6 oz)   SpO2 100%   BMI 31.02 kg/m²     Medications:    Current Outpatient Medications:   •  Pantoprazole Disp: 2 each, Rfl: 1    Review of Systems:   As in HPI    Physical Examination:  General: Awake, alert, oriented x3, no acute distress.     HEENT:  Anicteric, conjunctivae and sclerae clear, no sinus tenderness, no visible oropharyngeal lesion/thrush, mucou

## 2021-02-25 NOTE — PROGRESS NOTES
Patient presents with:  Mouth Lesions: APN assessment - sick call    Pt is here for a sick call - mouth sores. Pt was last treated on 02/22/2021 C1 D8 bleomycin; seen in the ER that night for hypersensitivity reaction.  Today he notes increased mouth sorene

## 2021-02-26 ENCOUNTER — TELEPHONE (OUTPATIENT)
Dept: HEMATOLOGY/ONCOLOGY | Facility: HOSPITAL | Age: 41
End: 2021-02-26

## 2021-02-26 RX ORDER — AMOXICILLIN AND CLAVULANATE POTASSIUM 875; 125 MG/1; MG/1
1 TABLET, FILM COATED ORAL 2 TIMES DAILY
Qty: 20 TABLET | Refills: 0 | Status: SHIPPED | OUTPATIENT
Start: 2021-02-26 | End: 2021-03-08

## 2021-02-26 NOTE — TELEPHONE ENCOUNTER
2/22/21 - C1D8 - Bleo//Cisp    Was in [facility] yesterday for Mucositis/Odynophagia-doing mouth care and improving, pushing fluids.   He woke up today with severe sinus pressure, nasal congestion(yellow and bloody discharge) also affecting ears, and headache(to

## 2021-02-26 NOTE — TELEPHONE ENCOUNTER
Per SHAUN Dia's request, I called Rena Hernandez and updated him that she sent a Augmentin prescription to his pharmacy. She was not comfortable with ordering steroids. He may call his PCP and see if he wants to also prescribe prednisone.  Rena Hernandez verbalized understand

## 2021-02-26 NOTE — TELEPHONE ENCOUNTER
Jesús called back. He wants to know if Dr Fabián Coto or Mireille Perez are going to prescribe antibiotics or steroids for his ear infection or does he need to call his PCP? I spoke with Dr Napoleon rodriguez. Dr Fabián Coto is deferring to [facility].  I attempted to reach R

## 2021-03-01 ENCOUNTER — APPOINTMENT (OUTPATIENT)
Dept: HEMATOLOGY/ONCOLOGY | Facility: HOSPITAL | Age: 41
End: 2021-03-01
Attending: INTERNAL MEDICINE
Payer: COMMERCIAL

## 2021-03-05 RX ORDER — PANTOPRAZOLE SODIUM 40 MG/1
40 TABLET, DELAYED RELEASE ORAL DAILY
Qty: 30 TABLET | Refills: 5 | Status: SHIPPED | OUTPATIENT
Start: 2021-03-05 | End: 2021-05-10

## 2021-03-08 ENCOUNTER — OFFICE VISIT (OUTPATIENT)
Dept: HEMATOLOGY/ONCOLOGY | Facility: HOSPITAL | Age: 41
End: 2021-03-08
Attending: INTERNAL MEDICINE
Payer: COMMERCIAL

## 2021-03-08 VITALS
SYSTOLIC BLOOD PRESSURE: 122 MMHG | HEIGHT: 70.39 IN | OXYGEN SATURATION: 98 % | RESPIRATION RATE: 16 BRPM | DIASTOLIC BLOOD PRESSURE: 75 MMHG | TEMPERATURE: 98 F | HEART RATE: 114 BPM | BODY MASS INDEX: 30.04 KG/M2 | WEIGHT: 212.19 LBS

## 2021-03-08 DIAGNOSIS — C62.91 CANCER, TESTIS, SEMINOMA, RIGHT (HCC): Primary | ICD-10-CM

## 2021-03-08 DIAGNOSIS — K21.9 GASTROESOPHAGEAL REFLUX DISEASE, UNSPECIFIED WHETHER ESOPHAGITIS PRESENT: ICD-10-CM

## 2021-03-08 DIAGNOSIS — K52.1 CHEMOTHERAPY INDUCED DIARRHEA: ICD-10-CM

## 2021-03-08 DIAGNOSIS — R11.2 CINV (CHEMOTHERAPY-INDUCED NAUSEA AND VOMITING): ICD-10-CM

## 2021-03-08 DIAGNOSIS — T45.1X5A CINV (CHEMOTHERAPY-INDUCED NAUSEA AND VOMITING): ICD-10-CM

## 2021-03-08 DIAGNOSIS — T45.1X5A CHEMOTHERAPY INDUCED DIARRHEA: ICD-10-CM

## 2021-03-08 DIAGNOSIS — T78.40XD ALLERGIC REACTION TO DRUG, SUBSEQUENT ENCOUNTER: ICD-10-CM

## 2021-03-08 LAB
ALBUMIN SERPL-MCNC: 3.6 G/DL (ref 3.4–5)
ALBUMIN/GLOB SERPL: 1 {RATIO} (ref 1–2)
ALP LIVER SERPL-CCNC: 81 U/L
ALT SERPL-CCNC: 38 U/L
ANION GAP SERPL CALC-SCNC: 6 MMOL/L (ref 0–18)
AST SERPL-CCNC: 18 U/L (ref 15–37)
BASOPHILS # BLD: 0 X10(3) UL (ref 0–0.2)
BASOPHILS NFR BLD: 0 %
BILIRUB SERPL-MCNC: 0.2 MG/DL (ref 0.1–2)
BUN BLD-MCNC: 19 MG/DL (ref 7–18)
BUN/CREAT SERPL: 17.9 (ref 10–20)
CALCIUM BLD-MCNC: 8.9 MG/DL (ref 8.5–10.1)
CHLORIDE SERPL-SCNC: 105 MMOL/L (ref 98–112)
CO2 SERPL-SCNC: 27 MMOL/L (ref 21–32)
CREAT BLD-MCNC: 1.06 MG/DL
DEPRECATED RDW RBC AUTO: 39.9 FL (ref 35.1–46.3)
EOSINOPHIL # BLD: 0 X10(3) UL (ref 0–0.7)
EOSINOPHIL NFR BLD: 0 %
ERYTHROCYTE [DISTWIDTH] IN BLOOD BY AUTOMATED COUNT: 12.7 % (ref 11–15)
GLOBULIN PLAS-MCNC: 3.6 G/DL (ref 2.8–4.4)
GLUCOSE BLD-MCNC: 113 MG/DL (ref 70–99)
HCT VFR BLD AUTO: 34.6 %
HGB BLD-MCNC: 11.9 G/DL
LYMPHOCYTES NFR BLD: 19 %
LYMPHOCYTES NFR BLD: 2.36 X10(3) UL (ref 1–4)
M PROTEIN MFR SERPL ELPH: 7.2 G/DL (ref 6.4–8.2)
MCH RBC QN AUTO: 30.4 PG (ref 26–34)
MCHC RBC AUTO-ENTMCNC: 34.4 G/DL (ref 31–37)
MCV RBC AUTO: 88.5 FL
MONOCYTES # BLD: 0.37 X10(3) UL (ref 0.1–1)
MONOCYTES NFR BLD: 3 %
MORPHOLOGY: NORMAL
MYELOCYTES # BLD: 0.25 X10(3) UL
MYELOCYTES NFR BLD: 2 %
NEUTROPHILS # BLD AUTO: 8.15 X10 (3) UL (ref 1.5–7.7)
NEUTROPHILS NFR BLD: 74 %
NEUTS BAND NFR BLD: 2 %
NEUTS HYPERSEG # BLD: 9.42 X10(3) UL (ref 1.5–7.7)
OSMOLALITY SERPL CALC.SUM OF ELEC: 289 MOSM/KG (ref 275–295)
PLATELET # BLD AUTO: 398 10(3)UL (ref 150–450)
PLATELET MORPHOLOGY: NORMAL
POTASSIUM SERPL-SCNC: 3.9 MMOL/L (ref 3.5–5.1)
RBC # BLD AUTO: 3.91 X10(6)UL
SODIUM SERPL-SCNC: 138 MMOL/L (ref 136–145)
TOTAL CELLS COUNTED: 100
WBC # BLD AUTO: 12.4 X10(3) UL (ref 4–11)

## 2021-03-08 PROCEDURE — 85025 COMPLETE CBC W/AUTO DIFF WBC: CPT

## 2021-03-08 PROCEDURE — 96367 TX/PROPH/DG ADDL SEQ IV INF: CPT

## 2021-03-08 PROCEDURE — 85007 BL SMEAR W/DIFF WBC COUNT: CPT

## 2021-03-08 PROCEDURE — 96413 CHEMO IV INFUSION 1 HR: CPT

## 2021-03-08 PROCEDURE — 85027 COMPLETE CBC AUTOMATED: CPT

## 2021-03-08 PROCEDURE — 80053 COMPREHEN METABOLIC PANEL: CPT

## 2021-03-08 PROCEDURE — 96375 TX/PRO/DX INJ NEW DRUG ADDON: CPT

## 2021-03-08 PROCEDURE — 96417 CHEMO IV INFUS EACH ADDL SEQ: CPT

## 2021-03-08 PROCEDURE — 99215 OFFICE O/P EST HI 40 MIN: CPT | Performed by: INTERNAL MEDICINE

## 2021-03-08 PROCEDURE — 96366 THER/PROPH/DIAG IV INF ADDON: CPT

## 2021-03-08 NOTE — PROGRESS NOTES
Hematology/Oncology Clinic Follow Up Visit    Patient Name: Ankita Stevens  Medical Record Number: [id number]   YOB: 1980    PCP: Dr. Stacy De Los Santos  Other providers: Dr. Shira Ovalle (urology)    Reason for Consultation:  Yael Howard chemotherapy. He developed a hypersensitivity reaction to bleomycin on day 8 and therefore have omitted this going forward. Overall he is feeling much better over the last week. He finished a course of antibiotics for sinus infection.   His acid reflux i needed for Nausea., Disp: 30 tablet, Rfl: 3  Ondansetron HCl (ZOFRAN) 8 MG tablet, Take 1 tablet (8 mg total) by mouth every 8 (eight) hours as needed for Nausea., Disp: 30 tablet, Rfl: 3  EPINEPHrine 0.3 MG/0.3ML Injection Solution Auto-injector, USE AS D 3.2 oz) (03/08 0847)  BSA (Calculated - sq m): 2.15 sq meters (03/08 0847)  Pulse: 114 (03/08 0847)  BP: 122/75 (03/08 0847)  Temp: 97.7 °F (36.5 °C) (03/08 0847)  Do Not Use - Resp Rate: --  SpO2: 98 % (03/08 0847)    Wt Readings from Last 6 Encounters: CO2 27.0 03/08/2021     Lab Results   Component Value Date    INR 1.0 02/12/2021     Lab Results   Component Value Date     02/04/2021     Lab Results   Component Value Date    AFPTM 1.6 02/04/2021    AFPTM 2.8 01/06/2021     Lab Results   Component

## 2021-03-08 NOTE — PROGRESS NOTES
Pt here for C2D1.   Arrives Ambulating independently, accompanied by Self           Pregnancy screening: Not applicable    Modifications in dose or schedule: No     Frequency of blood return and site check throughout administration: Prior to administration

## 2021-03-09 ENCOUNTER — OFFICE VISIT (OUTPATIENT)
Dept: HEMATOLOGY/ONCOLOGY | Facility: HOSPITAL | Age: 41
End: 2021-03-09
Attending: INTERNAL MEDICINE
Payer: COMMERCIAL

## 2021-03-09 VITALS
SYSTOLIC BLOOD PRESSURE: 122 MMHG | OXYGEN SATURATION: 98 % | HEIGHT: 70.39 IN | RESPIRATION RATE: 20 BRPM | BODY MASS INDEX: 30.58 KG/M2 | HEART RATE: 118 BPM | WEIGHT: 216 LBS | TEMPERATURE: 98 F | DIASTOLIC BLOOD PRESSURE: 68 MMHG

## 2021-03-09 DIAGNOSIS — C62.91 CANCER, TESTIS, SEMINOMA, RIGHT (HCC): Primary | ICD-10-CM

## 2021-03-09 PROCEDURE — 96417 CHEMO IV INFUS EACH ADDL SEQ: CPT

## 2021-03-09 PROCEDURE — 96366 THER/PROPH/DIAG IV INF ADDON: CPT

## 2021-03-09 PROCEDURE — 96367 TX/PROPH/DG ADDL SEQ IV INF: CPT

## 2021-03-09 PROCEDURE — 96375 TX/PRO/DX INJ NEW DRUG ADDON: CPT

## 2021-03-09 PROCEDURE — 96413 CHEMO IV INFUSION 1 HR: CPT

## 2021-03-09 NOTE — PROGRESS NOTES
Pt here for C2D2.   Arrives Ambulating independently, accompanied by Self           Pregnancy screening: Not applicable    Modifications in dose or schedule: No     Frequency of blood return and site check throughout administration: Prior to administration

## 2021-03-10 ENCOUNTER — OFFICE VISIT (OUTPATIENT)
Dept: HEMATOLOGY/ONCOLOGY | Facility: HOSPITAL | Age: 41
End: 2021-03-10
Attending: INTERNAL MEDICINE
Payer: COMMERCIAL

## 2021-03-10 VITALS
HEART RATE: 110 BPM | BODY MASS INDEX: 30.92 KG/M2 | DIASTOLIC BLOOD PRESSURE: 75 MMHG | OXYGEN SATURATION: 98 % | SYSTOLIC BLOOD PRESSURE: 129 MMHG | HEIGHT: 70.39 IN | WEIGHT: 218.38 LBS | TEMPERATURE: 98 F | RESPIRATION RATE: 20 BRPM

## 2021-03-10 DIAGNOSIS — C62.91 CANCER, TESTIS, SEMINOMA, RIGHT (HCC): Primary | ICD-10-CM

## 2021-03-10 PROCEDURE — 96375 TX/PRO/DX INJ NEW DRUG ADDON: CPT

## 2021-03-10 PROCEDURE — 96367 TX/PROPH/DG ADDL SEQ IV INF: CPT

## 2021-03-10 PROCEDURE — 96413 CHEMO IV INFUSION 1 HR: CPT

## 2021-03-10 PROCEDURE — 96417 CHEMO IV INFUS EACH ADDL SEQ: CPT

## 2021-03-10 PROCEDURE — 96366 THER/PROPH/DIAG IV INF ADDON: CPT

## 2021-03-10 NOTE — PROGRESS NOTES
Education Record    Learner:  Patient    Disease / Diagnosis:  Testicular cancer    Barriers / Limitations:  None   Comments:    Method:  Brief focused   Comments:    General Topics:  Medication, Procedure, Side effects and symptom management and Plan of c

## 2021-03-11 ENCOUNTER — OFFICE VISIT (OUTPATIENT)
Dept: HEMATOLOGY/ONCOLOGY | Facility: HOSPITAL | Age: 41
End: 2021-03-11
Attending: INTERNAL MEDICINE
Payer: COMMERCIAL

## 2021-03-11 VITALS
HEART RATE: 97 BPM | OXYGEN SATURATION: 98 % | WEIGHT: 221 LBS | SYSTOLIC BLOOD PRESSURE: 117 MMHG | DIASTOLIC BLOOD PRESSURE: 74 MMHG | TEMPERATURE: 98 F | BODY MASS INDEX: 31.28 KG/M2 | HEIGHT: 70.39 IN | RESPIRATION RATE: 18 BRPM

## 2021-03-11 DIAGNOSIS — C62.91 CANCER, TESTIS, SEMINOMA, RIGHT (HCC): Primary | ICD-10-CM

## 2021-03-11 PROCEDURE — 96367 TX/PROPH/DG ADDL SEQ IV INF: CPT

## 2021-03-11 PROCEDURE — 96366 THER/PROPH/DIAG IV INF ADDON: CPT

## 2021-03-11 PROCEDURE — 96375 TX/PRO/DX INJ NEW DRUG ADDON: CPT

## 2021-03-11 PROCEDURE — 96417 CHEMO IV INFUS EACH ADDL SEQ: CPT

## 2021-03-11 PROCEDURE — 96413 CHEMO IV INFUSION 1 HR: CPT

## 2021-03-11 NOTE — PROGRESS NOTES
Pt here for C2D4 Cisplat/Etop.   Arrives Ambulating independently, accompanied by Self           Pregnancy screening: Not applicable    Modifications in dose or schedule: No     Frequency of blood return and site check throughout administration: Prior to ad

## 2021-03-12 ENCOUNTER — OFFICE VISIT (OUTPATIENT)
Dept: HEMATOLOGY/ONCOLOGY | Facility: HOSPITAL | Age: 41
End: 2021-03-12
Attending: INTERNAL MEDICINE
Payer: COMMERCIAL

## 2021-03-12 VITALS
HEART RATE: 117 BPM | SYSTOLIC BLOOD PRESSURE: 113 MMHG | WEIGHT: 219 LBS | HEIGHT: 70.39 IN | TEMPERATURE: 98 F | DIASTOLIC BLOOD PRESSURE: 73 MMHG | OXYGEN SATURATION: 97 % | BODY MASS INDEX: 31 KG/M2 | RESPIRATION RATE: 16 BRPM

## 2021-03-12 DIAGNOSIS — C62.91 CANCER, TESTIS, SEMINOMA, RIGHT (HCC): Primary | ICD-10-CM

## 2021-03-12 PROBLEM — T78.40XA DRUG-INDUCED HYPERSENSITIVITY REACTION: Status: ACTIVE | Noted: 2021-03-12

## 2021-03-12 PROCEDURE — 96367 TX/PROPH/DG ADDL SEQ IV INF: CPT

## 2021-03-12 PROCEDURE — 96413 CHEMO IV INFUSION 1 HR: CPT

## 2021-03-12 PROCEDURE — 96417 CHEMO IV INFUS EACH ADDL SEQ: CPT

## 2021-03-12 PROCEDURE — 96366 THER/PROPH/DIAG IV INF ADDON: CPT

## 2021-03-12 PROCEDURE — 96375 TX/PRO/DX INJ NEW DRUG ADDON: CPT

## 2021-03-12 NOTE — PROGRESS NOTES
Pt here for C 2 D 5 .   Arrives Ambulating independently, accompanied by Self           Pregnancy screening: Not applicable    Modifications in dose or schedule: No     Frequency of blood return and site check throughout administration: Prior to administrat

## 2021-03-13 ENCOUNTER — OFFICE VISIT (OUTPATIENT)
Dept: HEMATOLOGY/ONCOLOGY | Facility: HOSPITAL | Age: 41
End: 2021-03-13
Attending: INTERNAL MEDICINE
Payer: COMMERCIAL

## 2021-03-13 DIAGNOSIS — C62.91 CANCER, TESTIS, SEMINOMA, RIGHT (HCC): Primary | ICD-10-CM

## 2021-03-13 PROCEDURE — 96372 THER/PROPH/DIAG INJ SC/IM: CPT

## 2021-03-13 NOTE — PROGRESS NOTES
Education Record    Learner:  Patient    Disease / Diagnosis: Testicular Cancer    Barriers / Limitations:  None   Comments:    Method:  Brief focused   Comments:    General Topics:  Medication and Plan of care reviewed   Comments:    Outcome:  Shows under

## 2021-03-29 ENCOUNTER — OFFICE VISIT (OUTPATIENT)
Dept: HEMATOLOGY/ONCOLOGY | Facility: HOSPITAL | Age: 41
End: 2021-03-29
Attending: INTERNAL MEDICINE
Payer: COMMERCIAL

## 2021-03-29 VITALS
WEIGHT: 216.5 LBS | RESPIRATION RATE: 18 BRPM | TEMPERATURE: 98 F | BODY MASS INDEX: 30.65 KG/M2 | HEART RATE: 119 BPM | DIASTOLIC BLOOD PRESSURE: 69 MMHG | SYSTOLIC BLOOD PRESSURE: 118 MMHG | HEIGHT: 70.39 IN

## 2021-03-29 DIAGNOSIS — C62.91 MALIGNANT NEOPLASM OF RIGHT TESTIS, UNSPECIFIED WHETHER DESCENDED OR UNDESCENDED (HCC): Primary | ICD-10-CM

## 2021-03-29 DIAGNOSIS — C62.91 CANCER, TESTIS, SEMINOMA, RIGHT (HCC): Primary | ICD-10-CM

## 2021-03-29 DIAGNOSIS — K21.9 GASTROESOPHAGEAL REFLUX DISEASE, UNSPECIFIED WHETHER ESOPHAGITIS PRESENT: ICD-10-CM

## 2021-03-29 DIAGNOSIS — D64.81 ANEMIA DUE TO CHEMOTHERAPY: ICD-10-CM

## 2021-03-29 DIAGNOSIS — T45.1X5A CHEMOTHERAPY-INDUCED FATIGUE: ICD-10-CM

## 2021-03-29 DIAGNOSIS — K52.1 CHEMOTHERAPY INDUCED DIARRHEA: ICD-10-CM

## 2021-03-29 DIAGNOSIS — R53.83 CHEMOTHERAPY-INDUCED FATIGUE: ICD-10-CM

## 2021-03-29 DIAGNOSIS — T45.1X5A CHEMOTHERAPY INDUCED DIARRHEA: ICD-10-CM

## 2021-03-29 DIAGNOSIS — T45.1X5A ANEMIA DUE TO CHEMOTHERAPY: ICD-10-CM

## 2021-03-29 DIAGNOSIS — R11.0 NAUSEA: ICD-10-CM

## 2021-03-29 LAB
ALBUMIN SERPL-MCNC: 3.6 G/DL (ref 3.4–5)
ALBUMIN/GLOB SERPL: 1 {RATIO} (ref 1–2)
ALP LIVER SERPL-CCNC: 78 U/L
ALT SERPL-CCNC: 26 U/L
ANION GAP SERPL CALC-SCNC: 5 MMOL/L (ref 0–18)
AST SERPL-CCNC: 22 U/L (ref 15–37)
BASOPHILS # BLD: 0 X10(3) UL (ref 0–0.2)
BASOPHILS NFR BLD: 0 %
BILIRUB SERPL-MCNC: 0.2 MG/DL (ref 0.1–2)
BUN BLD-MCNC: 20 MG/DL (ref 7–18)
BUN/CREAT SERPL: 19.2 (ref 10–20)
CALCIUM BLD-MCNC: 8.7 MG/DL (ref 8.5–10.1)
CHLORIDE SERPL-SCNC: 109 MMOL/L (ref 98–112)
CO2 SERPL-SCNC: 26 MMOL/L (ref 21–32)
CREAT BLD-MCNC: 1.04 MG/DL
DEPRECATED RDW RBC AUTO: 40.8 FL (ref 35.1–46.3)
EOSINOPHIL # BLD: 0.13 X10(3) UL (ref 0–0.7)
EOSINOPHIL NFR BLD: 1 %
ERYTHROCYTE [DISTWIDTH] IN BLOOD BY AUTOMATED COUNT: 14 % (ref 11–15)
GLOBULIN PLAS-MCNC: 3.5 G/DL (ref 2.8–4.4)
GLUCOSE BLD-MCNC: 112 MG/DL (ref 70–99)
HCT VFR BLD AUTO: 27.8 %
HGB BLD-MCNC: 9.5 G/DL
LYMPHOCYTES NFR BLD: 19 %
LYMPHOCYTES NFR BLD: 2.47 X10(3) UL (ref 1–4)
M PROTEIN MFR SERPL ELPH: 7.1 G/DL (ref 6.4–8.2)
MCH RBC QN AUTO: 30.2 PG (ref 26–34)
MCHC RBC AUTO-ENTMCNC: 34.2 G/DL (ref 31–37)
MCV RBC AUTO: 88.3 FL
MONOCYTES # BLD: 1.56 X10(3) UL (ref 0.1–1)
MONOCYTES NFR BLD: 12 %
MORPHOLOGY: NORMAL
NEUTROPHILS # BLD AUTO: 7.99 X10 (3) UL (ref 1.5–7.7)
NEUTROPHILS NFR BLD: 63 %
NEUTS BAND NFR BLD: 5 %
NEUTS HYPERSEG # BLD: 8.84 X10(3) UL (ref 1.5–7.7)
NRBC BLD MANUAL-RTO: 3 %
OSMOLALITY SERPL CALC.SUM OF ELEC: 293 MOSM/KG (ref 275–295)
PATIENT FASTING Y/N/NP: NO
PLATELET # BLD AUTO: 392 10(3)UL (ref 150–450)
PLATELET MORPHOLOGY: NORMAL
POTASSIUM SERPL-SCNC: 3.7 MMOL/L (ref 3.5–5.1)
RBC # BLD AUTO: 3.15 X10(6)UL
SODIUM SERPL-SCNC: 140 MMOL/L (ref 136–145)
TOTAL CELLS COUNTED: 100
WBC # BLD AUTO: 13 X10(3) UL (ref 4–11)

## 2021-03-29 PROCEDURE — 85027 COMPLETE CBC AUTOMATED: CPT

## 2021-03-29 PROCEDURE — 96375 TX/PRO/DX INJ NEW DRUG ADDON: CPT

## 2021-03-29 PROCEDURE — 80053 COMPREHEN METABOLIC PANEL: CPT

## 2021-03-29 PROCEDURE — 96417 CHEMO IV INFUS EACH ADDL SEQ: CPT

## 2021-03-29 PROCEDURE — 99215 OFFICE O/P EST HI 40 MIN: CPT | Performed by: CLINICAL NURSE SPECIALIST

## 2021-03-29 PROCEDURE — 85025 COMPLETE CBC W/AUTO DIFF WBC: CPT

## 2021-03-29 PROCEDURE — 96413 CHEMO IV INFUSION 1 HR: CPT

## 2021-03-29 PROCEDURE — 85007 BL SMEAR W/DIFF WBC COUNT: CPT

## 2021-03-29 PROCEDURE — 96366 THER/PROPH/DIAG IV INF ADDON: CPT

## 2021-03-29 PROCEDURE — 96367 TX/PROPH/DG ADDL SEQ IV INF: CPT

## 2021-03-29 NOTE — PROGRESS NOTES
Cancer Center Progress Note    Patient Name: Radha Shabazz   YOB: 1980   Medical Record Number: [id number]   CSN: [id number]   Date of visit: 3/29/2021  Provider: Humble Ortiz  Referring Physician: Karan Lagos    Problem List: retroperitoneal adenopathy (several periaortic nodes, measuring up to 1.1 cm) not seen on prior CT of the abdomen 12/13/2018.   Small 5 mm nodule anterior to the right psoas muscle at the level of the iliac crest.   -2/15/21- cycle 1 BEP (bleomycin 30 units tablet, Rfl: 0  •  LORazepam 0.5 MG Oral Tab, Take 1-2 tablets (0.5-1 mg total) by mouth every 6 (six) hours as needed for Anxiety. , Disp: 40 tablet, Rfl: 0  •  Calcium Carbonate Antacid 500 MG Oral Chew Tab, Chew 1 tablet by mouth daily. , Disp: , Rfl:   • normoactive bowel sounds, soft,nontender, no hepatosplenomegaly.   Extremities:  No edema, no tenderness  Neuro:  CN 2-12 intact  Derm: No rashes; inguinal folds with hyperpigmentation    Labs:  Recent Results (from the past 24 hour(s))   COMP METABOLIC PAN Monocyte % Manual 12 %    Eosinophil % Manual 1 %    Basophil % Manual 0 %    NRBC 3 (H) 0    Total Cells Counted 100     RBC Morphology Normal Normal, Slide reviewed, see previous RBC morphology.     Platelet Morphology Normal Normal           Impression/P reduce to daily after chemo week.     Height: 178.8 cm (5' 10.39\") (03/29 0858)  Weight: 98.2 kg (216 lb 8 oz) (03/29 0858)  BSA (Calculated - sq m): 2.17 sq meters (03/29 0858)  Pulse: 119 (03/29 0858)  BP: 118/69 (03/29 0858)  Temp: 97.8 °F (36.6 °C) (03

## 2021-03-29 NOTE — PROGRESS NOTES
Cancer Center Progress Note    Patient Name: Josephine Donaldson   YOB: 1980   Medical Record Number: [id number]   CSN: [id number]   Date of visit: 3/29/2021  Provider: Fabian Daniel  Referring Physician: Augusto Almazan    Problem List: retroperitoneal adenopathy (several periaortic nodes, measuring up to 1.1 cm) not seen on prior CT of the abdomen 12/13/2018.   Small 5 mm nodule anterior to the right psoas muscle at the level of the iliac crest.   -2/15/21- cycle 1 BEP (bleomycin 30 units tablet, Rfl: 0  •  LORazepam 0.5 MG Oral Tab, Take 1-2 tablets (0.5-1 mg total) by mouth every 6 (six) hours as needed for Anxiety. , Disp: 40 tablet, Rfl: 0  •  Calcium Carbonate Antacid 500 MG Oral Chew Tab, Chew 1 tablet by mouth daily. , Disp: , Rfl:   • normoactive bowel sounds, soft,nontender, no hepatosplenomegaly.   Extremities:  No edema, no tenderness  Neuro:  CN 2-12 intact  Derm: No rashes; inguinal folds with hyperpigmentation    Labs:  Recent Results (from the past 24 hour(s))   COMP METABOLIC PAN chemotherapy. 5) Fatigue: Continue to monitor Hgb and electrolytes. Pt to receive IVF with chemotherapy infusion today. 6) Anemia: Continue to monitor Hgb.   7) Hyperpigmentation of inguinal folds: Pt states he has bruising, denies pain, itching, rash or

## 2021-03-29 NOTE — PROGRESS NOTES
Patient presents with: Follow - Up: APN assessment prior to treatment    Pt is here for treatment; C3 D1 BEP is expected. Pt states that he's been more fatigued with the last cycle.  He is eating and drinking; mild nausea after treatment resolved with [facility]

## 2021-03-30 ENCOUNTER — OFFICE VISIT (OUTPATIENT)
Dept: HEMATOLOGY/ONCOLOGY | Facility: HOSPITAL | Age: 41
End: 2021-03-30
Attending: INTERNAL MEDICINE
Payer: COMMERCIAL

## 2021-03-30 VITALS
SYSTOLIC BLOOD PRESSURE: 112 MMHG | DIASTOLIC BLOOD PRESSURE: 69 MMHG | HEART RATE: 114 BPM | HEIGHT: 70.39 IN | BODY MASS INDEX: 30.72 KG/M2 | OXYGEN SATURATION: 97 % | WEIGHT: 217 LBS | RESPIRATION RATE: 16 BRPM | TEMPERATURE: 98 F

## 2021-03-30 DIAGNOSIS — C62.91 CANCER, TESTIS, SEMINOMA, RIGHT (HCC): Primary | ICD-10-CM

## 2021-03-30 PROCEDURE — 96367 TX/PROPH/DG ADDL SEQ IV INF: CPT

## 2021-03-30 PROCEDURE — 96413 CHEMO IV INFUSION 1 HR: CPT

## 2021-03-30 PROCEDURE — 96375 TX/PRO/DX INJ NEW DRUG ADDON: CPT

## 2021-03-30 PROCEDURE — 96366 THER/PROPH/DIAG IV INF ADDON: CPT

## 2021-03-30 PROCEDURE — 96417 CHEMO IV INFUS EACH ADDL SEQ: CPT

## 2021-03-30 RX ORDER — SODIUM CHLORIDE 0.9 % (FLUSH) 0.9 %
10 SYRINGE (ML) INJECTION ONCE
Status: CANCELLED | OUTPATIENT
Start: 2021-03-30

## 2021-03-30 RX ORDER — SODIUM CHLORIDE 0.9 % (FLUSH) 0.9 %
10 SYRINGE (ML) INJECTION ONCE
Status: COMPLETED | OUTPATIENT
Start: 2021-03-30 | End: 2021-03-30

## 2021-03-30 RX ADMIN — SODIUM CHLORIDE 0.9 % (FLUSH) 10 ML: 0.9 % SYRINGE (ML) INJECTION at 14:16:00

## 2021-03-30 NOTE — PROGRESS NOTES
Pt here for C3D2.   Arrives Ambulating independently, accompanied by Self           Pregnancy screening: Not applicable    Modifications in dose or schedule: No     Frequency of blood return and site check throughout administration: Prior to administration

## 2021-03-31 ENCOUNTER — OFFICE VISIT (OUTPATIENT)
Dept: HEMATOLOGY/ONCOLOGY | Facility: HOSPITAL | Age: 41
End: 2021-03-31
Attending: INTERNAL MEDICINE
Payer: COMMERCIAL

## 2021-03-31 VITALS
HEIGHT: 70.39 IN | TEMPERATURE: 98 F | WEIGHT: 220.38 LBS | BODY MASS INDEX: 31.2 KG/M2 | OXYGEN SATURATION: 97 % | SYSTOLIC BLOOD PRESSURE: 131 MMHG | RESPIRATION RATE: 18 BRPM | HEART RATE: 118 BPM | DIASTOLIC BLOOD PRESSURE: 71 MMHG

## 2021-03-31 DIAGNOSIS — C62.91 CANCER, TESTIS, SEMINOMA, RIGHT (HCC): Primary | ICD-10-CM

## 2021-03-31 PROCEDURE — 96366 THER/PROPH/DIAG IV INF ADDON: CPT

## 2021-03-31 PROCEDURE — 96417 CHEMO IV INFUS EACH ADDL SEQ: CPT

## 2021-03-31 PROCEDURE — 96375 TX/PRO/DX INJ NEW DRUG ADDON: CPT

## 2021-03-31 PROCEDURE — 96413 CHEMO IV INFUSION 1 HR: CPT

## 2021-03-31 PROCEDURE — 96367 TX/PROPH/DG ADDL SEQ IV INF: CPT

## 2021-03-31 NOTE — PROGRESS NOTES
Pt here for C 3 D 3 .   Arrives Ambulating independently, accompanied by Self           Pregnancy screening: Not applicable    Modifications in dose or schedule: No     Frequency of blood return and site check throughout administration: Prior to administrat

## 2021-04-01 ENCOUNTER — OFFICE VISIT (OUTPATIENT)
Dept: HEMATOLOGY/ONCOLOGY | Facility: HOSPITAL | Age: 41
End: 2021-04-01
Attending: INTERNAL MEDICINE
Payer: COMMERCIAL

## 2021-04-01 VITALS
DIASTOLIC BLOOD PRESSURE: 75 MMHG | WEIGHT: 221 LBS | BODY MASS INDEX: 31.28 KG/M2 | OXYGEN SATURATION: 99 % | HEIGHT: 70.39 IN | RESPIRATION RATE: 20 BRPM | HEART RATE: 102 BPM | TEMPERATURE: 98 F | SYSTOLIC BLOOD PRESSURE: 116 MMHG

## 2021-04-01 DIAGNOSIS — C62.91 CANCER, TESTIS, SEMINOMA, RIGHT (HCC): Primary | ICD-10-CM

## 2021-04-01 PROCEDURE — 96417 CHEMO IV INFUS EACH ADDL SEQ: CPT

## 2021-04-01 PROCEDURE — 96413 CHEMO IV INFUSION 1 HR: CPT

## 2021-04-01 PROCEDURE — 96367 TX/PROPH/DG ADDL SEQ IV INF: CPT

## 2021-04-01 PROCEDURE — 96375 TX/PRO/DX INJ NEW DRUG ADDON: CPT

## 2021-04-01 PROCEDURE — 96366 THER/PROPH/DIAG IV INF ADDON: CPT

## 2021-04-01 NOTE — PROGRESS NOTES
Education Record    Learner:  Patient    Disease / Lydia GOMEZ    Barriers / Limitations:  None   Comments:    Method:  Discussion   Comments:    General Topics:  Plan of care reviewed   Comments:    Outcome:  Shows understanding   Comments:

## 2021-04-02 ENCOUNTER — OFFICE VISIT (OUTPATIENT)
Dept: HEMATOLOGY/ONCOLOGY | Facility: HOSPITAL | Age: 41
End: 2021-04-02
Attending: INTERNAL MEDICINE
Payer: COMMERCIAL

## 2021-04-02 VITALS
HEIGHT: 70.39 IN | DIASTOLIC BLOOD PRESSURE: 72 MMHG | WEIGHT: 220.38 LBS | BODY MASS INDEX: 31.2 KG/M2 | OXYGEN SATURATION: 97 % | HEART RATE: 101 BPM | SYSTOLIC BLOOD PRESSURE: 120 MMHG | RESPIRATION RATE: 20 BRPM | TEMPERATURE: 98 F

## 2021-04-02 DIAGNOSIS — C62.91 CANCER, TESTIS, SEMINOMA, RIGHT (HCC): Primary | ICD-10-CM

## 2021-04-02 PROCEDURE — 96366 THER/PROPH/DIAG IV INF ADDON: CPT

## 2021-04-02 PROCEDURE — 96413 CHEMO IV INFUSION 1 HR: CPT

## 2021-04-02 PROCEDURE — 96375 TX/PRO/DX INJ NEW DRUG ADDON: CPT

## 2021-04-02 PROCEDURE — 96417 CHEMO IV INFUS EACH ADDL SEQ: CPT

## 2021-04-02 PROCEDURE — 96367 TX/PROPH/DG ADDL SEQ IV INF: CPT

## 2021-04-02 NOTE — PROGRESS NOTES
Patient states he has been feeling short of breath since Monday upon mild/moderate exertion. When he rests he is able to catch his breath. hgb Monday 9.5. Appt sched. For Monday 4/5 to check patient's hgb to see if blood transfusion is needed. Pt.  Aware an

## 2021-04-03 ENCOUNTER — OFFICE VISIT (OUTPATIENT)
Dept: HEMATOLOGY/ONCOLOGY | Facility: HOSPITAL | Age: 41
End: 2021-04-03
Attending: INTERNAL MEDICINE
Payer: COMMERCIAL

## 2021-04-03 DIAGNOSIS — C62.91 CANCER, TESTIS, SEMINOMA, RIGHT (HCC): Primary | ICD-10-CM

## 2021-04-03 PROCEDURE — 96372 THER/PROPH/DIAG INJ SC/IM: CPT

## 2021-04-03 NOTE — PROGRESS NOTES
Education Record    Learner:  Patient    Disease / Diagnosis: ziextenzo inj     Barriers / Limitations:  None   Comments:    Method:  Brief focused   Comments:    General Topics:  Plan of care reviewed   Comments:    Outcome:  Shows understanding   Comment

## 2021-04-05 ENCOUNTER — OFFICE VISIT (OUTPATIENT)
Dept: HEMATOLOGY/ONCOLOGY | Facility: HOSPITAL | Age: 41
End: 2021-04-05
Attending: INTERNAL MEDICINE
Payer: COMMERCIAL

## 2021-04-05 VITALS
SYSTOLIC BLOOD PRESSURE: 128 MMHG | TEMPERATURE: 99 F | HEIGHT: 70.39 IN | HEART RATE: 100 BPM | OXYGEN SATURATION: 97 % | DIASTOLIC BLOOD PRESSURE: 78 MMHG | WEIGHT: 215 LBS | BODY MASS INDEX: 30.44 KG/M2 | RESPIRATION RATE: 18 BRPM

## 2021-04-05 DIAGNOSIS — C62.91 CANCER, TESTIS, SEMINOMA, RIGHT (HCC): Primary | ICD-10-CM

## 2021-04-05 PROCEDURE — 86900 BLOOD TYPING SEROLOGIC ABO: CPT

## 2021-04-05 PROCEDURE — 86901 BLOOD TYPING SEROLOGIC RH(D): CPT

## 2021-04-05 PROCEDURE — 86850 RBC ANTIBODY SCREEN: CPT

## 2021-04-05 PROCEDURE — 85027 COMPLETE CBC AUTOMATED: CPT

## 2021-04-05 PROCEDURE — 85025 COMPLETE CBC W/AUTO DIFF WBC: CPT

## 2021-04-05 PROCEDURE — 85007 BL SMEAR W/DIFF WBC COUNT: CPT

## 2021-04-05 PROCEDURE — 96360 HYDRATION IV INFUSION INIT: CPT

## 2021-04-05 RX ORDER — PROCHLORPERAZINE MALEATE 10 MG
10 TABLET ORAL ONCE
Status: COMPLETED | OUTPATIENT
Start: 2021-04-05 | End: 2021-04-05

## 2021-04-05 RX ORDER — SODIUM CHLORIDE 0.9 % (FLUSH) 0.9 %
10 SYRINGE (ML) INJECTION ONCE
Status: COMPLETED | OUTPATIENT
Start: 2021-04-05 | End: 2021-04-05

## 2021-04-05 RX ORDER — SODIUM CHLORIDE 0.9 % (FLUSH) 0.9 %
10 SYRINGE (ML) INJECTION ONCE
Status: CANCELLED | OUTPATIENT
Start: 2021-04-05

## 2021-04-05 RX ADMIN — SODIUM CHLORIDE 0.9 % (FLUSH) 10 ML: 0.9 % SYRINGE (ML) INJECTION at 13:00:00

## 2021-04-05 RX ADMIN — PROCHLORPERAZINE MALEATE 10 MG: 10 MG TABLET ORAL at 13:46:00

## 2021-04-05 NOTE — PROGRESS NOTES
Patient here this afternoon with C/O fatigue since the weekend. Hgb today > 9.0 Sat. 98% on RA. Patient states he felt better at home after he drank Gatorade. Patient feels that 500 ml NS will further enhance his feeling better.   Patient tolerated 500 m

## 2021-04-07 ENCOUNTER — DOCUMENTATION ONLY (OUTPATIENT)
Dept: HEMATOLOGY/ONCOLOGY | Facility: HOSPITAL | Age: 41
End: 2021-04-07

## 2021-04-19 ENCOUNTER — OFFICE VISIT (OUTPATIENT)
Dept: HEMATOLOGY/ONCOLOGY | Facility: HOSPITAL | Age: 41
End: 2021-04-19
Attending: INTERNAL MEDICINE
Payer: COMMERCIAL

## 2021-04-19 VITALS
DIASTOLIC BLOOD PRESSURE: 77 MMHG | HEART RATE: 111 BPM | RESPIRATION RATE: 18 BRPM | OXYGEN SATURATION: 97 % | BODY MASS INDEX: 30.86 KG/M2 | SYSTOLIC BLOOD PRESSURE: 155 MMHG | HEIGHT: 70.39 IN | TEMPERATURE: 98 F | WEIGHT: 218 LBS

## 2021-04-19 DIAGNOSIS — D64.81 ANEMIA ASSOCIATED WITH CHEMOTHERAPY: ICD-10-CM

## 2021-04-19 DIAGNOSIS — K21.9 GASTROESOPHAGEAL REFLUX DISEASE, UNSPECIFIED WHETHER ESOPHAGITIS PRESENT: ICD-10-CM

## 2021-04-19 DIAGNOSIS — C62.91 CANCER, TESTIS, SEMINOMA, RIGHT (HCC): Primary | ICD-10-CM

## 2021-04-19 DIAGNOSIS — T45.1X5A ANEMIA ASSOCIATED WITH CHEMOTHERAPY: ICD-10-CM

## 2021-04-19 DIAGNOSIS — B35.6 TINEA CRURIS: ICD-10-CM

## 2021-04-19 DIAGNOSIS — K52.1 CHEMOTHERAPY INDUCED DIARRHEA: ICD-10-CM

## 2021-04-19 DIAGNOSIS — E87.6 HYPOKALEMIA: ICD-10-CM

## 2021-04-19 DIAGNOSIS — R11.2 CINV (CHEMOTHERAPY-INDUCED NAUSEA AND VOMITING): ICD-10-CM

## 2021-04-19 DIAGNOSIS — T45.1X5A CINV (CHEMOTHERAPY-INDUCED NAUSEA AND VOMITING): ICD-10-CM

## 2021-04-19 DIAGNOSIS — T45.1X5A CHEMOTHERAPY INDUCED DIARRHEA: ICD-10-CM

## 2021-04-19 DIAGNOSIS — T78.40XD ALLERGIC REACTION TO DRUG, SUBSEQUENT ENCOUNTER: ICD-10-CM

## 2021-04-19 PROCEDURE — 96413 CHEMO IV INFUSION 1 HR: CPT

## 2021-04-19 PROCEDURE — 96417 CHEMO IV INFUS EACH ADDL SEQ: CPT

## 2021-04-19 PROCEDURE — 85007 BL SMEAR W/DIFF WBC COUNT: CPT

## 2021-04-19 PROCEDURE — 96366 THER/PROPH/DIAG IV INF ADDON: CPT

## 2021-04-19 PROCEDURE — 85025 COMPLETE CBC W/AUTO DIFF WBC: CPT

## 2021-04-19 PROCEDURE — 85027 COMPLETE CBC AUTOMATED: CPT

## 2021-04-19 PROCEDURE — 99215 OFFICE O/P EST HI 40 MIN: CPT | Performed by: INTERNAL MEDICINE

## 2021-04-19 PROCEDURE — 96367 TX/PROPH/DG ADDL SEQ IV INF: CPT

## 2021-04-19 PROCEDURE — 96375 TX/PRO/DX INJ NEW DRUG ADDON: CPT

## 2021-04-19 PROCEDURE — 80053 COMPREHEN METABOLIC PANEL: CPT

## 2021-04-19 RX ORDER — NYSTATIN 100000 [USP'U]/G
1 POWDER TOPICAL 3 TIMES DAILY
Qty: 30 G | Refills: 0 | Status: SHIPPED | OUTPATIENT
Start: 2021-04-19 | End: 2021-05-20 | Stop reason: ALTCHOICE

## 2021-04-19 NOTE — PROGRESS NOTES
Hematology/Oncology Clinic Follow Up Visit    Patient Name: Judy Patel  Medical Record Number: [id number]   YOB: 1980    PCP: Dr. Ann Marie Lyn  Other providers: Dr. Yasmany Lo (urology)    Reason for Consultation:  Yonny Grubbs Presents for follow-up and for cycle 4 chemotherapy. He reports having some dyspnea with exertion and fatigue though no dyspnea at rest.  No chest pain. No recent fevers or infections. He had some nausea and vomiting last week but is better now.   He too as needed for Nausea., Disp: 30 tablet, Rfl: 3  EPINEPHrine 0.3 MG/0.3ML Injection Solution Auto-injector, USE AS DIRECTED FOR ANAPHYLAXIS, THEN CALL 911.  Brand or generic ok., Disp: 2 each, Rfl: 1  hydrOXYzine HCl 10 MG Oral Tab, TAKE 1 TABLET BY MOUTH EV (04/19 6296)  Do Not Use - Resp Rate: --  SpO2: 97 % (04/19 0858)    Wt Readings from Last 6 Encounters:  04/19/21 : 98.9 kg (218 lb)  04/05/21 : 97.5 kg (215 lb)  04/02/21 : 100 kg (220 lb 6.4 oz)  04/01/21 : 100.2 kg (221 lb)  03/31/21 : 100 kg (220 lb 6 1.0 02/12/2021     Lab Results   Component Value Date     02/04/2021     Lab Results   Component Value Date    AFPTM 1.6 02/04/2021    AFPTM 2.8 01/06/2021     Lab Results   Component Value Date    HCGQUANT <1 02/04/2021    HCGQUANT <1 01/06/2021 dry.    *Hypokalemia  -Advised patient to consume a potassium rich diet.   Repeat BMP on Friday, if not improving will start K-Dur.      RTC in 3 weeks    Risk level: High- testicular cancer on active chemotherapy requiring close monitoring    Jimena Montoya

## 2021-04-19 NOTE — PROGRESS NOTES
Outpatient Oncology Care Plan  Problem list:  knowledge deficit  Fatigue  Nausea and vomiting     Problems related to:    chemotherapy     Interventions:  provided general teaching     Expected outcomes:  understands plan of care     Progress towards outco

## 2021-04-19 NOTE — PROGRESS NOTES
Pt here for C 4 D 1 Cisplatin/etoposide.   Arrives Ambulating independently, accompanied by Self           Pregnancy screening: Not applicable    Modifications in dose or schedule: No     Frequency of blood return and site check throughout administration: P

## 2021-04-20 ENCOUNTER — OFFICE VISIT (OUTPATIENT)
Dept: HEMATOLOGY/ONCOLOGY | Facility: HOSPITAL | Age: 41
End: 2021-04-20
Attending: INTERNAL MEDICINE
Payer: COMMERCIAL

## 2021-04-20 VITALS
HEART RATE: 118 BPM | TEMPERATURE: 98 F | RESPIRATION RATE: 18 BRPM | WEIGHT: 218.19 LBS | HEIGHT: 70.39 IN | OXYGEN SATURATION: 97 % | DIASTOLIC BLOOD PRESSURE: 69 MMHG | SYSTOLIC BLOOD PRESSURE: 111 MMHG | BODY MASS INDEX: 30.89 KG/M2

## 2021-04-20 DIAGNOSIS — C62.91 CANCER, TESTIS, SEMINOMA, RIGHT (HCC): Primary | ICD-10-CM

## 2021-04-20 PROCEDURE — 96417 CHEMO IV INFUS EACH ADDL SEQ: CPT

## 2021-04-20 PROCEDURE — 96366 THER/PROPH/DIAG IV INF ADDON: CPT

## 2021-04-20 PROCEDURE — 96367 TX/PROPH/DG ADDL SEQ IV INF: CPT

## 2021-04-20 PROCEDURE — 96375 TX/PRO/DX INJ NEW DRUG ADDON: CPT

## 2021-04-20 PROCEDURE — 96413 CHEMO IV INFUSION 1 HR: CPT

## 2021-04-20 NOTE — PROGRESS NOTES
Pt here for C4D2 Cisplatin/Etoposide.   Arrives Ambulating independently, accompanied by Self           Patient reports possible pregnancy since last therapy cycle: Not Applicable    Modifications in dose or schedule: no     Frequency of blood return and si

## 2021-04-21 ENCOUNTER — OFFICE VISIT (OUTPATIENT)
Dept: HEMATOLOGY/ONCOLOGY | Facility: HOSPITAL | Age: 41
End: 2021-04-21
Attending: INTERNAL MEDICINE
Payer: COMMERCIAL

## 2021-04-21 VITALS
SYSTOLIC BLOOD PRESSURE: 151 MMHG | HEART RATE: 106 BPM | WEIGHT: 218.19 LBS | DIASTOLIC BLOOD PRESSURE: 71 MMHG | OXYGEN SATURATION: 98 % | RESPIRATION RATE: 18 BRPM | HEIGHT: 70.39 IN | TEMPERATURE: 98 F | BODY MASS INDEX: 30.89 KG/M2

## 2021-04-21 DIAGNOSIS — C62.91 CANCER, TESTIS, SEMINOMA, RIGHT (HCC): Primary | ICD-10-CM

## 2021-04-21 PROCEDURE — 96375 TX/PRO/DX INJ NEW DRUG ADDON: CPT

## 2021-04-21 PROCEDURE — 96417 CHEMO IV INFUS EACH ADDL SEQ: CPT

## 2021-04-21 PROCEDURE — 96367 TX/PROPH/DG ADDL SEQ IV INF: CPT

## 2021-04-21 PROCEDURE — 96366 THER/PROPH/DIAG IV INF ADDON: CPT

## 2021-04-21 PROCEDURE — 96413 CHEMO IV INFUSION 1 HR: CPT

## 2021-04-21 NOTE — PROGRESS NOTES
Patient verbalizes shortness of breath noted with walking/exertion. Denies that it has worsened since Monday. Will continue to monitor. Patient will have CBC prior to treatment on Friday and possible transfusion.

## 2021-04-21 NOTE — PROGRESS NOTES
Pt here for C 4 D 3 Cisplatin/Etoposide.   Arrives Ambulating independently, accompanied by Self           Pregnancy screening: Not applicable    Modifications in dose or schedule: No     Frequency of blood return and site check throughout administration: P

## 2021-04-22 ENCOUNTER — OFFICE VISIT (OUTPATIENT)
Dept: HEMATOLOGY/ONCOLOGY | Facility: HOSPITAL | Age: 41
End: 2021-04-22
Attending: INTERNAL MEDICINE
Payer: COMMERCIAL

## 2021-04-22 VITALS
DIASTOLIC BLOOD PRESSURE: 76 MMHG | WEIGHT: 219.38 LBS | RESPIRATION RATE: 18 BRPM | TEMPERATURE: 98 F | OXYGEN SATURATION: 99 % | HEIGHT: 70.39 IN | HEART RATE: 100 BPM | BODY MASS INDEX: 31.06 KG/M2 | SYSTOLIC BLOOD PRESSURE: 130 MMHG

## 2021-04-22 DIAGNOSIS — C62.91 CANCER, TESTIS, SEMINOMA, RIGHT (HCC): Primary | ICD-10-CM

## 2021-04-22 PROCEDURE — 96417 CHEMO IV INFUS EACH ADDL SEQ: CPT

## 2021-04-22 PROCEDURE — 96375 TX/PRO/DX INJ NEW DRUG ADDON: CPT

## 2021-04-22 PROCEDURE — 96366 THER/PROPH/DIAG IV INF ADDON: CPT

## 2021-04-22 PROCEDURE — 96413 CHEMO IV INFUSION 1 HR: CPT

## 2021-04-22 PROCEDURE — 96367 TX/PROPH/DG ADDL SEQ IV INF: CPT

## 2021-04-22 NOTE — PROGRESS NOTES
Pt here for C4D4 Cisplatin/Etoposide.   Arrives Ambulating independently, accompanied by Self           Pregnancy screening: Not applicable    Modifications in dose or schedule: No     Frequency of blood return and site check throughout administration: Sherin

## 2021-04-23 ENCOUNTER — OFFICE VISIT (OUTPATIENT)
Dept: HEMATOLOGY/ONCOLOGY | Facility: HOSPITAL | Age: 41
End: 2021-04-23
Attending: INTERNAL MEDICINE
Payer: COMMERCIAL

## 2021-04-23 VITALS
TEMPERATURE: 98 F | BODY MASS INDEX: 31 KG/M2 | OXYGEN SATURATION: 95 % | SYSTOLIC BLOOD PRESSURE: 133 MMHG | WEIGHT: 217 LBS | DIASTOLIC BLOOD PRESSURE: 79 MMHG | RESPIRATION RATE: 18 BRPM | HEART RATE: 95 BPM

## 2021-04-23 DIAGNOSIS — C62.91 CANCER, TESTIS, SEMINOMA, RIGHT (HCC): Primary | ICD-10-CM

## 2021-04-23 PROBLEM — D64.81 ANEMIA ASSOCIATED WITH CHEMOTHERAPY: Status: ACTIVE | Noted: 2021-04-23

## 2021-04-23 PROBLEM — B35.6 TINEA CRURIS: Status: ACTIVE | Noted: 2021-04-23

## 2021-04-23 PROBLEM — E87.6 HYPOKALEMIA: Status: ACTIVE | Noted: 2021-04-23

## 2021-04-23 PROBLEM — T45.1X5A ANEMIA ASSOCIATED WITH CHEMOTHERAPY: Status: ACTIVE | Noted: 2021-04-23

## 2021-04-23 PROCEDURE — 85025 COMPLETE CBC W/AUTO DIFF WBC: CPT

## 2021-04-23 PROCEDURE — 96375 TX/PRO/DX INJ NEW DRUG ADDON: CPT

## 2021-04-23 PROCEDURE — 80048 BASIC METABOLIC PNL TOTAL CA: CPT

## 2021-04-23 PROCEDURE — 86900 BLOOD TYPING SEROLOGIC ABO: CPT

## 2021-04-23 PROCEDURE — 96366 THER/PROPH/DIAG IV INF ADDON: CPT

## 2021-04-23 PROCEDURE — 96367 TX/PROPH/DG ADDL SEQ IV INF: CPT

## 2021-04-23 PROCEDURE — 86901 BLOOD TYPING SEROLOGIC RH(D): CPT

## 2021-04-23 PROCEDURE — 96413 CHEMO IV INFUSION 1 HR: CPT

## 2021-04-23 PROCEDURE — 96417 CHEMO IV INFUS EACH ADDL SEQ: CPT

## 2021-04-23 PROCEDURE — 86850 RBC ANTIBODY SCREEN: CPT

## 2021-04-23 NOTE — PROGRESS NOTES
Pt here for C4D5 Cisplat/Etop.   Arrives Ambulating independently, accompanied by Self           Pregnancy screening: Not applicable    Modifications in dose or schedule: No     Frequency of blood return and site check throughout administration: Prior to ad

## 2021-04-24 ENCOUNTER — OFFICE VISIT (OUTPATIENT)
Dept: HEMATOLOGY/ONCOLOGY | Facility: HOSPITAL | Age: 41
End: 2021-04-24
Attending: INTERNAL MEDICINE
Payer: COMMERCIAL

## 2021-04-24 DIAGNOSIS — C62.91 CANCER, TESTIS, SEMINOMA, RIGHT (HCC): Primary | ICD-10-CM

## 2021-04-24 PROCEDURE — 96372 THER/PROPH/DIAG INJ SC/IM: CPT

## 2021-04-26 ENCOUNTER — OFFICE VISIT (OUTPATIENT)
Dept: HEMATOLOGY/ONCOLOGY | Facility: HOSPITAL | Age: 41
End: 2021-04-26
Attending: INTERNAL MEDICINE
Payer: COMMERCIAL

## 2021-04-26 ENCOUNTER — TELEPHONE (OUTPATIENT)
Dept: HEMATOLOGY/ONCOLOGY | Facility: HOSPITAL | Age: 41
End: 2021-04-26

## 2021-04-26 VITALS
HEART RATE: 89 BPM | RESPIRATION RATE: 18 BRPM | HEIGHT: 70.39 IN | DIASTOLIC BLOOD PRESSURE: 75 MMHG | WEIGHT: 207.63 LBS | SYSTOLIC BLOOD PRESSURE: 116 MMHG | BODY MASS INDEX: 29.39 KG/M2 | TEMPERATURE: 97 F | OXYGEN SATURATION: 98 %

## 2021-04-26 DIAGNOSIS — T45.1X5A CHEMOTHERAPY-INDUCED FATIGUE: ICD-10-CM

## 2021-04-26 DIAGNOSIS — T45.1X5A ANEMIA ASSOCIATED WITH CHEMOTHERAPY: Primary | ICD-10-CM

## 2021-04-26 DIAGNOSIS — R11.2 CINV (CHEMOTHERAPY-INDUCED NAUSEA AND VOMITING): ICD-10-CM

## 2021-04-26 DIAGNOSIS — R53.83 CHEMOTHERAPY-INDUCED FATIGUE: ICD-10-CM

## 2021-04-26 DIAGNOSIS — D64.81 ANEMIA ASSOCIATED WITH CHEMOTHERAPY: Primary | ICD-10-CM

## 2021-04-26 DIAGNOSIS — C62.91 CANCER, TESTIS, SEMINOMA, RIGHT (HCC): ICD-10-CM

## 2021-04-26 DIAGNOSIS — R06.00 DYSPNEA ON EXERTION: ICD-10-CM

## 2021-04-26 DIAGNOSIS — T45.1X5A CINV (CHEMOTHERAPY-INDUCED NAUSEA AND VOMITING): ICD-10-CM

## 2021-04-26 PROCEDURE — 86901 BLOOD TYPING SEROLOGIC RH(D): CPT

## 2021-04-26 PROCEDURE — 96374 THER/PROPH/DIAG INJ IV PUSH: CPT

## 2021-04-26 PROCEDURE — 86920 COMPATIBILITY TEST SPIN: CPT

## 2021-04-26 PROCEDURE — 83735 ASSAY OF MAGNESIUM: CPT

## 2021-04-26 PROCEDURE — 85025 COMPLETE CBC W/AUTO DIFF WBC: CPT

## 2021-04-26 PROCEDURE — 86850 RBC ANTIBODY SCREEN: CPT

## 2021-04-26 PROCEDURE — 80048 BASIC METABOLIC PNL TOTAL CA: CPT

## 2021-04-26 PROCEDURE — 36430 TRANSFUSION BLD/BLD COMPNT: CPT

## 2021-04-26 PROCEDURE — 85027 COMPLETE CBC AUTOMATED: CPT

## 2021-04-26 PROCEDURE — 86900 BLOOD TYPING SEROLOGIC ABO: CPT

## 2021-04-26 PROCEDURE — 96361 HYDRATE IV INFUSION ADD-ON: CPT

## 2021-04-26 PROCEDURE — 85007 BL SMEAR W/DIFF WBC COUNT: CPT

## 2021-04-26 PROCEDURE — 99215 OFFICE O/P EST HI 40 MIN: CPT | Performed by: CLINICAL NURSE SPECIALIST

## 2021-04-26 RX ORDER — SODIUM CHLORIDE 9 MG/ML
INJECTION, SOLUTION INTRAVENOUS ONCE
Status: CANCELLED
Start: 2021-04-26 | End: 2021-04-26

## 2021-04-26 RX ORDER — ACETAMINOPHEN 325 MG/1
650 TABLET ORAL ONCE
Status: CANCELLED | OUTPATIENT
Start: 2021-04-26

## 2021-04-26 RX ORDER — DIPHENHYDRAMINE HCL 25 MG
25 CAPSULE ORAL ONCE
Status: CANCELLED | OUTPATIENT
Start: 2021-04-26

## 2021-04-26 RX ORDER — METOCLOPRAMIDE HYDROCHLORIDE 5 MG/ML
10 INJECTION INTRAMUSCULAR; INTRAVENOUS ONCE
Status: CANCELLED
Start: 2021-04-26 | End: 2021-04-26

## 2021-04-26 RX ORDER — SODIUM CHLORIDE 9 MG/ML
INJECTION, SOLUTION INTRAVENOUS ONCE
Status: COMPLETED | OUTPATIENT
Start: 2021-04-26 | End: 2021-04-26

## 2021-04-26 RX ORDER — DIPHENHYDRAMINE HCL 25 MG
25 CAPSULE ORAL ONCE
Status: COMPLETED | OUTPATIENT
Start: 2021-04-26 | End: 2021-04-26

## 2021-04-26 RX ORDER — SODIUM CHLORIDE 0.9 % (FLUSH) 0.9 %
10 SYRINGE (ML) INJECTION ONCE
Status: CANCELLED | OUTPATIENT
Start: 2021-04-26

## 2021-04-26 RX ORDER — ACETAMINOPHEN 325 MG/1
650 TABLET ORAL ONCE
Status: COMPLETED | OUTPATIENT
Start: 2021-04-26 | End: 2021-04-26

## 2021-04-26 RX ORDER — METOCLOPRAMIDE HYDROCHLORIDE 5 MG/ML
10 INJECTION INTRAMUSCULAR; INTRAVENOUS ONCE
Status: COMPLETED | OUTPATIENT
Start: 2021-04-26 | End: 2021-04-26

## 2021-04-26 RX ADMIN — DIPHENHYDRAMINE HCL 25 MG: 25 MG CAPSULE ORAL at 13:55:00

## 2021-04-26 RX ADMIN — ACETAMINOPHEN 650 MG: 325 TABLET ORAL at 13:55:00

## 2021-04-26 RX ADMIN — SODIUM CHLORIDE: 9 INJECTION, SOLUTION INTRAVENOUS at 12:27:00

## 2021-04-26 RX ADMIN — METOCLOPRAMIDE HYDROCHLORIDE 10 MG: 5 INJECTION INTRAMUSCULAR; INTRAVENOUS at 13:22:00

## 2021-04-26 NOTE — PROGRESS NOTES
Cancer Center Progress Note    Patient Name: Faye Pichardo   YOB: 1980   Medical Record Number: [id number]   CSN: [id number]   Date of visit: 4/26/2021   Provider: SHAUN Urbina  Referring Physician: Kale Salazar Comment:Beans, legumes  Radiology Contrast *    OTHER (SEE COMMENTS)    Comment:Swollen uvula    Vital Signs:      4/26/2021 1140   Height 1.788 m (5' 10.39\")   Weight 207 lb 9.6 oz   BSA (Calculated - sq m) 2.13 sq meters   /77   Pulse 124 (A)   BP 90 tablet, Rfl: 3  •  Fluticasone Propionate 50 MCG/ACT Nasal Suspension, 2 sprays by Nasal route daily. , Disp: 3 Bottle, Rfl: 3  •  acetaminophen 500 MG Oral Tab, Take 1,000 mg by mouth one time. , Disp: , Rfl:   •  ONE-A-DAY MENS OR, Take 1 tablet by mout Range: 39.0 - 53.0 % 24.2 (L)    Platelet Count Latest Ref Range: 150.0 - 450.0 10(3)uL 200.0    RBC Latest Ref Range: 4.30 - 5.70 x10(6)uL 2.63 (L)    MCH Latest Ref Range: 26.0 - 34.0 pg 30.8    MCHC Latest Ref Range: 31.0 - 37.0 g/dL 33.5    MCV Latest

## 2021-04-26 NOTE — PROGRESS NOTES
Education Record    Learner:  Patient    Disease / Diagnosis: Testicular CA - IVF and PRBC transfusion    Barriers / Limitations:  None   Comments:    Method:  Brief focused and Reinforcement   Comments:    General Topics:  Plan of care reviewed   Comments

## 2021-04-26 NOTE — TELEPHONE ENCOUNTER
Toxicities: C4D5 Etoposide/Cisplatin on 4/23/2021  Pegfilgrastim on 4/24/2021     Fatigue/Dyspnea/Anorexia/Nausea/Dehydration/Constipation      Fatigue: Grade 2 (All weekend Lillian Diamond has felt tired. He denies dyspnea at rest. He is having dyspnea when he walks up stairs. He denies chest pain or chest pressure. No pain when he takes a deep breath.)  Anorexia: Grade 2 (Has not been able to eat much due to ongoing nausea.)  Nausea: Grade 2 (He has been taking Olanzapine, zofran and compazine and is still having constant nausea. No emesis. Took zofran at [facility] today. He was in the bathroom this morning and felt nauseated. He had the cold sweats. Temperature 98.6.)  Dehydration: Grade 2 (Patient has not been able to drink very much due to constant nausea. This weekend through to today he has been feeling lightheaded and dizzy when he stands up.)  Constipation: Grade 1 (He has been constipated. He took Miralax on Saturday and had a small, brown bowel movement. No BM yesterday or today.)    Patient agreed to an acute care appt with Paramjit Charles at 11:30 am today. I called Megan Kline and updated her that he will need labs and probable hydration.

## 2021-04-28 ENCOUNTER — OFFICE VISIT (OUTPATIENT)
Dept: HEMATOLOGY/ONCOLOGY | Facility: HOSPITAL | Age: 41
End: 2021-04-28
Attending: INTERNAL MEDICINE
Payer: COMMERCIAL

## 2021-04-28 VITALS
HEART RATE: 106 BPM | OXYGEN SATURATION: 98 % | RESPIRATION RATE: 16 BRPM | TEMPERATURE: 96 F | SYSTOLIC BLOOD PRESSURE: 125 MMHG | DIASTOLIC BLOOD PRESSURE: 85 MMHG

## 2021-04-28 DIAGNOSIS — D64.81 ANEMIA ASSOCIATED WITH CHEMOTHERAPY: Primary | ICD-10-CM

## 2021-04-28 DIAGNOSIS — C62.91 CANCER, TESTIS, SEMINOMA, RIGHT (HCC): ICD-10-CM

## 2021-04-28 DIAGNOSIS — T45.1X5A ANEMIA ASSOCIATED WITH CHEMOTHERAPY: Primary | ICD-10-CM

## 2021-04-28 PROCEDURE — 96374 THER/PROPH/DIAG INJ IV PUSH: CPT

## 2021-04-28 PROCEDURE — 85025 COMPLETE CBC W/AUTO DIFF WBC: CPT

## 2021-04-28 PROCEDURE — 96361 HYDRATE IV INFUSION ADD-ON: CPT

## 2021-04-28 PROCEDURE — 86850 RBC ANTIBODY SCREEN: CPT

## 2021-04-28 PROCEDURE — 86900 BLOOD TYPING SEROLOGIC ABO: CPT

## 2021-04-28 PROCEDURE — 80053 COMPREHEN METABOLIC PANEL: CPT | Performed by: CLINICAL NURSE SPECIALIST

## 2021-04-28 PROCEDURE — 83735 ASSAY OF MAGNESIUM: CPT | Performed by: CLINICAL NURSE SPECIALIST

## 2021-04-28 PROCEDURE — 86901 BLOOD TYPING SEROLOGIC RH(D): CPT

## 2021-04-28 RX ORDER — SODIUM CHLORIDE 0.9 % (FLUSH) 0.9 %
10 SYRINGE (ML) INJECTION ONCE
Status: CANCELLED | OUTPATIENT
Start: 2021-04-28

## 2021-04-28 RX ORDER — SODIUM CHLORIDE 9 MG/ML
INJECTION, SOLUTION INTRAVENOUS ONCE
Status: COMPLETED | OUTPATIENT
Start: 2021-04-28 | End: 2021-04-28

## 2021-04-28 RX ORDER — SODIUM CHLORIDE 9 MG/ML
INJECTION, SOLUTION INTRAVENOUS ONCE
Status: CANCELLED
Start: 2021-04-28 | End: 2021-04-28

## 2021-04-28 RX ADMIN — SODIUM CHLORIDE: 9 INJECTION, SOLUTION INTRAVENOUS at 11:08:00

## 2021-04-28 NOTE — PROGRESS NOTES
Education Record    Learner:  Patient    Disease / Bonnye Flatter and possible transfusion    Barriers / Limitations:  None   Comments:    Method:  Discussion   Comments:    General Topics:  Plan of care reviewed   Comments:    Outcome:  Shows understandin

## 2021-05-05 ENCOUNTER — OFFICE VISIT (OUTPATIENT)
Dept: HEMATOLOGY/ONCOLOGY | Facility: HOSPITAL | Age: 41
End: 2021-05-05
Attending: INTERNAL MEDICINE
Payer: COMMERCIAL

## 2021-05-05 VITALS
HEART RATE: 88 BPM | OXYGEN SATURATION: 99 % | TEMPERATURE: 99 F | SYSTOLIC BLOOD PRESSURE: 120 MMHG | DIASTOLIC BLOOD PRESSURE: 78 MMHG | RESPIRATION RATE: 18 BRPM

## 2021-05-05 DIAGNOSIS — D64.81 ANEMIA ASSOCIATED WITH CHEMOTHERAPY: Primary | ICD-10-CM

## 2021-05-05 DIAGNOSIS — C62.91 MALIGNANT NEOPLASM OF RIGHT TESTIS, UNSPECIFIED WHETHER DESCENDED OR UNDESCENDED (HCC): ICD-10-CM

## 2021-05-05 DIAGNOSIS — T45.1X5A ANEMIA ASSOCIATED WITH CHEMOTHERAPY: Primary | ICD-10-CM

## 2021-05-05 DIAGNOSIS — C62.91 CANCER, TESTIS, SEMINOMA, RIGHT (HCC): ICD-10-CM

## 2021-05-05 PROCEDURE — 86901 BLOOD TYPING SEROLOGIC RH(D): CPT

## 2021-05-05 PROCEDURE — 86920 COMPATIBILITY TEST SPIN: CPT

## 2021-05-05 PROCEDURE — 85007 BL SMEAR W/DIFF WBC COUNT: CPT

## 2021-05-05 PROCEDURE — 85027 COMPLETE CBC AUTOMATED: CPT

## 2021-05-05 PROCEDURE — 86900 BLOOD TYPING SEROLOGIC ABO: CPT

## 2021-05-05 PROCEDURE — 85025 COMPLETE CBC W/AUTO DIFF WBC: CPT

## 2021-05-05 PROCEDURE — 86850 RBC ANTIBODY SCREEN: CPT

## 2021-05-05 PROCEDURE — 36430 TRANSFUSION BLD/BLD COMPNT: CPT

## 2021-05-05 RX ORDER — ACETAMINOPHEN 325 MG/1
650 TABLET ORAL ONCE
Status: COMPLETED | OUTPATIENT
Start: 2021-05-05 | End: 2021-05-05

## 2021-05-05 RX ORDER — ACETAMINOPHEN 325 MG/1
650 TABLET ORAL ONCE
Status: CANCELLED | OUTPATIENT
Start: 2021-05-05

## 2021-05-05 RX ORDER — DIPHENHYDRAMINE HCL 25 MG
25 CAPSULE ORAL ONCE
Status: COMPLETED | OUTPATIENT
Start: 2021-05-05 | End: 2021-05-05

## 2021-05-05 RX ORDER — DIPHENHYDRAMINE HCL 25 MG
25 CAPSULE ORAL ONCE
Status: CANCELLED | OUTPATIENT
Start: 2021-05-05

## 2021-05-05 RX ADMIN — DIPHENHYDRAMINE HCL 25 MG: 25 MG CAPSULE ORAL at 15:24:00

## 2021-05-05 RX ADMIN — ACETAMINOPHEN 650 MG: 325 TABLET ORAL at 15:24:00

## 2021-05-05 NOTE — PROGRESS NOTES
Pt here for blood transfusion.   Arrives Ambulating independently, accompanied by Self           Pregnancy screening: Not applicable    Modifications in dose or schedule: No     Frequency of blood return and site check throughout administration: Prior to ad

## 2021-05-07 ENCOUNTER — HOSPITAL ENCOUNTER (OUTPATIENT)
Dept: CT IMAGING | Facility: HOSPITAL | Age: 41
Discharge: HOME | End: 2021-05-07
Attending: INTERNAL MEDICINE
Payer: COMMERCIAL

## 2021-05-07 DIAGNOSIS — C62.91 CANCER, TESTIS, SEMINOMA, RIGHT (HCC): ICD-10-CM

## 2021-05-07 PROCEDURE — 74176 CT ABD & PELVIS W/O CONTRAST: CPT | Performed by: INTERNAL MEDICINE

## 2021-05-07 PROCEDURE — 71250 CT THORAX DX C-: CPT | Performed by: INTERNAL MEDICINE

## 2021-05-10 ENCOUNTER — NURSE ONLY (OUTPATIENT)
Dept: HEMATOLOGY/ONCOLOGY | Facility: HOSPITAL | Age: 41
End: 2021-05-10
Attending: INTERNAL MEDICINE
Payer: COMMERCIAL

## 2021-05-10 VITALS
SYSTOLIC BLOOD PRESSURE: 128 MMHG | HEART RATE: 108 BPM | BODY MASS INDEX: 30 KG/M2 | DIASTOLIC BLOOD PRESSURE: 83 MMHG | WEIGHT: 213.38 LBS | OXYGEN SATURATION: 99 % | TEMPERATURE: 98 F

## 2021-05-10 DIAGNOSIS — C78.00 MALIGNANT NEOPLASM METASTATIC TO LUNG, UNSPECIFIED LATERALITY (HCC): ICD-10-CM

## 2021-05-10 DIAGNOSIS — D64.81 ANEMIA ASSOCIATED WITH CHEMOTHERAPY: ICD-10-CM

## 2021-05-10 DIAGNOSIS — R11.2 CINV (CHEMOTHERAPY-INDUCED NAUSEA AND VOMITING): ICD-10-CM

## 2021-05-10 DIAGNOSIS — C62.91 CANCER, TESTIS, SEMINOMA, RIGHT (HCC): ICD-10-CM

## 2021-05-10 DIAGNOSIS — T45.1X5A ANEMIA ASSOCIATED WITH CHEMOTHERAPY: ICD-10-CM

## 2021-05-10 DIAGNOSIS — C62.91 CANCER, TESTIS, SEMINOMA, RIGHT (HCC): Primary | ICD-10-CM

## 2021-05-10 DIAGNOSIS — T45.1X5A CINV (CHEMOTHERAPY-INDUCED NAUSEA AND VOMITING): ICD-10-CM

## 2021-05-10 DIAGNOSIS — K21.9 GASTROESOPHAGEAL REFLUX DISEASE, UNSPECIFIED WHETHER ESOPHAGITIS PRESENT: ICD-10-CM

## 2021-05-10 DIAGNOSIS — Z95.828 PORT-A-CATH IN PLACE: ICD-10-CM

## 2021-05-10 PROCEDURE — 85007 BL SMEAR W/DIFF WBC COUNT: CPT

## 2021-05-10 PROCEDURE — 82105 ALPHA-FETOPROTEIN SERUM: CPT

## 2021-05-10 PROCEDURE — 83615 LACTATE (LD) (LDH) ENZYME: CPT

## 2021-05-10 PROCEDURE — 36591 DRAW BLOOD OFF VENOUS DEVICE: CPT

## 2021-05-10 PROCEDURE — 84704 HCG FREE BETACHAIN TEST: CPT

## 2021-05-10 PROCEDURE — 99215 OFFICE O/P EST HI 40 MIN: CPT | Performed by: INTERNAL MEDICINE

## 2021-05-10 PROCEDURE — 85027 COMPLETE CBC AUTOMATED: CPT

## 2021-05-10 PROCEDURE — 85025 COMPLETE CBC W/AUTO DIFF WBC: CPT

## 2021-05-10 PROCEDURE — 80053 COMPREHEN METABOLIC PANEL: CPT

## 2021-05-10 NOTE — PROGRESS NOTES
Hematology/Oncology Clinic Follow Up Visit    Patient Name: Lisa Merrill  Medical Record Number: [id number]   YOB: 1980    PCP: Dr. Felicity Mcnulty  Other providers: Dr. Jenna Yañez (urology)    Reason for Consultation:  Alma Rosa Burris changes. Near complete resolution of tiny and small pulmonary nodules. Retroperitoneal adenopathy has improved- a few scattered tiny lymph nodes are present, none are enlarged by size criteria. Stable mild splenomegaly.   No new sites of metastatic disease tablet, Rfl: 1  Prochlorperazine Maleate (COMPAZINE) 10 mg tablet, Take 1 tablet (10 mg total) by mouth every 6 (six) hours as needed for Nausea., Disp: 30 tablet, Rfl: 3  Ondansetron HCl (ZOFRAN) 8 MG tablet, Take 1 tablet (8 mg total) by mouth every 8 (e pertinent positives and negative per the HPI    Vital Signs:  Height: --  Weight: 96.8 kg (213 lb 6.4 oz) (05/10 0905)  BSA (Calculated - sq m): --  Pulse: 108 (05/10 0905)  BP: 128/83 (05/10 0905)  Temp: 97.6 °F (36.4 °C) (05/10 0905)  Do Not Use - Resp R 6.7 05/10/2021    ALB 3.6 05/10/2021    GLOBULIN 3.1 05/10/2021     05/10/2021    K 4.3 05/10/2021     05/10/2021    CO2 24.0 05/10/2021     Lab Results   Component Value Date    INR 1.0 02/12/2021     Lab Results   Component Value Date    LDH his port removed- order placed    *Anemia due to chemotherapy  -Hemoglobin improving. Anticipate will continue to improve now that is done with chemotherapy. -repeat CBC in 2 months    *CINV, GERD  -improved.   Can stop olanzapine at bedtime at this poin

## 2021-05-16 ENCOUNTER — LAB ENCOUNTER (OUTPATIENT)
Dept: LAB | Facility: HOSPITAL | Age: 41
End: 2021-05-16
Attending: INTERNAL MEDICINE
Payer: COMMERCIAL

## 2021-05-16 DIAGNOSIS — C62.90 TESTICULAR CANCER (HCC): ICD-10-CM

## 2021-05-19 ENCOUNTER — HOSPITAL ENCOUNTER (OUTPATIENT)
Dept: INTERVENTIONAL RADIOLOGY/VASCULAR | Facility: HOSPITAL | Age: 41
Discharge: HOME | End: 2021-05-19
Attending: INTERNAL MEDICINE | Admitting: INTERNAL MEDICINE
Payer: COMMERCIAL

## 2021-05-19 VITALS
OXYGEN SATURATION: 98 % | SYSTOLIC BLOOD PRESSURE: 117 MMHG | HEART RATE: 90 BPM | DIASTOLIC BLOOD PRESSURE: 81 MMHG | TEMPERATURE: 98 F | RESPIRATION RATE: 16 BRPM

## 2021-05-19 DIAGNOSIS — C62.90 TESTICULAR CANCER (HCC): Primary | ICD-10-CM

## 2021-05-19 DIAGNOSIS — Z95.828 PORT-A-CATH IN PLACE: ICD-10-CM

## 2021-05-19 PROCEDURE — 36590 REMOVAL TUNNELED CV CATH: CPT

## 2021-05-19 PROCEDURE — 0JPT0WZ REMOVAL OF TOTALLY IMPLANTABLE VASCULAR ACCESS DEVICE FROM TRUNK SUBCUTANEOUS TISSUE AND FASCIA, OPEN APPROACH: ICD-10-PCS | Performed by: INTERNAL MEDICINE

## 2021-05-19 PROCEDURE — 77001 FLUOROGUIDE FOR VEIN DEVICE: CPT

## 2021-05-19 RX ORDER — LIDOCAINE HYDROCHLORIDE AND EPINEPHRINE 10; 10 MG/ML; UG/ML
INJECTION, SOLUTION INFILTRATION; PERINEURAL
Status: COMPLETED
Start: 2021-05-19 | End: 2021-05-19

## 2021-05-19 RX ORDER — CEFAZOLIN SODIUM 1 G/3ML
INJECTION, POWDER, FOR SOLUTION INTRAMUSCULAR; INTRAVENOUS
Status: COMPLETED
Start: 2021-05-19 | End: 2021-05-19

## 2021-05-19 RX ORDER — SODIUM CHLORIDE 9 MG/ML
INJECTION, SOLUTION INTRAVENOUS CONTINUOUS
Status: DISCONTINUED | OUTPATIENT
Start: 2021-05-19 | End: 2021-05-19

## 2021-05-19 RX ORDER — MIDAZOLAM HYDROCHLORIDE 1 MG/ML
INJECTION INTRAMUSCULAR; INTRAVENOUS
Status: COMPLETED
Start: 2021-05-19 | End: 2021-05-19

## 2021-05-19 RX ORDER — LIDOCAINE HYDROCHLORIDE 10 MG/ML
INJECTION, SOLUTION INFILTRATION; PERINEURAL
Status: DISCONTINUED
Start: 2021-05-19 | End: 2021-05-19 | Stop reason: WASHOUT

## 2021-05-19 NOTE — PROGRESS NOTES
Pt s/p port removal in IR. Pt recovered in holding. Right chest with padded tegaderm. Dressing CDI, no bleeding or hematoma. Pt ate and drank. Discharge instructions reviewed with pt and pt father. Copy given. After recovery, IV removed.  Pt dressed and was

## 2021-05-19 NOTE — PROCEDURES
[facility]  Procedure Note    Valarie Faster  Patient Status:  Outpatient in a Bed    1980 MRN [id number]   Location [facility] [address] [facility] Attending Franc Camarillo MD   Hosp Day # 0 PCP Chilo Newton MD     Procedure

## 2021-05-19 NOTE — H&P
[address] Patient Status:  Outpatient in a Bed    1980 MRN [id number]   Location [address] [facility] Attending Steven Gonzales MD   Hosp Day # 0 PCP MD Erna Carty HGB, HCT, MCV, MCH, MCHC, RDW, NEPRELIM, WBC, PLT in the last 168 hours. Recent Labs   Lab 05/19/21  0733   INR 1.0     No results for input(s): GLU, BUN, CREATSERUM, GFRAA, GFRNAA, CA, NA, K, CL, CO2 in the last 168 hours.     Assessment/Plan:   Shekhario

## 2021-05-20 ENCOUNTER — OFFICE VISIT (OUTPATIENT)
Dept: HEMATOLOGY/ONCOLOGY | Facility: HOSPITAL | Age: 41
End: 2021-05-20
Attending: INTERNAL MEDICINE
Payer: COMMERCIAL

## 2021-05-20 DIAGNOSIS — Z08 ENCOUNTER FOR FOLLOW-UP EXAMINATION AFTER COMPLETED TREATMENT FOR MALIGNANT NEOPLASM: ICD-10-CM

## 2021-05-20 DIAGNOSIS — C62.91 CANCER, TESTIS, SEMINOMA, RIGHT (HCC): Primary | ICD-10-CM

## 2021-05-20 DIAGNOSIS — Z71.9 COUNSELING, UNSPECIFIED: ICD-10-CM

## 2021-05-20 PROCEDURE — 99215 OFFICE O/P EST HI 40 MIN: CPT | Performed by: NURSE PRACTITIONER

## 2021-05-20 RX ORDER — PANTOPRAZOLE SODIUM 40 MG/1
40 TABLET, DELAYED RELEASE ORAL
COMMUNITY
End: 2021-09-02

## 2021-05-20 NOTE — PROGRESS NOTES
I met with Asael De Guzman and his father for a Survivorship Clinic visit to provide a survivorship care plan (SCP) and information related to post-treatment care. Asael De Guzman has a diagnosis of Stage IIIA testicular cancer.   He was treated with 4 cycles of chemotherapy t was received. Reviewed common cancer survivor issues and resources available. He denies any current issues. Various survivorship resources were provided to use going forward as needed (see below).      Reviewed Lifestyle/behaviors that can affect on who recommended Pantoprazole BID with need for evaluation by [facility]. The patient was informed via MyChart and provided contact information. He was also instructed to proceed with COVID-19 vaccine.     SHAUN Rodriguez

## 2021-05-25 ENCOUNTER — IMMUNIZATION (OUTPATIENT)
Dept: LAB | Facility: HOSPITAL | Age: 41
End: 2021-05-25
Attending: HOSPITALIST
Payer: COMMERCIAL

## 2021-05-25 DIAGNOSIS — Z23 NEED FOR VACCINATION: Primary | ICD-10-CM

## 2021-05-25 PROCEDURE — 0001A SARSCOV2 VAC 30MCG/0.3ML IM: CPT

## 2021-06-15 ENCOUNTER — IMMUNIZATION (OUTPATIENT)
Dept: LAB | Facility: HOSPITAL | Age: 41
End: 2021-06-15
Attending: EMERGENCY MEDICINE
Payer: COMMERCIAL

## 2021-06-15 DIAGNOSIS — Z23 NEED FOR VACCINATION: Primary | ICD-10-CM

## 2021-06-15 PROCEDURE — 0002A SARSCOV2 VAC 30MCG/0.3ML IM: CPT

## 2021-07-10 ENCOUNTER — HOSPITAL ENCOUNTER (OUTPATIENT)
Dept: CT IMAGING | Facility: HOSPITAL | Age: 41
Discharge: HOME | End: 2021-07-10
Attending: INTERNAL MEDICINE
Payer: COMMERCIAL

## 2021-07-10 DIAGNOSIS — C62.91 CANCER, TESTIS, SEMINOMA, RIGHT (HCC): ICD-10-CM

## 2021-07-10 DIAGNOSIS — C78.00 MALIGNANT NEOPLASM METASTATIC TO LUNG, UNSPECIFIED LATERALITY (HCC): ICD-10-CM

## 2021-07-10 PROCEDURE — 71250 CT THORAX DX C-: CPT | Performed by: INTERNAL MEDICINE

## 2021-07-14 ENCOUNTER — OFFICE VISIT (OUTPATIENT)
Dept: HEMATOLOGY/ONCOLOGY | Facility: HOSPITAL | Age: 41
End: 2021-07-14
Attending: INTERNAL MEDICINE
Payer: COMMERCIAL

## 2021-07-14 VITALS
WEIGHT: 220 LBS | RESPIRATION RATE: 18 BRPM | BODY MASS INDEX: 31 KG/M2 | OXYGEN SATURATION: 98 % | DIASTOLIC BLOOD PRESSURE: 78 MMHG | HEART RATE: 99 BPM | TEMPERATURE: 97 F | SYSTOLIC BLOOD PRESSURE: 118 MMHG

## 2021-07-14 DIAGNOSIS — K21.9 GASTROESOPHAGEAL REFLUX DISEASE, UNSPECIFIED WHETHER ESOPHAGITIS PRESENT: ICD-10-CM

## 2021-07-14 DIAGNOSIS — T45.1X5A CHEMOTHERAPY-INDUCED PERIPHERAL NEUROPATHY (HCC): ICD-10-CM

## 2021-07-14 DIAGNOSIS — H93.13 TINNITUS OF BOTH EARS: ICD-10-CM

## 2021-07-14 DIAGNOSIS — R91.8 PULMONARY NODULES: ICD-10-CM

## 2021-07-14 DIAGNOSIS — C62.91 CANCER, TESTIS, SEMINOMA, RIGHT (HCC): Primary | ICD-10-CM

## 2021-07-14 DIAGNOSIS — T45.1X5A ANEMIA ASSOCIATED WITH CHEMOTHERAPY: ICD-10-CM

## 2021-07-14 DIAGNOSIS — D64.81 ANEMIA ASSOCIATED WITH CHEMOTHERAPY: ICD-10-CM

## 2021-07-14 DIAGNOSIS — G62.0 CHEMOTHERAPY-INDUCED PERIPHERAL NEUROPATHY (HCC): ICD-10-CM

## 2021-07-14 LAB
AFP-TM SERPL-MCNC: 1.9 NG/ML (ref ?–8)
ALBUMIN SERPL-MCNC: 4.1 G/DL (ref 3.4–5)
ALBUMIN/GLOB SERPL: 1.4 {RATIO} (ref 1–2)
ALP LIVER SERPL-CCNC: 62 U/L
ALT SERPL-CCNC: 67 U/L
ANION GAP SERPL CALC-SCNC: 6 MMOL/L (ref 0–18)
AST SERPL-CCNC: 30 U/L (ref 15–37)
BASOPHILS # BLD AUTO: 0.02 X10(3) UL (ref 0–0.2)
BASOPHILS NFR BLD AUTO: 0.3 %
BILIRUB SERPL-MCNC: 0.3 MG/DL (ref 0.1–2)
BUN BLD-MCNC: 16 MG/DL (ref 7–18)
BUN/CREAT SERPL: 14.7 (ref 10–20)
CALCIUM BLD-MCNC: 8.8 MG/DL (ref 8.5–10.1)
CHLORIDE SERPL-SCNC: 108 MMOL/L (ref 98–112)
CO2 SERPL-SCNC: 25 MMOL/L (ref 21–32)
CREAT BLD-MCNC: 1.09 MG/DL
DEPRECATED RDW RBC AUTO: 40.6 FL (ref 35.1–46.3)
EOSINOPHIL # BLD AUTO: 0.09 X10(3) UL (ref 0–0.7)
EOSINOPHIL NFR BLD AUTO: 1.4 %
ERYTHROCYTE [DISTWIDTH] IN BLOOD BY AUTOMATED COUNT: 12 % (ref 11–15)
GLOBULIN PLAS-MCNC: 2.9 G/DL (ref 2.8–4.4)
GLUCOSE BLD-MCNC: 118 MG/DL (ref 70–99)
HCT VFR BLD AUTO: 36.4 %
HGB BLD-MCNC: 12.8 G/DL
IMM GRANULOCYTES # BLD AUTO: 0.01 X10(3) UL (ref 0–1)
IMM GRANULOCYTES NFR BLD: 0.2 %
LDH SERPL L TO P-CCNC: 169 U/L
LYMPHOCYTES # BLD AUTO: 1.58 X10(3) UL (ref 1–4)
LYMPHOCYTES NFR BLD AUTO: 24.9 %
M PROTEIN MFR SERPL ELPH: 7 G/DL (ref 6.4–8.2)
MCH RBC QN AUTO: 32.2 PG (ref 26–34)
MCHC RBC AUTO-ENTMCNC: 35.2 G/DL (ref 31–37)
MCV RBC AUTO: 91.7 FL
MONOCYTES # BLD AUTO: 0.33 X10(3) UL (ref 0.1–1)
MONOCYTES NFR BLD AUTO: 5.2 %
NEUTROPHILS # BLD AUTO: 4.32 X10 (3) UL (ref 1.5–7.7)
NEUTROPHILS # BLD AUTO: 4.32 X10(3) UL (ref 1.5–7.7)
NEUTROPHILS NFR BLD AUTO: 68 %
OSMOLALITY SERPL CALC.SUM OF ELEC: 290 MOSM/KG (ref 275–295)
PATIENT FASTING Y/N/NP: NO
PLATELET # BLD AUTO: 250 10(3)UL (ref 150–450)
POTASSIUM SERPL-SCNC: 4.1 MMOL/L (ref 3.5–5.1)
RBC # BLD AUTO: 3.97 X10(6)UL
SODIUM SERPL-SCNC: 139 MMOL/L (ref 136–145)
WBC # BLD AUTO: 6.4 X10(3) UL (ref 4–11)

## 2021-07-14 PROCEDURE — 99215 OFFICE O/P EST HI 40 MIN: CPT | Performed by: INTERNAL MEDICINE

## 2021-07-14 NOTE — PROGRESS NOTES
Outpatient Oncology Care Plan  Problem list:  knowledge deficit  Fatigue     Problems related to:    chemotherapy     Interventions:  provided general teaching     Expected outcomes:  understands plan of care     Progress towards outcome:  making progress

## 2021-07-14 NOTE — PROGRESS NOTES
Hematology/Oncology Clinic Follow Up Visit    Patient Name: Killian Obrien  Medical Record Number: [id number]   YOB: 1980    PCP: Dr. Jodee Moseley  Other providers: Dr. Ani Kimball (urology)    Reason for Consultation:  Dedra Gordon changes. Near complete resolution of tiny and small pulmonary nodules. Retroperitoneal adenopathy has improved- a few scattered tiny lymph nodes are present, none are enlarged by size criteria. Stable mild splenomegaly.   No new sites of metastatic disease TABLET BY MOUTH EVERY NIGHT AT BEDTIME (Patient taking differently: Take 10 mg by mouth nightly. TAKE 1 TABLET BY MOUTH EVERY NIGHT AT BEDTIME ), Disp: 90 tablet, Rfl: 3  Fluticasone Propionate 50 MCG/ACT Nasal Suspension, 2 sprays by Nasal route daily. , D (218 lb 3.2 oz)    ECOG PS: 1    Physical Examination:  General: Patient is alert and oriented, not in acute distress  Psych: Mood and affect are appropriate  Eyes: EOMI  ENT: Oropharynx is clear  CV: Regular rate and rhythm, no murmurs, no LE edema  Respi PFTs 2/13/21:  FVC 4.74L (90% pred)    FEV1 4.24L (102% pred)    FEV1/FVC 81%    DLCO 88% pred     Impression & Plan:     *Seminoma of right testis, stage IIIA   -Enlarged retroperitoneal lymph nodes appear to be involved by malignancy, also has multiple testicular cancer on active chemotherapy requiring close monitoring    Jose Davison MD  Hematology/Medical [address] [facility]

## 2021-07-15 PROBLEM — T45.1X5A CHEMOTHERAPY-INDUCED PERIPHERAL NEUROPATHY (HCC): Status: ACTIVE | Noted: 2021-07-15

## 2021-07-15 PROBLEM — G62.0 CHEMOTHERAPY-INDUCED PERIPHERAL NEUROPATHY  (HCC): Status: ACTIVE | Noted: 2021-07-15

## 2021-07-15 PROBLEM — T45.1X5A CHEMOTHERAPY-INDUCED PERIPHERAL NEUROPATHY: Status: ACTIVE | Noted: 2021-07-15

## 2021-07-15 PROBLEM — G62.0 CHEMOTHERAPY-INDUCED PERIPHERAL NEUROPATHY: Status: ACTIVE | Noted: 2021-07-15

## 2021-07-15 PROBLEM — H93.13 TINNITUS OF BOTH EARS: Status: ACTIVE | Noted: 2021-07-15

## 2021-07-15 PROBLEM — G62.0 CHEMOTHERAPY-INDUCED PERIPHERAL NEUROPATHY (HCC): Status: ACTIVE | Noted: 2021-07-15

## 2021-07-15 PROBLEM — B35.6 TINEA CRURIS: Status: RESOLVED | Noted: 2021-04-23 | Resolved: 2021-07-15

## 2021-07-15 PROBLEM — T45.1X5A CHEMOTHERAPY-INDUCED PERIPHERAL NEUROPATHY  (HCC): Status: ACTIVE | Noted: 2021-07-15

## 2021-07-16 LAB — BETA HCG QUANT (TUMOR MARKER): <1 IU/L

## 2021-09-11 ENCOUNTER — HOSPITAL ENCOUNTER (OUTPATIENT)
Dept: CT IMAGING | Facility: HOSPITAL | Age: 41
Discharge: HOME | End: 2021-09-11
Attending: INTERNAL MEDICINE
Payer: COMMERCIAL

## 2021-09-11 DIAGNOSIS — C62.91 CANCER, TESTIS, SEMINOMA, RIGHT (HCC): ICD-10-CM

## 2021-09-11 DIAGNOSIS — R91.8 PULMONARY NODULES: ICD-10-CM

## 2021-09-11 PROCEDURE — 74176 CT ABD & PELVIS W/O CONTRAST: CPT | Performed by: INTERNAL MEDICINE

## 2021-09-11 PROCEDURE — 71250 CT THORAX DX C-: CPT | Performed by: INTERNAL MEDICINE

## 2021-09-15 ENCOUNTER — OFFICE VISIT (OUTPATIENT)
Dept: HEMATOLOGY/ONCOLOGY | Facility: HOSPITAL | Age: 41
End: 2021-09-15
Attending: INTERNAL MEDICINE
Payer: COMMERCIAL

## 2021-09-15 VITALS
WEIGHT: 225 LBS | RESPIRATION RATE: 16 BRPM | OXYGEN SATURATION: 99 % | BODY MASS INDEX: 31.85 KG/M2 | SYSTOLIC BLOOD PRESSURE: 115 MMHG | DIASTOLIC BLOOD PRESSURE: 80 MMHG | HEIGHT: 70.39 IN | TEMPERATURE: 97 F | HEART RATE: 85 BPM

## 2021-09-15 DIAGNOSIS — T45.1X5A ANEMIA ASSOCIATED WITH CHEMOTHERAPY: ICD-10-CM

## 2021-09-15 DIAGNOSIS — D64.81 ANEMIA ASSOCIATED WITH CHEMOTHERAPY: ICD-10-CM

## 2021-09-15 DIAGNOSIS — T45.1X5A CHEMOTHERAPY-INDUCED PERIPHERAL NEUROPATHY (HCC): ICD-10-CM

## 2021-09-15 DIAGNOSIS — C62.91 CANCER, TESTIS, SEMINOMA, RIGHT (HCC): Primary | ICD-10-CM

## 2021-09-15 DIAGNOSIS — G62.0 CHEMOTHERAPY-INDUCED PERIPHERAL NEUROPATHY (HCC): ICD-10-CM

## 2021-09-15 DIAGNOSIS — H93.13 TINNITUS OF BOTH EARS: ICD-10-CM

## 2021-09-15 LAB
AFP-TM SERPL-MCNC: 1.7 NG/ML (ref ?–8)
ALBUMIN SERPL-MCNC: 4.1 G/DL (ref 3.4–5)
ALBUMIN/GLOB SERPL: 1.2 {RATIO} (ref 1–2)
ALP LIVER SERPL-CCNC: 63 U/L
ALT SERPL-CCNC: 37 U/L
ANION GAP SERPL CALC-SCNC: 4 MMOL/L (ref 0–18)
AST SERPL-CCNC: 19 U/L (ref 15–37)
BASOPHILS # BLD AUTO: 0.03 X10(3) UL (ref 0–0.2)
BASOPHILS NFR BLD AUTO: 0.4 %
BILIRUB SERPL-MCNC: 0.3 MG/DL (ref 0.1–2)
BUN BLD-MCNC: 17 MG/DL (ref 7–18)
CALCIUM BLD-MCNC: 9.1 MG/DL (ref 8.5–10.1)
CHLORIDE SERPL-SCNC: 104 MMOL/L (ref 98–112)
CO2 SERPL-SCNC: 29 MMOL/L (ref 21–32)
CREAT BLD-MCNC: 1.05 MG/DL
EOSINOPHIL # BLD AUTO: 0.1 X10(3) UL (ref 0–0.7)
EOSINOPHIL NFR BLD AUTO: 1.5 %
ERYTHROCYTE [DISTWIDTH] IN BLOOD BY AUTOMATED COUNT: 12.5 %
GLOBULIN PLAS-MCNC: 3.4 G/DL (ref 2.8–4.4)
GLUCOSE BLD-MCNC: 83 MG/DL (ref 70–99)
HCT VFR BLD AUTO: 39 %
HGB BLD-MCNC: 13.4 G/DL
IMM GRANULOCYTES # BLD AUTO: 0.02 X10(3) UL (ref 0–1)
IMM GRANULOCYTES NFR BLD: 0.3 %
LDH SERPL L TO P-CCNC: 161 U/L
LYMPHOCYTES # BLD AUTO: 1.92 X10(3) UL (ref 1–4)
LYMPHOCYTES NFR BLD AUTO: 28 %
MCH RBC QN AUTO: 30.5 PG (ref 26–34)
MCHC RBC AUTO-ENTMCNC: 34.4 G/DL (ref 31–37)
MCV RBC AUTO: 88.6 FL
MONOCYTES # BLD AUTO: 0.61 X10(3) UL (ref 0.1–1)
MONOCYTES NFR BLD AUTO: 8.9 %
NEUTROPHILS # BLD AUTO: 4.18 X10 (3) UL (ref 1.5–7.7)
NEUTROPHILS # BLD AUTO: 4.18 X10(3) UL (ref 1.5–7.7)
NEUTROPHILS NFR BLD AUTO: 60.9 %
OSMOLALITY SERPL CALC.SUM OF ELEC: 285 MOSM/KG (ref 275–295)
PLATELET # BLD AUTO: 292 10(3)UL (ref 150–450)
POTASSIUM SERPL-SCNC: 4.1 MMOL/L (ref 3.5–5.1)
PROT SERPL-MCNC: 7.5 G/DL (ref 6.4–8.2)
RBC # BLD AUTO: 4.4 X10(6)UL
SODIUM SERPL-SCNC: 137 MMOL/L (ref 136–145)
WBC # BLD AUTO: 6.9 X10(3) UL (ref 4–11)

## 2021-09-15 PROCEDURE — 99215 OFFICE O/P EST HI 40 MIN: CPT | Performed by: INTERNAL MEDICINE

## 2021-09-15 NOTE — PROGRESS NOTES
Hematology/Oncology Clinic Follow Up Visit    Patient Name: Jose Dorman  Medical Record Number: [id number]   YOB: 1980    PCP: Dr. Nelida Hidalgo  Other providers: Dr. Shanice Apple (urology)    Reason for Consultation:  Melani Meadows changes. Near complete resolution of tiny and small pulmonary nodules. Retroperitoneal adenopathy has improved- a few scattered tiny lymph nodes are present, none are enlarged by size criteria. Stable mild splenomegaly.   No new sites of metastatic disease Medications:  Loratadine-Pseudoephedrine ER (WAL-ITIN D 24 HOUR)  MG Oral Tablet 24 Hr, Take 1 tablet by mouth once daily. , Disp: 90 tablet, Rfl: 3  Fluticasone Propionate 50 MCG/ACT Nasal Suspension, 2 sprays by Nasal route daily. , Disp: 3 each, Rfl 2.2 sq meters (09/15 1513)  Pulse: 85 (09/15 1513)  BP: 115/80 (09/15 1513)  Temp: 97.3 °F (36.3 °C) (09/15 1513)  Do Not Use - Resp Rate: --  SpO2: 99 % (09/15 1513)    Wt Readings from Last 6 Encounters:  09/15/21 : 102.1 kg (225 lb)  09/02/21 : 102.2 kg Results   Component Value Date     09/15/2021     07/14/2021     (H) 05/10/2021     02/04/2021     Lab Results   Component Value Date    AFPTM 1.7 09/15/2021    AFPTM 1.9 07/14/2021    AFPTM 3.3 05/10/2021    AFPTM 1.6 02/04/202 were shortly after finishing chemotherapy thus he likely had a suboptimal antibody response. *Subcentimeter pulmonary nodules  -Most recent CT is stable.     -Continue to monitor with CT chest surveillance as above    *Anemia due to chemotherapy  -Hemogl

## 2021-09-17 LAB — BETA HCG QUANT (TUMOR MARKER): <1 IU/L

## 2021-10-15 ENCOUNTER — TELEPHONE (OUTPATIENT)
Dept: INTERNAL MEDICINE CLINIC | Facility: CLINIC | Age: 41
End: 2021-10-15

## 2021-10-15 ENCOUNTER — NURSE ONLY (OUTPATIENT)
Dept: INTERNAL MEDICINE CLINIC | Facility: CLINIC | Age: 41
End: 2021-10-15
Payer: COMMERCIAL

## 2021-10-15 DIAGNOSIS — Z00.00 LABORATORY EXAMINATION ORDERED AS PART OF A ROUTINE GENERAL MEDICAL EXAMINATION: Primary | ICD-10-CM

## 2021-10-15 DIAGNOSIS — Z23 NEED FOR INFLUENZA VACCINATION: Primary | ICD-10-CM

## 2021-10-15 PROCEDURE — 90686 IIV4 VACC NO PRSV 0.5 ML IM: CPT | Performed by: INTERNAL MEDICINE

## 2021-10-15 PROCEDURE — 90471 IMMUNIZATION ADMIN: CPT | Performed by: INTERNAL MEDICINE

## 2021-11-13 ENCOUNTER — HOSPITAL ENCOUNTER (OUTPATIENT)
Dept: CT IMAGING | Facility: HOSPITAL | Age: 41
Discharge: HOME | End: 2021-11-13
Attending: INTERNAL MEDICINE
Payer: COMMERCIAL

## 2021-11-13 DIAGNOSIS — C62.91 CANCER, TESTIS, SEMINOMA, RIGHT (HCC): ICD-10-CM

## 2021-11-13 PROCEDURE — 71250 CT THORAX DX C-: CPT | Performed by: INTERNAL MEDICINE

## 2021-11-15 ENCOUNTER — OFFICE VISIT (OUTPATIENT)
Dept: HEMATOLOGY/ONCOLOGY | Facility: HOSPITAL | Age: 41
End: 2021-11-15
Attending: INTERNAL MEDICINE
Payer: COMMERCIAL

## 2021-11-15 VITALS
HEIGHT: 70.39 IN | DIASTOLIC BLOOD PRESSURE: 80 MMHG | SYSTOLIC BLOOD PRESSURE: 121 MMHG | OXYGEN SATURATION: 98 % | HEART RATE: 100 BPM | TEMPERATURE: 97 F | BODY MASS INDEX: 31.57 KG/M2 | WEIGHT: 223 LBS | RESPIRATION RATE: 16 BRPM

## 2021-11-15 DIAGNOSIS — N62 GYNECOMASTIA, MALE: ICD-10-CM

## 2021-11-15 DIAGNOSIS — C62.91 CANCER, TESTIS, SEMINOMA, RIGHT (HCC): Primary | ICD-10-CM

## 2021-11-15 DIAGNOSIS — G62.0 CHEMOTHERAPY-INDUCED PERIPHERAL NEUROPATHY (HCC): ICD-10-CM

## 2021-11-15 DIAGNOSIS — T45.1X5A CHEMOTHERAPY-INDUCED PERIPHERAL NEUROPATHY (HCC): ICD-10-CM

## 2021-11-15 DIAGNOSIS — R00.0 TACHYCARDIA: ICD-10-CM

## 2021-11-15 PROCEDURE — 99215 OFFICE O/P EST HI 40 MIN: CPT | Performed by: INTERNAL MEDICINE

## 2021-11-15 NOTE — PROGRESS NOTES
Pt here for 2mth MD f/u visit for h/o testicular ca. Had CT chest done on 11/13. Pt report heart racing at times with exertion. Neuropathy of hands and feet slightly improved. No other complaints.        Pt given order for CT scan along with oral contrast

## 2021-11-15 NOTE — PROGRESS NOTES
Hematology/Oncology Clinic Follow Up Visit    Patient Name: Faye Pichardo  Medical Record Number: [id number]   YOB: 1980    PCP: Dr. Vane Anderson  Other providers: Dr. Buddy Dickey (urology)    Reason for Consultation:  Dee Baker changes. Near complete resolution of tiny and small pulmonary nodules. Retroperitoneal adenopathy has improved- a few scattered tiny lymph nodes are present, none are enlarged by size criteria. Stable mild splenomegaly.   No new sites of metastatic disease History:  Past Medical History:   Diagnosis Date   • Acute pharyngitis    • Allergic rhinitis    • Bronchitis    • Cancer, testis, seminoma, right ([facility] [address] 2/4/2021   • Dermatitis    • Visual impairment     glasses     Past Surgical History:   Procedure Later angioplasty   • Cancer Maternal Grandmother 61        colon   • Cancer Maternal Grandfather 88        pancreatic     Review of Systems:  A 10-point ROS was done with pertinent positives and negative per the HPI    Vital Signs:  Height: 178.8 cm (5' 10.39\" 02/05/2020    GFRNAA 75 11/15/2021    GFRAA 87 11/15/2021    CA 9.4 11/15/2021    OSMOCALC 286 11/15/2021    ALKPHO 59 11/15/2021    AST 14 (L) 11/15/2021    ALT 32 11/15/2021    BILT 0.3 11/15/2021    TP 7.3 11/15/2021    ALB 3.9 11/15/2021    GLOBULIN 3. -chest imaging (CT preferred given hx of pulm mets) q2 mths x 1 yr then q3 mths x 1 yr then annually until 5 yrs out (due next 1/2022)- order placed   -CT a/p q4mths x 1 yr then q6mths x 1 yrs then annually until 4 yrs out (due next 1/2022)- order place

## 2021-11-19 ENCOUNTER — IMMUNIZATION (OUTPATIENT)
Dept: LAB | Facility: HOSPITAL | Age: 41
End: 2021-11-19
Attending: EMERGENCY MEDICINE
Payer: COMMERCIAL

## 2021-11-19 DIAGNOSIS — Z23 NEED FOR VACCINATION: Primary | ICD-10-CM

## 2021-11-19 PROCEDURE — 0003A SARSCOV2 VAC 30MCG/0.3ML IM: CPT | Performed by: NURSE PRACTITIONER

## 2021-11-26 ENCOUNTER — LAB ENCOUNTER (OUTPATIENT)
Dept: LAB | Facility: HOSPITAL | Age: 41
End: 2021-11-26
Attending: INTERNAL MEDICINE
Payer: COMMERCIAL

## 2021-11-26 DIAGNOSIS — Z00.00 LABORATORY EXAMINATION ORDERED AS PART OF A ROUTINE GENERAL MEDICAL EXAMINATION: ICD-10-CM

## 2021-11-26 PROCEDURE — 83036 HEMOGLOBIN GLYCOSYLATED A1C: CPT

## 2021-11-26 PROCEDURE — 81001 URINALYSIS AUTO W/SCOPE: CPT

## 2021-11-26 PROCEDURE — 80061 LIPID PANEL: CPT

## 2021-11-26 PROCEDURE — 36415 COLL VENOUS BLD VENIPUNCTURE: CPT

## 2021-11-26 PROCEDURE — 84443 ASSAY THYROID STIM HORMONE: CPT

## 2021-12-21 ENCOUNTER — OFFICE VISIT (OUTPATIENT)
Dept: INTERNAL MEDICINE CLINIC | Facility: CLINIC | Age: 41
End: 2021-12-21
Payer: COMMERCIAL

## 2021-12-21 VITALS
BODY MASS INDEX: 32.14 KG/M2 | HEART RATE: 101 BPM | OXYGEN SATURATION: 97 % | HEIGHT: 69.75 IN | WEIGHT: 222 LBS | RESPIRATION RATE: 16 BRPM | DIASTOLIC BLOOD PRESSURE: 74 MMHG | TEMPERATURE: 98 F | SYSTOLIC BLOOD PRESSURE: 118 MMHG

## 2021-12-21 DIAGNOSIS — Z23 NEED FOR PNEUMOCOCCAL VACCINATION: ICD-10-CM

## 2021-12-21 DIAGNOSIS — R00.0 TACHYCARDIA: ICD-10-CM

## 2021-12-21 DIAGNOSIS — Z00.00 ANNUAL PHYSICAL EXAM: Primary | ICD-10-CM

## 2021-12-21 DIAGNOSIS — R06.00 DOE (DYSPNEA ON EXERTION): ICD-10-CM

## 2021-12-21 PROBLEM — Z51.11 ENCOUNTER FOR ANTINEOPLASTIC CHEMOTHERAPY: Status: RESOLVED | Noted: 2021-02-16 | Resolved: 2021-12-21

## 2021-12-21 PROBLEM — E87.6 HYPOKALEMIA: Status: RESOLVED | Noted: 2021-04-23 | Resolved: 2021-12-21

## 2021-12-21 PROBLEM — T45.1X5A CINV (CHEMOTHERAPY-INDUCED NAUSEA AND VOMITING): Status: RESOLVED | Noted: 2021-02-22 | Resolved: 2021-12-21

## 2021-12-21 PROBLEM — K21.9 GERD (GASTROESOPHAGEAL REFLUX DISEASE): Status: RESOLVED | Noted: 2021-02-22 | Resolved: 2021-12-21

## 2021-12-21 PROBLEM — K52.1 CHEMOTHERAPY INDUCED DIARRHEA: Status: RESOLVED | Noted: 2021-02-22 | Resolved: 2021-12-21

## 2021-12-21 PROBLEM — Z85.47 HISTORY OF TESTICULAR CANCER: Status: ACTIVE | Noted: 2021-12-21

## 2021-12-21 PROBLEM — C62.91 SEMINOMA OF RIGHT TESTIS (HCC): Status: ACTIVE | Noted: 2021-02-04

## 2021-12-21 PROBLEM — C62.91: Status: RESOLVED | Noted: 2021-02-04 | Resolved: 2021-12-21

## 2021-12-21 PROBLEM — T78.40XA DRUG-INDUCED HYPERSENSITIVITY REACTION: Status: RESOLVED | Noted: 2021-03-12 | Resolved: 2021-12-21

## 2021-12-21 PROBLEM — H93.13 TINNITUS OF BOTH EARS: Status: RESOLVED | Noted: 2021-07-15 | Resolved: 2021-12-21

## 2021-12-21 PROBLEM — R11.2 CINV (CHEMOTHERAPY-INDUCED NAUSEA AND VOMITING): Status: RESOLVED | Noted: 2021-02-22 | Resolved: 2021-12-21

## 2021-12-21 PROBLEM — Z85.47 HISTORY OF TESTICULAR CANCER: Status: RESOLVED | Noted: 2021-12-21 | Resolved: 2021-12-21

## 2021-12-21 PROBLEM — T45.1X5A CHEMOTHERAPY INDUCED DIARRHEA: Status: RESOLVED | Noted: 2021-02-22 | Resolved: 2021-12-21

## 2021-12-21 PROBLEM — T45.1X5A ANEMIA ASSOCIATED WITH CHEMOTHERAPY: Status: RESOLVED | Noted: 2021-04-23 | Resolved: 2021-12-21

## 2021-12-21 PROBLEM — D64.81 ANEMIA ASSOCIATED WITH CHEMOTHERAPY: Status: RESOLVED | Noted: 2021-04-23 | Resolved: 2021-12-21

## 2021-12-21 PROCEDURE — 3008F BODY MASS INDEX DOCD: CPT | Performed by: INTERNAL MEDICINE

## 2021-12-21 PROCEDURE — 99396 PREV VISIT EST AGE 40-64: CPT | Performed by: INTERNAL MEDICINE

## 2021-12-21 PROCEDURE — 99213 OFFICE O/P EST LOW 20 MIN: CPT | Performed by: INTERNAL MEDICINE

## 2021-12-21 PROCEDURE — 3078F DIAST BP <80 MM HG: CPT | Performed by: INTERNAL MEDICINE

## 2021-12-21 PROCEDURE — 3074F SYST BP LT 130 MM HG: CPT | Performed by: INTERNAL MEDICINE

## 2021-12-21 PROCEDURE — 90471 IMMUNIZATION ADMIN: CPT | Performed by: INTERNAL MEDICINE

## 2021-12-21 PROCEDURE — 90732 PPSV23 VACC 2 YRS+ SUBQ/IM: CPT | Performed by: INTERNAL MEDICINE

## 2021-12-22 NOTE — PROGRESS NOTES
Subjective:   Patient ID: Merlyn Peoples is a 37year old male.     HPI  Merlyn Peoples is a 37year old male who presents for a complete physical exam.   HPI:   Pt complains of sob and tachycardia since stopping chemo for testicular cance ER (WAL-TOM [facility]LETT 24 HOUR)  MG Oral Tablet 24 Hr Take 1 tablet by mouth once daily. 90 tablet 3   • Fluticasone Propionate 50 MCG/ACT Nasal Suspension 2 sprays by Nasal route daily.  3 each 3   • hydrOXYzine 10 MG Oral Tab TAKE 1 TABLET BY MOUTH EVERY NI clear  EYES:PERRLA, EOMI, normal optic disk,conjunctiva are clear  NECK: supple,no adenopathy,no bruits  LUNGS: clear to auscultation  CARDIO: RRR without murmur  GI: good BS's,no masses, HSM or tenderness  :deferred  MUSCULOSKELETAL: back is not tender Contrast *    OTHER (SEE COMMENTS)    Comment:Swollen uvula    Objective:   Physical Exam    Assessment & Plan:   Annual physical exam  (primary encounter diagnosis)  Need for pneumococcal vaccination  Tachycardia  BOWMAN (dyspnea on exertion)    Orders Place

## 2021-12-28 ENCOUNTER — HOSPITAL ENCOUNTER (OUTPATIENT)
Dept: CV DIAGNOSTICS | Facility: HOSPITAL | Age: 41
Discharge: HOME | End: 2021-12-28
Attending: INTERNAL MEDICINE
Payer: COMMERCIAL

## 2021-12-28 DIAGNOSIS — R06.00 DOE (DYSPNEA ON EXERTION): ICD-10-CM

## 2021-12-28 DIAGNOSIS — R00.0 TACHYCARDIA: ICD-10-CM

## 2021-12-28 PROCEDURE — 93306 TTE W/DOPPLER COMPLETE: CPT | Performed by: INTERNAL MEDICINE

## 2022-01-15 ENCOUNTER — HOSPITAL ENCOUNTER (OUTPATIENT)
Dept: CT IMAGING | Facility: HOSPITAL | Age: 42
Discharge: HOME | End: 2022-01-15
Attending: INTERNAL MEDICINE
Payer: COMMERCIAL

## 2022-01-15 DIAGNOSIS — C62.91 CANCER, TESTIS, SEMINOMA, RIGHT (HCC): ICD-10-CM

## 2022-01-15 PROCEDURE — 71250 CT THORAX DX C-: CPT | Performed by: INTERNAL MEDICINE

## 2022-01-15 PROCEDURE — 74176 CT ABD & PELVIS W/O CONTRAST: CPT | Performed by: INTERNAL MEDICINE

## 2022-01-17 ENCOUNTER — OFFICE VISIT (OUTPATIENT)
Dept: HEMATOLOGY/ONCOLOGY | Facility: HOSPITAL | Age: 42
End: 2022-01-17
Attending: INTERNAL MEDICINE
Payer: COMMERCIAL

## 2022-01-17 VITALS
HEART RATE: 108 BPM | BODY MASS INDEX: 31.28 KG/M2 | DIASTOLIC BLOOD PRESSURE: 86 MMHG | OXYGEN SATURATION: 99 % | RESPIRATION RATE: 16 BRPM | SYSTOLIC BLOOD PRESSURE: 128 MMHG | WEIGHT: 221 LBS | TEMPERATURE: 97 F | HEIGHT: 70.39 IN

## 2022-01-17 DIAGNOSIS — C62.91 SEMINOMA OF RIGHT TESTIS (HCC): Primary | ICD-10-CM

## 2022-01-17 DIAGNOSIS — C62.91 CANCER, TESTIS, SEMINOMA, RIGHT (HCC): ICD-10-CM

## 2022-01-17 DIAGNOSIS — T45.1X5A CHEMOTHERAPY-INDUCED PERIPHERAL NEUROPATHY (HCC): ICD-10-CM

## 2022-01-17 DIAGNOSIS — G62.0 CHEMOTHERAPY-INDUCED PERIPHERAL NEUROPATHY (HCC): ICD-10-CM

## 2022-01-17 DIAGNOSIS — N62 GYNECOMASTIA, MALE: ICD-10-CM

## 2022-01-17 LAB
AFP-TM SERPL-MCNC: 1.9 NG/ML (ref ?–8)
ALBUMIN SERPL-MCNC: 4.1 G/DL (ref 3.4–5)
ALBUMIN/GLOB SERPL: 1.1 {RATIO} (ref 1–2)
ALP LIVER SERPL-CCNC: 66 U/L
ALT SERPL-CCNC: 30 U/L
ANION GAP SERPL CALC-SCNC: 5 MMOL/L (ref 0–18)
AST SERPL-CCNC: 17 U/L (ref 15–37)
BASOPHILS # BLD AUTO: 0.03 X10(3) UL (ref 0–0.2)
BASOPHILS NFR BLD AUTO: 0.4 %
BILIRUB SERPL-MCNC: 0.3 MG/DL (ref 0.1–2)
BUN BLD-MCNC: 21 MG/DL (ref 7–18)
CALCIUM BLD-MCNC: 8.9 MG/DL (ref 8.5–10.1)
CHLORIDE SERPL-SCNC: 105 MMOL/L (ref 98–112)
CO2 SERPL-SCNC: 28 MMOL/L (ref 21–32)
CREAT BLD-MCNC: 1.27 MG/DL
EOSINOPHIL # BLD AUTO: 0.29 X10(3) UL (ref 0–0.7)
EOSINOPHIL NFR BLD AUTO: 3.7 %
ERYTHROCYTE [DISTWIDTH] IN BLOOD BY AUTOMATED COUNT: 12.7 %
GLOBULIN PLAS-MCNC: 3.6 G/DL (ref 2.8–4.4)
GLUCOSE BLD-MCNC: 99 MG/DL (ref 70–99)
HCT VFR BLD AUTO: 41.2 %
HGB BLD-MCNC: 13.8 G/DL
IMM GRANULOCYTES # BLD AUTO: 0.02 X10(3) UL (ref 0–1)
IMM GRANULOCYTES NFR BLD: 0.3 %
LDH SERPL L TO P-CCNC: 153 U/L
LYMPHOCYTES # BLD AUTO: 1.7 X10(3) UL (ref 1–4)
LYMPHOCYTES NFR BLD AUTO: 21.9 %
MCH RBC QN AUTO: 30.7 PG (ref 26–34)
MCHC RBC AUTO-ENTMCNC: 33.5 G/DL (ref 31–37)
MCV RBC AUTO: 91.8 FL
MONOCYTES # BLD AUTO: 0.62 X10(3) UL (ref 0.1–1)
MONOCYTES NFR BLD AUTO: 8 %
NEUTROPHILS # BLD AUTO: 5.11 X10 (3) UL (ref 1.5–7.7)
NEUTROPHILS # BLD AUTO: 5.11 X10(3) UL (ref 1.5–7.7)
NEUTROPHILS NFR BLD AUTO: 65.7 %
OSMOLALITY SERPL CALC.SUM OF ELEC: 289 MOSM/KG (ref 275–295)
PLATELET # BLD AUTO: 294 10(3)UL (ref 150–450)
POTASSIUM SERPL-SCNC: 3.6 MMOL/L (ref 3.5–5.1)
PROT SERPL-MCNC: 7.7 G/DL (ref 6.4–8.2)
RBC # BLD AUTO: 4.49 X10(6)UL
SODIUM SERPL-SCNC: 138 MMOL/L (ref 136–145)
WBC # BLD AUTO: 7.8 X10(3) UL (ref 4–11)

## 2022-01-17 PROCEDURE — 99215 OFFICE O/P EST HI 40 MIN: CPT | Performed by: INTERNAL MEDICINE

## 2022-01-17 NOTE — PROGRESS NOTES
Hematology/Oncology Clinic Follow Up Visit    Patient Name: Krysten Langston  Medical Record Number: [id number]   YOB: 1980    PCP: Dr. Michael Monterroso  Other providers: Dr. Mary Jo Arenas (urology)    Reason for Consultation:  Rhonda Croft changes. Near complete resolution of tiny and small pulmonary nodules. Retroperitoneal adenopathy has improved- a few scattered tiny lymph nodes are present, none are enlarged by size criteria. Stable mild splenomegaly.   No new sites of metastatic disease infections. No bowel or bladder changes. He received a third dose booster of the COVID-19 vaccine on 11/19/2021.     Past Medical History:  Past Medical History:   Diagnosis Date   • Acute pharyngitis    • Allergic rhinitis    • Bronchitis    • Cancer, History:  Family History   Problem Relation Age of Onset   • Heart Disease Father 64        angioplasty   • Cancer Maternal Grandmother 61        colon   • Cancer Maternal Grandfather 80        pancreatic     Review of Systems:  A 10-point ROS was done wit 14.7 07/14/2021    CREATSERUM 1.27 01/17/2022    CREATSERUM 1.20 11/15/2021    CREATSERUM 1.05 09/15/2021    ANIONGAP 5 01/17/2022    GFR 83 02/05/2020    GFRNAA 70 01/17/2022    GFRAA 81 01/17/2022    CA 8.9 01/17/2022    OSMOCALC 289 01/17/2022    ALKPHO time  -Continue surveillance as below:                -H&P with tumor markers/labs q2 mths for 1st yr (until 5/2022) then q3mths for year 2, then q6mths for years 3-4, annually in year 5. (due next 3/2022)               -chest imaging (CT preferred given h

## 2022-01-19 LAB — BETA HCG QUANT (TUMOR MARKER): <1 IU/L

## 2022-03-12 ENCOUNTER — HOSPITAL ENCOUNTER (OUTPATIENT)
Dept: CT IMAGING | Facility: HOSPITAL | Age: 42
Discharge: HOME | End: 2022-03-12
Attending: INTERNAL MEDICINE
Payer: COMMERCIAL

## 2022-03-12 DIAGNOSIS — C62.91 CANCER, TESTIS, SEMINOMA, RIGHT (HCC): ICD-10-CM

## 2022-03-12 PROCEDURE — 71250 CT THORAX DX C-: CPT | Performed by: INTERNAL MEDICINE

## 2022-03-16 ENCOUNTER — OFFICE VISIT (OUTPATIENT)
Dept: HEMATOLOGY/ONCOLOGY | Facility: HOSPITAL | Age: 42
End: 2022-03-16
Attending: INTERNAL MEDICINE
Payer: COMMERCIAL

## 2022-03-16 VITALS
BODY MASS INDEX: 31 KG/M2 | RESPIRATION RATE: 16 BRPM | HEART RATE: 101 BPM | TEMPERATURE: 98 F | OXYGEN SATURATION: 99 % | SYSTOLIC BLOOD PRESSURE: 117 MMHG | WEIGHT: 221.81 LBS | DIASTOLIC BLOOD PRESSURE: 77 MMHG

## 2022-03-16 DIAGNOSIS — D64.9 NORMOCYTIC ANEMIA: ICD-10-CM

## 2022-03-16 DIAGNOSIS — G62.0 CHEMOTHERAPY-INDUCED PERIPHERAL NEUROPATHY (HCC): ICD-10-CM

## 2022-03-16 DIAGNOSIS — T45.1X5A CHEMOTHERAPY-INDUCED PERIPHERAL NEUROPATHY (HCC): ICD-10-CM

## 2022-03-16 DIAGNOSIS — C62.91 SEMINOMA OF RIGHT TESTIS (HCC): ICD-10-CM

## 2022-03-16 DIAGNOSIS — R10.819 RIGHT FLANK TENDERNESS: ICD-10-CM

## 2022-03-16 DIAGNOSIS — C62.91 CANCER, TESTIS, SEMINOMA, RIGHT (HCC): Primary | ICD-10-CM

## 2022-03-16 LAB
AFP-TM SERPL-MCNC: 1.8 NG/ML (ref ?–8)
ALBUMIN SERPL-MCNC: 4 G/DL (ref 3.4–5)
ALBUMIN/GLOB SERPL: 1.3 {RATIO} (ref 1–2)
ALP LIVER SERPL-CCNC: 58 U/L
ALT SERPL-CCNC: 28 U/L
ANION GAP SERPL CALC-SCNC: 4 MMOL/L (ref 0–18)
AST SERPL-CCNC: 15 U/L (ref 15–37)
BASOPHILS # BLD AUTO: 0.04 X10(3) UL (ref 0–0.2)
BASOPHILS NFR BLD AUTO: 0.6 %
BILIRUB SERPL-MCNC: 0.2 MG/DL (ref 0.1–2)
BUN BLD-MCNC: 19 MG/DL (ref 7–18)
CALCIUM BLD-MCNC: 8.9 MG/DL (ref 8.5–10.1)
CHLORIDE SERPL-SCNC: 107 MMOL/L (ref 98–112)
CO2 SERPL-SCNC: 29 MMOL/L (ref 21–32)
CREAT BLD-MCNC: 1.18 MG/DL
EOSINOPHIL # BLD AUTO: 0.13 X10(3) UL (ref 0–0.7)
EOSINOPHIL NFR BLD AUTO: 1.8 %
ERYTHROCYTE [DISTWIDTH] IN BLOOD BY AUTOMATED COUNT: 13 %
FASTING STATUS PATIENT QL REPORTED: NO
GLOBULIN PLAS-MCNC: 3.1 G/DL (ref 2.8–4.4)
GLUCOSE BLD-MCNC: 92 MG/DL (ref 70–99)
HCT VFR BLD AUTO: 36.7 %
HGB BLD-MCNC: 12.9 G/DL
IMM GRANULOCYTES # BLD AUTO: 0.02 X10(3) UL (ref 0–1)
IMM GRANULOCYTES NFR BLD: 0.3 %
LDH SERPL L TO P-CCNC: 154 U/L
LYMPHOCYTES # BLD AUTO: 1.78 X10(3) UL (ref 1–4)
LYMPHOCYTES NFR BLD AUTO: 25.1 %
MCH RBC QN AUTO: 31.4 PG (ref 26–34)
MCHC RBC AUTO-ENTMCNC: 35.1 G/DL (ref 31–37)
MCV RBC AUTO: 89.3 FL
MONOCYTES # BLD AUTO: 0.52 X10(3) UL (ref 0.1–1)
MONOCYTES NFR BLD AUTO: 7.3 %
NEUTROPHILS # BLD AUTO: 4.61 X10 (3) UL (ref 1.5–7.7)
NEUTROPHILS # BLD AUTO: 4.61 X10(3) UL (ref 1.5–7.7)
NEUTROPHILS NFR BLD AUTO: 64.9 %
OSMOLALITY SERPL CALC.SUM OF ELEC: 292 MOSM/KG (ref 275–295)
PLATELET # BLD AUTO: 274 10(3)UL (ref 150–450)
POTASSIUM SERPL-SCNC: 4 MMOL/L (ref 3.5–5.1)
PROT SERPL-MCNC: 7.1 G/DL (ref 6.4–8.2)
RBC # BLD AUTO: 4.11 X10(6)UL
SODIUM SERPL-SCNC: 140 MMOL/L (ref 136–145)
WBC # BLD AUTO: 7.1 X10(3) UL (ref 4–11)

## 2022-03-16 PROCEDURE — 99215 OFFICE O/P EST HI 40 MIN: CPT | Performed by: INTERNAL MEDICINE

## 2022-03-20 LAB — BETA HCG QUANT (TUMOR MARKER): <1 IU/L

## 2022-05-14 ENCOUNTER — HOSPITAL ENCOUNTER (OUTPATIENT)
Dept: CT IMAGING | Facility: HOSPITAL | Age: 42
Discharge: HOME | End: 2022-05-14
Attending: INTERNAL MEDICINE
Payer: COMMERCIAL

## 2022-05-14 DIAGNOSIS — C62.91 CANCER, TESTIS, SEMINOMA, RIGHT (HCC): ICD-10-CM

## 2022-05-14 PROCEDURE — 71250 CT THORAX DX C-: CPT | Performed by: INTERNAL MEDICINE

## 2022-05-14 PROCEDURE — 74176 CT ABD & PELVIS W/O CONTRAST: CPT | Performed by: INTERNAL MEDICINE

## 2022-05-18 ENCOUNTER — OFFICE VISIT (OUTPATIENT)
Dept: HEMATOLOGY/ONCOLOGY | Facility: HOSPITAL | Age: 42
End: 2022-05-18
Attending: INTERNAL MEDICINE
Payer: COMMERCIAL

## 2022-05-18 VITALS
DIASTOLIC BLOOD PRESSURE: 81 MMHG | WEIGHT: 225.19 LBS | HEIGHT: 70.39 IN | HEART RATE: 82 BPM | SYSTOLIC BLOOD PRESSURE: 120 MMHG | TEMPERATURE: 98 F | BODY MASS INDEX: 31.88 KG/M2 | RESPIRATION RATE: 16 BRPM | OXYGEN SATURATION: 99 %

## 2022-05-18 DIAGNOSIS — C62.91 SEMINOMA OF RIGHT TESTIS (HCC): ICD-10-CM

## 2022-05-18 DIAGNOSIS — D64.9 NORMOCYTIC ANEMIA: ICD-10-CM

## 2022-05-18 DIAGNOSIS — R10.819 RIGHT FLANK TENDERNESS: ICD-10-CM

## 2022-05-18 DIAGNOSIS — G62.0 CHEMOTHERAPY-INDUCED PERIPHERAL NEUROPATHY (HCC): ICD-10-CM

## 2022-05-18 DIAGNOSIS — C62.91 CANCER, TESTIS, SEMINOMA, RIGHT (HCC): Primary | ICD-10-CM

## 2022-05-18 DIAGNOSIS — T45.1X5A CHEMOTHERAPY-INDUCED PERIPHERAL NEUROPATHY (HCC): ICD-10-CM

## 2022-05-18 LAB
AFP-TM SERPL-MCNC: 1.8 NG/ML (ref ?–8)
ALBUMIN SERPL-MCNC: 4 G/DL (ref 3.4–5)
ALBUMIN/GLOB SERPL: 1.2 {RATIO} (ref 1–2)
ALP LIVER SERPL-CCNC: 57 U/L
ALT SERPL-CCNC: 29 U/L
ANION GAP SERPL CALC-SCNC: 7 MMOL/L (ref 0–18)
AST SERPL-CCNC: 25 U/L (ref 15–37)
BASOPHILS # BLD AUTO: 0.05 X10(3) UL (ref 0–0.2)
BASOPHILS NFR BLD AUTO: 0.6 %
BILIRUB SERPL-MCNC: 0.3 MG/DL (ref 0.1–2)
BUN BLD-MCNC: 21 MG/DL (ref 7–18)
CALCIUM BLD-MCNC: 9.1 MG/DL (ref 8.5–10.1)
CHLORIDE SERPL-SCNC: 106 MMOL/L (ref 98–112)
CO2 SERPL-SCNC: 27 MMOL/L (ref 21–32)
CREAT BLD-MCNC: 1.1 MG/DL
EOSINOPHIL # BLD AUTO: 0.12 X10(3) UL (ref 0–0.7)
EOSINOPHIL NFR BLD AUTO: 1.5 %
ERYTHROCYTE [DISTWIDTH] IN BLOOD BY AUTOMATED COUNT: 12.7 %
FASTING STATUS PATIENT QL REPORTED: NO
GLOBULIN PLAS-MCNC: 3.3 G/DL (ref 2.8–4.4)
GLUCOSE BLD-MCNC: 93 MG/DL (ref 70–99)
HCT VFR BLD AUTO: 40.6 %
HGB BLD-MCNC: 13.8 G/DL
IMM GRANULOCYTES # BLD AUTO: 0.03 X10(3) UL (ref 0–1)
IMM GRANULOCYTES NFR BLD: 0.4 %
LDH SERPL L TO P-CCNC: 161 U/L
LYMPHOCYTES # BLD AUTO: 1.95 X10(3) UL (ref 1–4)
LYMPHOCYTES NFR BLD AUTO: 24.8 %
MCH RBC QN AUTO: 31 PG (ref 26–34)
MCHC RBC AUTO-ENTMCNC: 34 G/DL (ref 31–37)
MCV RBC AUTO: 91.2 FL
MONOCYTES # BLD AUTO: 0.57 X10(3) UL (ref 0.1–1)
MONOCYTES NFR BLD AUTO: 7.3 %
NEUTROPHILS # BLD AUTO: 5.14 X10 (3) UL (ref 1.5–7.7)
NEUTROPHILS # BLD AUTO: 5.14 X10(3) UL (ref 1.5–7.7)
NEUTROPHILS NFR BLD AUTO: 65.4 %
OSMOLALITY SERPL CALC.SUM OF ELEC: 293 MOSM/KG (ref 275–295)
PLATELET # BLD AUTO: 294 10(3)UL (ref 150–450)
POTASSIUM SERPL-SCNC: 4.3 MMOL/L (ref 3.5–5.1)
PROT SERPL-MCNC: 7.3 G/DL (ref 6.4–8.2)
RBC # BLD AUTO: 4.45 X10(6)UL
SODIUM SERPL-SCNC: 140 MMOL/L (ref 136–145)
WBC # BLD AUTO: 7.9 X10(3) UL (ref 4–11)

## 2022-05-18 PROCEDURE — 99215 OFFICE O/P EST HI 40 MIN: CPT | Performed by: INTERNAL MEDICINE

## 2022-05-20 LAB — BETA HCG QUANT (TUMOR MARKER): <1 IU/L

## 2022-08-13 ENCOUNTER — HOSPITAL ENCOUNTER (OUTPATIENT)
Dept: CT IMAGING | Facility: HOSPITAL | Age: 42
Discharge: HOME | End: 2022-08-13
Attending: INTERNAL MEDICINE
Payer: COMMERCIAL

## 2022-08-13 DIAGNOSIS — C62.91 CANCER, TESTIS, SEMINOMA, RIGHT (HCC): ICD-10-CM

## 2022-08-13 PROCEDURE — 71250 CT THORAX DX C-: CPT | Performed by: INTERNAL MEDICINE

## 2022-08-17 ENCOUNTER — OFFICE VISIT (OUTPATIENT)
Dept: HEMATOLOGY/ONCOLOGY | Facility: HOSPITAL | Age: 42
End: 2022-08-17
Attending: INTERNAL MEDICINE
Payer: COMMERCIAL

## 2022-08-17 VITALS
BODY MASS INDEX: 31.45 KG/M2 | TEMPERATURE: 98 F | HEART RATE: 97 BPM | OXYGEN SATURATION: 98 % | HEIGHT: 70.39 IN | RESPIRATION RATE: 18 BRPM | DIASTOLIC BLOOD PRESSURE: 82 MMHG | WEIGHT: 222.13 LBS | SYSTOLIC BLOOD PRESSURE: 126 MMHG

## 2022-08-17 DIAGNOSIS — T45.1X5A CHEMOTHERAPY-INDUCED PERIPHERAL NEUROPATHY (HCC): ICD-10-CM

## 2022-08-17 DIAGNOSIS — R10.819 RIGHT FLANK TENDERNESS: ICD-10-CM

## 2022-08-17 DIAGNOSIS — C62.91 CANCER, TESTIS, SEMINOMA, RIGHT (HCC): Primary | ICD-10-CM

## 2022-08-17 DIAGNOSIS — G62.0 CHEMOTHERAPY-INDUCED PERIPHERAL NEUROPATHY (HCC): ICD-10-CM

## 2022-08-17 DIAGNOSIS — H93.13 TINNITUS OF BOTH EARS: ICD-10-CM

## 2022-08-17 DIAGNOSIS — C62.91 SEMINOMA OF RIGHT TESTIS (HCC): ICD-10-CM

## 2022-08-17 LAB
AFP-TM SERPL-MCNC: 1.7 NG/ML (ref ?–8)
ALBUMIN SERPL-MCNC: 4.1 G/DL (ref 3.4–5)
ALBUMIN/GLOB SERPL: 1.2 {RATIO} (ref 1–2)
ALP LIVER SERPL-CCNC: 67 U/L
ALT SERPL-CCNC: 33 U/L
ANION GAP SERPL CALC-SCNC: 2 MMOL/L (ref 0–18)
AST SERPL-CCNC: 22 U/L (ref 15–37)
BASOPHILS # BLD AUTO: 0.04 X10(3) UL (ref 0–0.2)
BASOPHILS NFR BLD AUTO: 0.7 %
BILIRUB SERPL-MCNC: 0.3 MG/DL (ref 0.1–2)
BUN BLD-MCNC: 22 MG/DL (ref 7–18)
CALCIUM BLD-MCNC: 8.9 MG/DL (ref 8.5–10.1)
CHLORIDE SERPL-SCNC: 105 MMOL/L (ref 98–112)
CO2 SERPL-SCNC: 28 MMOL/L (ref 21–32)
CREAT BLD-MCNC: 1.14 MG/DL
EOSINOPHIL # BLD AUTO: 0.14 X10(3) UL (ref 0–0.7)
EOSINOPHIL NFR BLD AUTO: 2.3 %
ERYTHROCYTE [DISTWIDTH] IN BLOOD BY AUTOMATED COUNT: 12.6 %
GFR SERPLBLD BASED ON 1.73 SQ M-ARVRAT: 83 ML/MIN/1.73M2 (ref 60–?)
GLOBULIN PLAS-MCNC: 3.4 G/DL (ref 2.8–4.4)
GLUCOSE BLD-MCNC: 104 MG/DL (ref 70–99)
HCT VFR BLD AUTO: 40.2 %
HGB BLD-MCNC: 14.1 G/DL
IMM GRANULOCYTES # BLD AUTO: 0.01 X10(3) UL (ref 0–1)
IMM GRANULOCYTES NFR BLD: 0.2 %
LDH SERPL L TO P-CCNC: 165 U/L
LYMPHOCYTES # BLD AUTO: 1.76 X10(3) UL (ref 1–4)
LYMPHOCYTES NFR BLD AUTO: 28.6 %
MCH RBC QN AUTO: 31.8 PG (ref 26–34)
MCHC RBC AUTO-ENTMCNC: 35.1 G/DL (ref 31–37)
MCV RBC AUTO: 90.5 FL
MONOCYTES # BLD AUTO: 0.42 X10(3) UL (ref 0.1–1)
MONOCYTES NFR BLD AUTO: 6.8 %
NEUTROPHILS # BLD AUTO: 3.78 X10 (3) UL (ref 1.5–7.7)
NEUTROPHILS # BLD AUTO: 3.78 X10(3) UL (ref 1.5–7.7)
NEUTROPHILS NFR BLD AUTO: 61.4 %
OSMOLALITY SERPL CALC.SUM OF ELEC: 284 MOSM/KG (ref 275–295)
PLATELET # BLD AUTO: 295 10(3)UL (ref 150–450)
POTASSIUM SERPL-SCNC: 3.8 MMOL/L (ref 3.5–5.1)
PROT SERPL-MCNC: 7.5 G/DL (ref 6.4–8.2)
RBC # BLD AUTO: 4.44 X10(6)UL
SODIUM SERPL-SCNC: 135 MMOL/L (ref 136–145)
WBC # BLD AUTO: 6.2 X10(3) UL (ref 4–11)

## 2022-08-17 PROCEDURE — 99215 OFFICE O/P EST HI 40 MIN: CPT | Performed by: INTERNAL MEDICINE

## 2022-08-19 LAB — BETA HCG QUANT (TUMOR MARKER): <1 IU/L

## 2022-11-19 ENCOUNTER — HOSPITAL ENCOUNTER (OUTPATIENT)
Dept: CT IMAGING | Facility: HOSPITAL | Age: 42
Discharge: HOME | End: 2022-11-19
Attending: INTERNAL MEDICINE
Payer: COMMERCIAL

## 2022-11-19 DIAGNOSIS — C62.91 CANCER, TESTIS, SEMINOMA, RIGHT (HCC): ICD-10-CM

## 2022-11-19 PROCEDURE — 71250 CT THORAX DX C-: CPT | Performed by: INTERNAL MEDICINE

## 2022-11-19 PROCEDURE — 74176 CT ABD & PELVIS W/O CONTRAST: CPT | Performed by: INTERNAL MEDICINE

## 2022-11-23 ENCOUNTER — OFFICE VISIT (OUTPATIENT)
Dept: HEMATOLOGY/ONCOLOGY | Facility: HOSPITAL | Age: 42
End: 2022-11-23
Attending: INTERNAL MEDICINE
Payer: COMMERCIAL

## 2022-11-23 VITALS
BODY MASS INDEX: 31.14 KG/M2 | OXYGEN SATURATION: 99 % | SYSTOLIC BLOOD PRESSURE: 119 MMHG | RESPIRATION RATE: 18 BRPM | HEIGHT: 70.39 IN | TEMPERATURE: 97 F | HEART RATE: 90 BPM | DIASTOLIC BLOOD PRESSURE: 82 MMHG | WEIGHT: 220 LBS

## 2022-11-23 DIAGNOSIS — H93.13 TINNITUS OF BOTH EARS: ICD-10-CM

## 2022-11-23 DIAGNOSIS — G62.0 CHEMOTHERAPY-INDUCED PERIPHERAL NEUROPATHY (HCC): ICD-10-CM

## 2022-11-23 DIAGNOSIS — C62.91 CANCER, TESTIS, SEMINOMA, RIGHT (HCC): Primary | ICD-10-CM

## 2022-11-23 DIAGNOSIS — R10.819 RIGHT FLANK TENDERNESS: ICD-10-CM

## 2022-11-23 DIAGNOSIS — C62.91 SEMINOMA OF RIGHT TESTIS (HCC): ICD-10-CM

## 2022-11-23 DIAGNOSIS — T45.1X5A CHEMOTHERAPY-INDUCED PERIPHERAL NEUROPATHY (HCC): ICD-10-CM

## 2022-11-23 LAB
AFP-TM SERPL-MCNC: 1.7 NG/ML (ref ?–8)
ALBUMIN SERPL-MCNC: 4.2 G/DL (ref 3.4–5)
ALBUMIN/GLOB SERPL: 1.6 {RATIO} (ref 1–2)
ALP LIVER SERPL-CCNC: 67 U/L
ALT SERPL-CCNC: 28 U/L
ANION GAP SERPL CALC-SCNC: 2 MMOL/L (ref 0–18)
AST SERPL-CCNC: 13 U/L (ref 15–37)
BASOPHILS # BLD AUTO: 0.03 X10(3) UL (ref 0–0.2)
BASOPHILS NFR BLD AUTO: 0.4 %
BILIRUB SERPL-MCNC: 0.3 MG/DL (ref 0.1–2)
BUN BLD-MCNC: 18 MG/DL (ref 7–18)
CALCIUM BLD-MCNC: 9.1 MG/DL (ref 8.5–10.1)
CHLORIDE SERPL-SCNC: 106 MMOL/L (ref 98–112)
CO2 SERPL-SCNC: 29 MMOL/L (ref 21–32)
CREAT BLD-MCNC: 1.15 MG/DL
EOSINOPHIL # BLD AUTO: 0.12 X10(3) UL (ref 0–0.7)
EOSINOPHIL NFR BLD AUTO: 1.6 %
ERYTHROCYTE [DISTWIDTH] IN BLOOD BY AUTOMATED COUNT: 12.6 %
FASTING STATUS PATIENT QL REPORTED: NO
GFR SERPLBLD BASED ON 1.73 SQ M-ARVRAT: 82 ML/MIN/1.73M2 (ref 60–?)
GLOBULIN PLAS-MCNC: 2.6 G/DL (ref 2.8–4.4)
GLUCOSE BLD-MCNC: 98 MG/DL (ref 70–99)
HCT VFR BLD AUTO: 42.2 %
HGB BLD-MCNC: 14.5 G/DL
IMM GRANULOCYTES # BLD AUTO: 0.03 X10(3) UL (ref 0–1)
IMM GRANULOCYTES NFR BLD: 0.4 %
LDH SERPL L TO P-CCNC: 147 U/L
LYMPHOCYTES # BLD AUTO: 1.82 X10(3) UL (ref 1–4)
LYMPHOCYTES NFR BLD AUTO: 23.7 %
MCH RBC QN AUTO: 30.7 PG (ref 26–34)
MCHC RBC AUTO-ENTMCNC: 34.4 G/DL (ref 31–37)
MCV RBC AUTO: 89.4 FL
MONOCYTES # BLD AUTO: 0.53 X10(3) UL (ref 0.1–1)
MONOCYTES NFR BLD AUTO: 6.9 %
NEUTROPHILS # BLD AUTO: 5.16 X10 (3) UL (ref 1.5–7.7)
NEUTROPHILS # BLD AUTO: 5.16 X10(3) UL (ref 1.5–7.7)
NEUTROPHILS NFR BLD AUTO: 67 %
OSMOLALITY SERPL CALC.SUM OF ELEC: 286 MOSM/KG (ref 275–295)
PLATELET # BLD AUTO: 299 10(3)UL (ref 150–450)
POTASSIUM SERPL-SCNC: 4.4 MMOL/L (ref 3.5–5.1)
PROT SERPL-MCNC: 6.8 G/DL (ref 6.4–8.2)
RBC # BLD AUTO: 4.72 X10(6)UL
SODIUM SERPL-SCNC: 137 MMOL/L (ref 136–145)
WBC # BLD AUTO: 7.7 X10(3) UL (ref 4–11)

## 2022-11-23 PROCEDURE — 99214 OFFICE O/P EST MOD 30 MIN: CPT | Performed by: INTERNAL MEDICINE

## 2022-11-23 NOTE — PROGRESS NOTES
Outpatient Oncology Care Plan   Problem list:   fatigue   Problems related to:   self care   Interventions:   provided general teaching   Expected outcomes:   understands plan of care   Progress towards outcome: making progress     Education Record   Learner: Patient   Barriers / Limitations: None   Method: Discussion   Outcome: Shows understanding   Comments:  Patient here for follow-up. Neuropathy unchanged. Still has right flank pain when dehydrated.

## 2022-11-24 LAB — BETA HCG QUANT (TUMOR MARKER): <1 IU/L

## 2022-12-22 ENCOUNTER — OFFICE VISIT (OUTPATIENT)
Dept: INTERNAL MEDICINE CLINIC | Facility: CLINIC | Age: 42
End: 2022-12-22
Payer: COMMERCIAL

## 2022-12-22 VITALS
HEART RATE: 85 BPM | RESPIRATION RATE: 16 BRPM | BODY MASS INDEX: 31.21 KG/M2 | SYSTOLIC BLOOD PRESSURE: 110 MMHG | HEIGHT: 70 IN | OXYGEN SATURATION: 99 % | DIASTOLIC BLOOD PRESSURE: 70 MMHG | WEIGHT: 218 LBS | TEMPERATURE: 98 F

## 2022-12-22 DIAGNOSIS — Z13.220 LIPID SCREENING: ICD-10-CM

## 2022-12-22 DIAGNOSIS — Z13.29 THYROID DISORDER SCREEN: ICD-10-CM

## 2022-12-22 DIAGNOSIS — Z13.0 SCREENING, IRON DEFICIENCY ANEMIA: ICD-10-CM

## 2022-12-22 DIAGNOSIS — Z23 NEED FOR PNEUMOCOCCAL VACCINATION: ICD-10-CM

## 2022-12-22 DIAGNOSIS — Z13.89 SCREENING FOR GENITOURINARY CONDITION: ICD-10-CM

## 2022-12-22 DIAGNOSIS — Z00.00 ANNUAL PHYSICAL EXAM: Primary | ICD-10-CM

## 2022-12-22 DIAGNOSIS — Z00.00 LABORATORY EXAMINATION ORDERED AS PART OF A ROUTINE GENERAL MEDICAL EXAMINATION: ICD-10-CM

## 2022-12-22 PROBLEM — R10.819 RIGHT FLANK TENDERNESS: Status: RESOLVED | Noted: 2022-03-16 | Resolved: 2022-12-22

## 2022-12-22 PROCEDURE — 3078F DIAST BP <80 MM HG: CPT | Performed by: INTERNAL MEDICINE

## 2022-12-22 PROCEDURE — 99396 PREV VISIT EST AGE 40-64: CPT | Performed by: INTERNAL MEDICINE

## 2022-12-22 PROCEDURE — 3074F SYST BP LT 130 MM HG: CPT | Performed by: INTERNAL MEDICINE

## 2022-12-22 PROCEDURE — 3008F BODY MASS INDEX DOCD: CPT | Performed by: INTERNAL MEDICINE

## 2022-12-22 PROCEDURE — 90471 IMMUNIZATION ADMIN: CPT | Performed by: INTERNAL MEDICINE

## 2022-12-22 PROCEDURE — 90677 PCV20 VACCINE IM: CPT | Performed by: INTERNAL MEDICINE

## 2023-01-07 ENCOUNTER — LAB ENCOUNTER (OUTPATIENT)
Dept: LAB | Facility: HOSPITAL | Age: 43
End: 2023-01-07
Attending: INTERNAL MEDICINE
Payer: COMMERCIAL

## 2023-01-07 DIAGNOSIS — Z13.220 LIPID SCREENING: ICD-10-CM

## 2023-01-07 DIAGNOSIS — Z13.29 THYROID DISORDER SCREEN: ICD-10-CM

## 2023-01-07 DIAGNOSIS — Z13.0 SCREENING, IRON DEFICIENCY ANEMIA: ICD-10-CM

## 2023-01-07 DIAGNOSIS — Z13.89 SCREENING FOR GENITOURINARY CONDITION: ICD-10-CM

## 2023-01-07 DIAGNOSIS — Z00.00 LABORATORY EXAMINATION ORDERED AS PART OF A ROUTINE GENERAL MEDICAL EXAMINATION: ICD-10-CM

## 2023-01-07 LAB
ALBUMIN SERPL-MCNC: 3.7 G/DL (ref 3.4–5)
ALBUMIN/GLOB SERPL: 1.1 {RATIO} (ref 1–2)
ALP LIVER SERPL-CCNC: 57 U/L
ALT SERPL-CCNC: 22 U/L
ANION GAP SERPL CALC-SCNC: 3 MMOL/L (ref 0–18)
AST SERPL-CCNC: 13 U/L (ref 15–37)
BASOPHILS # BLD AUTO: 0.02 X10(3) UL (ref 0–0.2)
BASOPHILS NFR BLD AUTO: 0.3 %
BILIRUB SERPL-MCNC: 0.3 MG/DL (ref 0.1–2)
BILIRUB UR QL STRIP.AUTO: NEGATIVE
BUN BLD-MCNC: 22 MG/DL (ref 7–18)
CALCIUM BLD-MCNC: 8.9 MG/DL (ref 8.5–10.1)
CHLORIDE SERPL-SCNC: 107 MMOL/L (ref 98–112)
CHOLEST SERPL-MCNC: 169 MG/DL (ref ?–200)
CLARITY UR REFRACT.AUTO: CLEAR
CO2 SERPL-SCNC: 29 MMOL/L (ref 21–32)
COLOR UR AUTO: YELLOW
CREAT BLD-MCNC: 1.13 MG/DL
EOSINOPHIL # BLD AUTO: 0.16 X10(3) UL (ref 0–0.7)
EOSINOPHIL NFR BLD AUTO: 2.7 %
ERYTHROCYTE [DISTWIDTH] IN BLOOD BY AUTOMATED COUNT: 13 %
EST. AVERAGE GLUCOSE BLD GHB EST-MCNC: 111 MG/DL (ref 68–126)
FASTING PATIENT LIPID ANSWER: YES
FASTING STATUS PATIENT QL REPORTED: YES
GFR SERPLBLD BASED ON 1.73 SQ M-ARVRAT: 84 ML/MIN/1.73M2 (ref 60–?)
GLOBULIN PLAS-MCNC: 3.4 G/DL (ref 2.8–4.4)
GLUCOSE BLD-MCNC: 100 MG/DL (ref 70–99)
GLUCOSE UR STRIP.AUTO-MCNC: NEGATIVE MG/DL
HBA1C MFR BLD: 5.5 % (ref ?–5.7)
HCT VFR BLD AUTO: 40.7 %
HDLC SERPL-MCNC: 43 MG/DL (ref 40–59)
HGB BLD-MCNC: 13.7 G/DL
IMM GRANULOCYTES # BLD AUTO: 0.01 X10(3) UL (ref 0–1)
IMM GRANULOCYTES NFR BLD: 0.2 %
KETONES UR STRIP.AUTO-MCNC: NEGATIVE MG/DL
LDLC SERPL CALC-MCNC: 110 MG/DL (ref ?–100)
LEUKOCYTE ESTERASE UR QL STRIP.AUTO: NEGATIVE
LYMPHOCYTES # BLD AUTO: 1.66 X10(3) UL (ref 1–4)
LYMPHOCYTES NFR BLD AUTO: 28.1 %
MCH RBC QN AUTO: 30.3 PG (ref 26–34)
MCHC RBC AUTO-ENTMCNC: 33.7 G/DL (ref 31–37)
MCV RBC AUTO: 90 FL
MONOCYTES # BLD AUTO: 0.41 X10(3) UL (ref 0.1–1)
MONOCYTES NFR BLD AUTO: 6.9 %
NEUTROPHILS # BLD AUTO: 3.64 X10 (3) UL (ref 1.5–7.7)
NEUTROPHILS # BLD AUTO: 3.64 X10(3) UL (ref 1.5–7.7)
NEUTROPHILS NFR BLD AUTO: 61.8 %
NITRITE UR QL STRIP.AUTO: NEGATIVE
NONHDLC SERPL-MCNC: 126 MG/DL (ref ?–130)
OSMOLALITY SERPL CALC.SUM OF ELEC: 291 MOSM/KG (ref 275–295)
PH UR STRIP.AUTO: 5 [PH] (ref 5–8)
PLATELET # BLD AUTO: 283 10(3)UL (ref 150–450)
POTASSIUM SERPL-SCNC: 4 MMOL/L (ref 3.5–5.1)
PROT SERPL-MCNC: 7.1 G/DL (ref 6.4–8.2)
PROT UR STRIP.AUTO-MCNC: NEGATIVE MG/DL
RBC # BLD AUTO: 4.52 X10(6)UL
SODIUM SERPL-SCNC: 139 MMOL/L (ref 136–145)
SP GR UR STRIP.AUTO: 1.03 (ref 1–1.03)
TRIGL SERPL-MCNC: 86 MG/DL (ref 30–149)
TSI SER-ACNC: 1.25 MIU/ML (ref 0.36–3.74)
UROBILINOGEN UR STRIP.AUTO-MCNC: <2 MG/DL
VLDLC SERPL CALC-MCNC: 15 MG/DL (ref 0–30)
WBC # BLD AUTO: 5.9 X10(3) UL (ref 4–11)

## 2023-01-07 PROCEDURE — 80053 COMPREHEN METABOLIC PANEL: CPT

## 2023-01-07 PROCEDURE — 80061 LIPID PANEL: CPT

## 2023-01-07 PROCEDURE — 85025 COMPLETE CBC W/AUTO DIFF WBC: CPT

## 2023-01-07 PROCEDURE — 36415 COLL VENOUS BLD VENIPUNCTURE: CPT

## 2023-01-07 PROCEDURE — 83036 HEMOGLOBIN GLYCOSYLATED A1C: CPT

## 2023-01-07 PROCEDURE — 81001 URINALYSIS AUTO W/SCOPE: CPT

## 2023-01-07 PROCEDURE — 84443 ASSAY THYROID STIM HORMONE: CPT

## 2023-02-11 ENCOUNTER — HOSPITAL ENCOUNTER (OUTPATIENT)
Dept: CT IMAGING | Facility: HOSPITAL | Age: 43
Discharge: HOME | End: 2023-02-11
Attending: INTERNAL MEDICINE
Payer: COMMERCIAL

## 2023-02-11 DIAGNOSIS — C62.91 CANCER, TESTIS, SEMINOMA, RIGHT (HCC): ICD-10-CM

## 2023-02-11 PROCEDURE — 71250 CT THORAX DX C-: CPT | Performed by: INTERNAL MEDICINE

## 2023-02-15 ENCOUNTER — OFFICE VISIT (OUTPATIENT)
Dept: HEMATOLOGY/ONCOLOGY | Facility: HOSPITAL | Age: 43
End: 2023-02-15
Attending: INTERNAL MEDICINE
Payer: COMMERCIAL

## 2023-02-15 VITALS
HEIGHT: 70.39 IN | WEIGHT: 219 LBS | RESPIRATION RATE: 18 BRPM | TEMPERATURE: 97 F | DIASTOLIC BLOOD PRESSURE: 84 MMHG | HEART RATE: 91 BPM | BODY MASS INDEX: 31 KG/M2 | SYSTOLIC BLOOD PRESSURE: 128 MMHG | OXYGEN SATURATION: 100 %

## 2023-02-15 DIAGNOSIS — C62.91 SEMINOMA OF RIGHT TESTIS (HCC): ICD-10-CM

## 2023-02-15 DIAGNOSIS — T45.1X5A CHEMOTHERAPY-INDUCED PERIPHERAL NEUROPATHY (HCC): ICD-10-CM

## 2023-02-15 DIAGNOSIS — R91.8 PULMONARY NODULES: ICD-10-CM

## 2023-02-15 DIAGNOSIS — G62.0 CHEMOTHERAPY-INDUCED PERIPHERAL NEUROPATHY (HCC): ICD-10-CM

## 2023-02-15 DIAGNOSIS — C62.91 CANCER, TESTIS, SEMINOMA, RIGHT (HCC): Primary | ICD-10-CM

## 2023-02-15 DIAGNOSIS — H93.13 TINNITUS OF BOTH EARS: ICD-10-CM

## 2023-02-15 LAB
AFP-TM SERPL-MCNC: 1.9 NG/ML (ref ?–8)
ALBUMIN SERPL-MCNC: 4.1 G/DL (ref 3.4–5)
ALBUMIN/GLOB SERPL: 1.2 {RATIO} (ref 1–2)
ALP LIVER SERPL-CCNC: 63 U/L
ALT SERPL-CCNC: 24 U/L
ANION GAP SERPL CALC-SCNC: 5 MMOL/L (ref 0–18)
AST SERPL-CCNC: 15 U/L (ref 15–37)
BASOPHILS # BLD AUTO: 0.03 X10(3) UL (ref 0–0.2)
BASOPHILS NFR BLD AUTO: 0.4 %
BILIRUB SERPL-MCNC: 0.3 MG/DL (ref 0.1–2)
BUN BLD-MCNC: 18 MG/DL (ref 7–18)
CALCIUM BLD-MCNC: 9.1 MG/DL (ref 8.5–10.1)
CHLORIDE SERPL-SCNC: 106 MMOL/L (ref 98–112)
CO2 SERPL-SCNC: 28 MMOL/L (ref 21–32)
CREAT BLD-MCNC: 1.09 MG/DL
EOSINOPHIL # BLD AUTO: 0.14 X10(3) UL (ref 0–0.7)
EOSINOPHIL NFR BLD AUTO: 1.8 %
ERYTHROCYTE [DISTWIDTH] IN BLOOD BY AUTOMATED COUNT: 12.4 %
FASTING STATUS PATIENT QL REPORTED: NO
GFR SERPLBLD BASED ON 1.73 SQ M-ARVRAT: 87 ML/MIN/1.73M2 (ref 60–?)
GLOBULIN PLAS-MCNC: 3.4 G/DL (ref 2.8–4.4)
GLUCOSE BLD-MCNC: 74 MG/DL (ref 70–99)
HCT VFR BLD AUTO: 42.2 %
HGB BLD-MCNC: 14.5 G/DL
IMM GRANULOCYTES # BLD AUTO: 0.01 X10(3) UL (ref 0–1)
IMM GRANULOCYTES NFR BLD: 0.1 %
LDH SERPL L TO P-CCNC: 135 U/L
LYMPHOCYTES # BLD AUTO: 2.13 X10(3) UL (ref 1–4)
LYMPHOCYTES NFR BLD AUTO: 27 %
MCH RBC QN AUTO: 30.5 PG (ref 26–34)
MCHC RBC AUTO-ENTMCNC: 34.4 G/DL (ref 31–37)
MCV RBC AUTO: 88.8 FL
MONOCYTES # BLD AUTO: 0.58 X10(3) UL (ref 0.1–1)
MONOCYTES NFR BLD AUTO: 7.4 %
NEUTROPHILS # BLD AUTO: 5 X10 (3) UL (ref 1.5–7.7)
NEUTROPHILS # BLD AUTO: 5 X10(3) UL (ref 1.5–7.7)
NEUTROPHILS NFR BLD AUTO: 63.3 %
OSMOLALITY SERPL CALC.SUM OF ELEC: 289 MOSM/KG (ref 275–295)
PLATELET # BLD AUTO: 300 10(3)UL (ref 150–450)
POTASSIUM SERPL-SCNC: 3.9 MMOL/L (ref 3.5–5.1)
PROT SERPL-MCNC: 7.5 G/DL (ref 6.4–8.2)
RBC # BLD AUTO: 4.75 X10(6)UL
SODIUM SERPL-SCNC: 139 MMOL/L (ref 136–145)
WBC # BLD AUTO: 7.9 X10(3) UL (ref 4–11)

## 2023-02-15 PROCEDURE — 99215 OFFICE O/P EST HI 40 MIN: CPT | Performed by: INTERNAL MEDICINE

## 2023-02-16 LAB — BETA HCG QUANT (TUMOR MARKER): <1 IU/L

## 2023-05-13 ENCOUNTER — HOSPITAL ENCOUNTER (OUTPATIENT)
Dept: CT IMAGING | Facility: HOSPITAL | Age: 43
Discharge: HOME | End: 2023-05-13
Attending: INTERNAL MEDICINE
Payer: COMMERCIAL

## 2023-05-13 DIAGNOSIS — C62.91 CANCER, TESTIS, SEMINOMA, RIGHT (HCC): ICD-10-CM

## 2023-05-13 PROCEDURE — 74176 CT ABD & PELVIS W/O CONTRAST: CPT | Performed by: INTERNAL MEDICINE

## 2023-05-13 PROCEDURE — 71250 CT THORAX DX C-: CPT | Performed by: INTERNAL MEDICINE

## 2023-05-17 ENCOUNTER — OFFICE VISIT (OUTPATIENT)
Dept: HEMATOLOGY/ONCOLOGY | Facility: HOSPITAL | Age: 43
End: 2023-05-17
Attending: INTERNAL MEDICINE
Payer: COMMERCIAL

## 2023-05-17 VITALS
DIASTOLIC BLOOD PRESSURE: 82 MMHG | HEART RATE: 86 BPM | HEIGHT: 70.39 IN | TEMPERATURE: 98 F | RESPIRATION RATE: 16 BRPM | OXYGEN SATURATION: 99 % | SYSTOLIC BLOOD PRESSURE: 122 MMHG | WEIGHT: 215.81 LBS | BODY MASS INDEX: 30.55 KG/M2

## 2023-05-17 DIAGNOSIS — G62.0 CHEMOTHERAPY-INDUCED PERIPHERAL NEUROPATHY (HCC): ICD-10-CM

## 2023-05-17 DIAGNOSIS — R91.8 PULMONARY NODULES: ICD-10-CM

## 2023-05-17 DIAGNOSIS — T45.1X5A CHEMOTHERAPY-INDUCED PERIPHERAL NEUROPATHY (HCC): ICD-10-CM

## 2023-05-17 DIAGNOSIS — C62.91 SEMINOMA OF RIGHT TESTIS (HCC): ICD-10-CM

## 2023-05-17 DIAGNOSIS — C62.91 CANCER, TESTIS, SEMINOMA, RIGHT (HCC): Primary | ICD-10-CM

## 2023-05-17 LAB
AFP-TM SERPL-MCNC: 1.7 NG/ML (ref ?–8)
ALBUMIN SERPL-MCNC: 4.3 G/DL (ref 3.4–5)
ALBUMIN/GLOB SERPL: 1.3 {RATIO} (ref 1–2)
ALP LIVER SERPL-CCNC: 62 U/L
ALT SERPL-CCNC: 28 U/L
ANION GAP SERPL CALC-SCNC: 3 MMOL/L (ref 0–18)
AST SERPL-CCNC: 16 U/L (ref 15–37)
BASOPHILS # BLD AUTO: 0.03 X10(3) UL (ref 0–0.2)
BASOPHILS NFR BLD AUTO: 0.4 %
BILIRUB SERPL-MCNC: 0.4 MG/DL (ref 0.1–2)
BUN BLD-MCNC: 19 MG/DL (ref 7–18)
CALCIUM BLD-MCNC: 9.4 MG/DL (ref 8.5–10.1)
CHLORIDE SERPL-SCNC: 106 MMOL/L (ref 98–112)
CO2 SERPL-SCNC: 29 MMOL/L (ref 21–32)
CREAT BLD-MCNC: 1.2 MG/DL
EOSINOPHIL # BLD AUTO: 0.11 X10(3) UL (ref 0–0.7)
EOSINOPHIL NFR BLD AUTO: 1.5 %
ERYTHROCYTE [DISTWIDTH] IN BLOOD BY AUTOMATED COUNT: 12.9 %
GFR SERPLBLD BASED ON 1.73 SQ M-ARVRAT: 78 ML/MIN/1.73M2 (ref 60–?)
GLOBULIN PLAS-MCNC: 3.3 G/DL (ref 2.8–4.4)
GLUCOSE BLD-MCNC: 74 MG/DL (ref 70–99)
HCT VFR BLD AUTO: 44.6 %
HGB BLD-MCNC: 14.9 G/DL
IMM GRANULOCYTES # BLD AUTO: 0.02 X10(3) UL (ref 0–1)
IMM GRANULOCYTES NFR BLD: 0.3 %
LDH SERPL L TO P-CCNC: 136 U/L
LYMPHOCYTES # BLD AUTO: 1.71 X10(3) UL (ref 1–4)
LYMPHOCYTES NFR BLD AUTO: 22.8 %
MCH RBC QN AUTO: 29.7 PG (ref 26–34)
MCHC RBC AUTO-ENTMCNC: 33.4 G/DL (ref 31–37)
MCV RBC AUTO: 89 FL
MONOCYTES # BLD AUTO: 0.53 X10(3) UL (ref 0.1–1)
MONOCYTES NFR BLD AUTO: 7.1 %
NEUTROPHILS # BLD AUTO: 5.1 X10 (3) UL (ref 1.5–7.7)
NEUTROPHILS # BLD AUTO: 5.1 X10(3) UL (ref 1.5–7.7)
NEUTROPHILS NFR BLD AUTO: 67.9 %
OSMOLALITY SERPL CALC.SUM OF ELEC: 287 MOSM/KG (ref 275–295)
PLATELET # BLD AUTO: 285 10(3)UL (ref 150–450)
POTASSIUM SERPL-SCNC: 4.2 MMOL/L (ref 3.5–5.1)
PROT SERPL-MCNC: 7.6 G/DL (ref 6.4–8.2)
RBC # BLD AUTO: 5.01 X10(6)UL
SODIUM SERPL-SCNC: 138 MMOL/L (ref 136–145)
WBC # BLD AUTO: 7.5 X10(3) UL (ref 4–11)

## 2023-05-17 PROCEDURE — 99214 OFFICE O/P EST MOD 30 MIN: CPT | Performed by: INTERNAL MEDICINE

## 2023-05-18 LAB — HCG BETA SUBUNIT TUMOR MARKER QN: <1 MIU/ML

## 2023-06-06 ENCOUNTER — APPOINTMENT (OUTPATIENT)
Dept: CT IMAGING | Facility: HOSPITAL | Age: 43
End: 2023-06-06
Attending: EMERGENCY MEDICINE
Payer: COMMERCIAL

## 2023-06-06 ENCOUNTER — HOSPITAL ENCOUNTER (EMERGENCY)
Facility: HOSPITAL | Age: 43
Discharge: HOME | End: 2023-06-07
Attending: EMERGENCY MEDICINE
Payer: COMMERCIAL

## 2023-06-06 ENCOUNTER — APPOINTMENT (OUTPATIENT)
Dept: GENERAL RADIOLOGY | Facility: HOSPITAL | Age: 43
End: 2023-06-06
Attending: EMERGENCY MEDICINE
Payer: COMMERCIAL

## 2023-06-06 DIAGNOSIS — R10.9 ABDOMINAL CRAMPING: ICD-10-CM

## 2023-06-06 DIAGNOSIS — D72.829 LEUKOCYTOSIS, UNSPECIFIED TYPE: Primary | ICD-10-CM

## 2023-06-06 LAB
ALBUMIN SERPL-MCNC: 4.6 G/DL (ref 3.4–5)
ALBUMIN/GLOB SERPL: 1.4 {RATIO} (ref 1–2)
ALP LIVER SERPL-CCNC: 56 U/L
ALT SERPL-CCNC: 25 U/L
ANION GAP SERPL CALC-SCNC: 6 MMOL/L (ref 0–18)
AST SERPL-CCNC: 11 U/L (ref 15–37)
BASOPHILS # BLD AUTO: 0.04 X10(3) UL (ref 0–0.2)
BASOPHILS NFR BLD AUTO: 0.3 %
BILIRUB SERPL-MCNC: 0.7 MG/DL (ref 0.1–2)
BUN BLD-MCNC: 22 MG/DL (ref 7–18)
CALCIUM BLD-MCNC: 9.5 MG/DL (ref 8.5–10.1)
CHLORIDE SERPL-SCNC: 103 MMOL/L (ref 98–112)
CO2 SERPL-SCNC: 26 MMOL/L (ref 21–32)
CREAT BLD-MCNC: 1.35 MG/DL
EOSINOPHIL # BLD AUTO: 0.11 X10(3) UL (ref 0–0.7)
EOSINOPHIL NFR BLD AUTO: 0.7 %
ERYTHROCYTE [DISTWIDTH] IN BLOOD BY AUTOMATED COUNT: 12.7 %
GFR SERPLBLD BASED ON 1.73 SQ M-ARVRAT: 68 ML/MIN/1.73M2 (ref 60–?)
GLOBULIN PLAS-MCNC: 3.3 G/DL (ref 2.8–4.4)
GLUCOSE BLD-MCNC: 135 MG/DL (ref 70–99)
HCT VFR BLD AUTO: 46.3 %
HGB BLD-MCNC: 15.8 G/DL
IMM GRANULOCYTES # BLD AUTO: 0.05 X10(3) UL (ref 0–1)
IMM GRANULOCYTES NFR BLD: 0.3 %
LIPASE SERPL-CCNC: 20 U/L (ref 13–75)
LYMPHOCYTES # BLD AUTO: 0.81 X10(3) UL (ref 1–4)
LYMPHOCYTES NFR BLD AUTO: 5.2 %
MCH RBC QN AUTO: 29.8 PG (ref 26–34)
MCHC RBC AUTO-ENTMCNC: 34.1 G/DL (ref 31–37)
MCV RBC AUTO: 87.2 FL
MONOCYTES # BLD AUTO: 0.64 X10(3) UL (ref 0.1–1)
MONOCYTES NFR BLD AUTO: 4.1 %
NEUTROPHILS # BLD AUTO: 13.88 X10 (3) UL (ref 1.5–7.7)
NEUTROPHILS # BLD AUTO: 13.88 X10(3) UL (ref 1.5–7.7)
NEUTROPHILS NFR BLD AUTO: 89.4 %
OSMOLALITY SERPL CALC.SUM OF ELEC: 285 MOSM/KG (ref 275–295)
PLATELET # BLD AUTO: 319 10(3)UL (ref 150–450)
POTASSIUM SERPL-SCNC: 3.7 MMOL/L (ref 3.5–5.1)
PROT SERPL-MCNC: 7.9 G/DL (ref 6.4–8.2)
RBC # BLD AUTO: 5.31 X10(6)UL
SODIUM SERPL-SCNC: 135 MMOL/L (ref 136–145)
TROPONIN I HIGH SENSITIVITY: <3 NG/L
WBC # BLD AUTO: 15.5 X10(3) UL (ref 4–11)

## 2023-06-06 PROCEDURE — 85025 COMPLETE CBC W/AUTO DIFF WBC: CPT

## 2023-06-06 PROCEDURE — 83690 ASSAY OF LIPASE: CPT | Performed by: EMERGENCY MEDICINE

## 2023-06-06 PROCEDURE — 74176 CT ABD & PELVIS W/O CONTRAST: CPT | Performed by: EMERGENCY MEDICINE

## 2023-06-06 PROCEDURE — 93005 ELECTROCARDIOGRAM TRACING: CPT

## 2023-06-06 PROCEDURE — 80053 COMPREHEN METABOLIC PANEL: CPT | Performed by: EMERGENCY MEDICINE

## 2023-06-06 PROCEDURE — 85025 COMPLETE CBC W/AUTO DIFF WBC: CPT | Performed by: EMERGENCY MEDICINE

## 2023-06-06 PROCEDURE — 71045 X-RAY EXAM CHEST 1 VIEW: CPT | Performed by: EMERGENCY MEDICINE

## 2023-06-06 PROCEDURE — 99285 EMERGENCY DEPT VISIT HI MDM: CPT

## 2023-06-06 PROCEDURE — 96361 HYDRATE IV INFUSION ADD-ON: CPT

## 2023-06-06 PROCEDURE — 96374 THER/PROPH/DIAG INJ IV PUSH: CPT

## 2023-06-06 PROCEDURE — 80053 COMPREHEN METABOLIC PANEL: CPT

## 2023-06-06 PROCEDURE — 84484 ASSAY OF TROPONIN QUANT: CPT | Performed by: EMERGENCY MEDICINE

## 2023-06-06 PROCEDURE — 93010 ELECTROCARDIOGRAM REPORT: CPT

## 2023-06-06 PROCEDURE — 83690 ASSAY OF LIPASE: CPT

## 2023-06-06 RX ORDER — METOCLOPRAMIDE HYDROCHLORIDE 5 MG/ML
5 INJECTION INTRAMUSCULAR; INTRAVENOUS ONCE
Status: COMPLETED | OUTPATIENT
Start: 2023-06-06 | End: 2023-06-06

## 2023-06-07 VITALS
OXYGEN SATURATION: 100 % | WEIGHT: 215 LBS | TEMPERATURE: 98 F | HEART RATE: 110 BPM | HEIGHT: 72 IN | RESPIRATION RATE: 21 BRPM | SYSTOLIC BLOOD PRESSURE: 136 MMHG | DIASTOLIC BLOOD PRESSURE: 72 MMHG | BODY MASS INDEX: 29.12 KG/M2

## 2023-06-07 VITALS
RESPIRATION RATE: 23 BRPM | HEIGHT: 70 IN | OXYGEN SATURATION: 100 % | SYSTOLIC BLOOD PRESSURE: 126 MMHG | HEART RATE: 106 BPM | TEMPERATURE: 98 F | WEIGHT: 215 LBS | DIASTOLIC BLOOD PRESSURE: 88 MMHG | BODY MASS INDEX: 30.78 KG/M2

## 2023-06-07 DIAGNOSIS — R11.2 NAUSEA AND VOMITING, UNSPECIFIED VOMITING TYPE: ICD-10-CM

## 2023-06-07 DIAGNOSIS — R00.0 SINUS TACHYCARDIA: ICD-10-CM

## 2023-06-07 DIAGNOSIS — E83.42 HYPOMAGNESEMIA: ICD-10-CM

## 2023-06-07 DIAGNOSIS — J02.0 ACUTE STREPTOCOCCAL PHARYNGITIS: ICD-10-CM

## 2023-06-07 DIAGNOSIS — F41.9 ANXIETY: ICD-10-CM

## 2023-06-07 DIAGNOSIS — D72.829 LEUKOCYTOSIS, UNSPECIFIED TYPE: Primary | ICD-10-CM

## 2023-06-07 LAB
ATRIAL RATE: 95 BPM
BILIRUB UR QL STRIP.AUTO: NEGATIVE
CLARITY UR REFRACT.AUTO: CLEAR
COLOR UR AUTO: YELLOW
GLUCOSE UR STRIP.AUTO-MCNC: NEGATIVE MG/DL
HYALINE CASTS #/AREA URNS AUTO: PRESENT /LPF
KETONES UR STRIP.AUTO-MCNC: 20 MG/DL
LEUKOCYTE ESTERASE UR QL STRIP.AUTO: NEGATIVE
MAGNESIUM SERPL-MCNC: 1.5 MG/DL (ref 1.6–2.6)
NITRITE UR QL STRIP.AUTO: NEGATIVE
P AXIS: 22 DEGREES
P-R INTERVAL: 176 MS
PH UR STRIP.AUTO: 5 [PH] (ref 5–8)
PROT UR STRIP.AUTO-MCNC: 30 MG/DL
Q-T INTERVAL: 360 MS
QRS DURATION: 96 MS
QTC CALCULATION (BEZET): 452 MS
R AXIS: 2 DEGREES
RBC #/AREA URNS AUTO: >10 /HPF
RBC UR QL AUTO: NEGATIVE
SP GR UR STRIP.AUTO: 1.03 (ref 1–1.03)
T AXIS: 91 DEGREES
UROBILINOGEN UR STRIP.AUTO-MCNC: <2 MG/DL
VENTRICULAR RATE: 95 BPM

## 2023-06-07 PROCEDURE — 83735 ASSAY OF MAGNESIUM: CPT | Performed by: EMERGENCY MEDICINE

## 2023-06-07 PROCEDURE — S0119 ONDANSETRON 4 MG: HCPCS | Performed by: EMERGENCY MEDICINE

## 2023-06-07 PROCEDURE — 96365 THER/PROPH/DIAG IV INF INIT: CPT

## 2023-06-07 PROCEDURE — 99284 EMERGENCY DEPT VISIT MOD MDM: CPT

## 2023-06-07 PROCEDURE — 96375 TX/PRO/DX INJ NEW DRUG ADDON: CPT

## 2023-06-07 PROCEDURE — 81001 URINALYSIS AUTO W/SCOPE: CPT | Performed by: EMERGENCY MEDICINE

## 2023-06-07 PROCEDURE — 87430 STREP A AG IA: CPT | Performed by: EMERGENCY MEDICINE

## 2023-06-07 RX ORDER — ONDANSETRON 4 MG/1
4 TABLET, ORALLY DISINTEGRATING ORAL EVERY 4 HOURS PRN
Qty: 10 TABLET | Refills: 0 | Status: SHIPPED | OUTPATIENT
Start: 2023-06-07 | End: 2023-06-14

## 2023-06-07 RX ORDER — AMOXICILLIN 500 MG/1
500 TABLET, FILM COATED ORAL 3 TIMES DAILY
Qty: 30 TABLET | Refills: 0 | Status: SHIPPED | OUTPATIENT
Start: 2023-06-07 | End: 2023-06-08

## 2023-06-07 RX ORDER — ONDANSETRON 4 MG/1
4 TABLET, ORALLY DISINTEGRATING ORAL ONCE
Status: COMPLETED | OUTPATIENT
Start: 2023-06-07 | End: 2023-06-07

## 2023-06-07 RX ORDER — METOCLOPRAMIDE HYDROCHLORIDE 5 MG/ML
5 INJECTION INTRAMUSCULAR; INTRAVENOUS ONCE
Status: COMPLETED | OUTPATIENT
Start: 2023-06-07 | End: 2023-06-07

## 2023-06-07 RX ORDER — DIPHENHYDRAMINE HCL 25 MG
25 CAPSULE ORAL EVERY 8 HOURS PRN
Qty: 30 CAPSULE | Refills: 0 | Status: SHIPPED | OUTPATIENT
Start: 2023-06-07

## 2023-06-07 RX ORDER — PROCHLORPERAZINE 25 MG
25 SUPPOSITORY, RECTAL RECTAL EVERY 12 HOURS PRN
Qty: 12 SUPPOSITORY | Refills: 0 | Status: SHIPPED | OUTPATIENT
Start: 2023-06-07

## 2023-06-07 RX ORDER — DICYCLOMINE HCL 20 MG
20 TABLET ORAL 4 TIMES DAILY PRN
Qty: 30 TABLET | Refills: 0 | Status: SHIPPED | OUTPATIENT
Start: 2023-06-07 | End: 2023-07-07

## 2023-06-07 RX ORDER — DIPHENHYDRAMINE HYDROCHLORIDE 50 MG/ML
25 INJECTION INTRAMUSCULAR; INTRAVENOUS ONCE
Status: COMPLETED | OUTPATIENT
Start: 2023-06-07 | End: 2023-06-07

## 2023-06-07 RX ORDER — LORAZEPAM 2 MG/ML
1 INJECTION INTRAMUSCULAR ONCE
Status: COMPLETED | OUTPATIENT
Start: 2023-06-07 | End: 2023-06-07

## 2023-06-07 RX ORDER — CYCLOBENZAPRINE HCL 10 MG
10 TABLET ORAL 3 TIMES DAILY PRN
Qty: 20 TABLET | Refills: 0 | Status: SHIPPED | OUTPATIENT
Start: 2023-06-07 | End: 2023-06-14

## 2023-06-07 RX ORDER — PROPRANOLOL HYDROCHLORIDE 10 MG/1
10 TABLET ORAL 3 TIMES DAILY PRN
Qty: 60 TABLET | Refills: 0 | Status: SHIPPED | OUTPATIENT
Start: 2023-06-07

## 2023-06-07 RX ORDER — METOCLOPRAMIDE 10 MG/1
10 TABLET ORAL 3 TIMES DAILY PRN
Qty: 20 TABLET | Refills: 0 | Status: SHIPPED | OUTPATIENT
Start: 2023-06-07 | End: 2023-07-07

## 2023-06-07 RX ORDER — MAGNESIUM SULFATE 1 G/100ML
1 INJECTION INTRAVENOUS ONCE
Status: COMPLETED | OUTPATIENT
Start: 2023-06-07 | End: 2023-06-07

## 2023-06-07 NOTE — DISCHARGE INSTRUCTIONS
Speak with your primary care physician about antianxiety medication treatment such as Lexapro or Zoloft or propranolol

## 2023-06-07 NOTE — ED INITIAL ASSESSMENT (HPI)
Here for lower abdominal pain, nausea, and vomiting. Symptoms started today around 7pm. Patient states he also feels dizzy and weak.

## 2023-06-08 ENCOUNTER — LAB ENCOUNTER (OUTPATIENT)
Dept: LAB | Age: 43
End: 2023-06-08
Attending: INTERNAL MEDICINE
Payer: COMMERCIAL

## 2023-06-08 ENCOUNTER — OFFICE VISIT (OUTPATIENT)
Dept: INTERNAL MEDICINE CLINIC | Facility: CLINIC | Age: 43
End: 2023-06-08
Payer: COMMERCIAL

## 2023-06-08 VITALS
OXYGEN SATURATION: 99 % | WEIGHT: 216 LBS | SYSTOLIC BLOOD PRESSURE: 118 MMHG | TEMPERATURE: 98 F | HEIGHT: 72 IN | HEART RATE: 88 BPM | RESPIRATION RATE: 16 BRPM | BODY MASS INDEX: 29.26 KG/M2 | DIASTOLIC BLOOD PRESSURE: 72 MMHG

## 2023-06-08 DIAGNOSIS — D72.829 LEUKOCYTOSIS, UNSPECIFIED TYPE: ICD-10-CM

## 2023-06-08 DIAGNOSIS — R31.21 ASYMPTOMATIC MICROSCOPIC HEMATURIA: ICD-10-CM

## 2023-06-08 DIAGNOSIS — A08.4 VIRAL GASTROENTERITIS: Primary | ICD-10-CM

## 2023-06-08 DIAGNOSIS — J02.0 STREP PHARYNGITIS: ICD-10-CM

## 2023-06-08 PROBLEM — D64.9 NORMOCYTIC ANEMIA: Status: RESOLVED | Noted: 2022-03-16 | Resolved: 2023-06-08

## 2023-06-08 PROBLEM — H93.13 TINNITUS OF BOTH EARS: Status: RESOLVED | Noted: 2022-08-17 | Resolved: 2023-06-08

## 2023-06-08 LAB
BASOPHILS # BLD AUTO: 0.02 X10(3) UL (ref 0–0.2)
BASOPHILS NFR BLD AUTO: 0.3 %
BILIRUB UR QL STRIP.AUTO: NEGATIVE
CLARITY UR REFRACT.AUTO: CLEAR
COLOR UR AUTO: YELLOW
EOSINOPHIL # BLD AUTO: 0.05 X10(3) UL (ref 0–0.7)
EOSINOPHIL NFR BLD AUTO: 0.7 %
ERYTHROCYTE [DISTWIDTH] IN BLOOD BY AUTOMATED COUNT: 13 %
GLUCOSE UR STRIP.AUTO-MCNC: NEGATIVE MG/DL
HCT VFR BLD AUTO: 40.4 %
HGB BLD-MCNC: 13.6 G/DL
IMM GRANULOCYTES # BLD AUTO: 0.02 X10(3) UL (ref 0–1)
IMM GRANULOCYTES NFR BLD: 0.3 %
KETONES UR STRIP.AUTO-MCNC: NEGATIVE MG/DL
LEUKOCYTE ESTERASE UR QL STRIP.AUTO: NEGATIVE
LYMPHOCYTES # BLD AUTO: 1.09 X10(3) UL (ref 1–4)
LYMPHOCYTES NFR BLD AUTO: 16.3 %
MCH RBC QN AUTO: 30 PG (ref 26–34)
MCHC RBC AUTO-ENTMCNC: 33.7 G/DL (ref 31–37)
MCV RBC AUTO: 89 FL
MONOCYTES # BLD AUTO: 0.75 X10(3) UL (ref 0.1–1)
MONOCYTES NFR BLD AUTO: 11.2 %
NEUTROPHILS # BLD AUTO: 4.77 X10 (3) UL (ref 1.5–7.7)
NEUTROPHILS # BLD AUTO: 4.77 X10(3) UL (ref 1.5–7.7)
NEUTROPHILS NFR BLD AUTO: 71.2 %
NITRITE UR QL STRIP.AUTO: NEGATIVE
PH UR STRIP.AUTO: 5 [PH] (ref 5–8)
PLATELET # BLD AUTO: 241 10(3)UL (ref 150–450)
PROT UR STRIP.AUTO-MCNC: NEGATIVE MG/DL
RBC # BLD AUTO: 4.54 X10(6)UL
RBC #/AREA URNS AUTO: >10 /HPF
SP GR UR STRIP.AUTO: 1.02 (ref 1–1.03)
UROBILINOGEN UR STRIP.AUTO-MCNC: 2 MG/DL
WBC # BLD AUTO: 6.7 X10(3) UL (ref 4–11)

## 2023-06-08 PROCEDURE — 99214 OFFICE O/P EST MOD 30 MIN: CPT | Performed by: INTERNAL MEDICINE

## 2023-06-08 PROCEDURE — 3008F BODY MASS INDEX DOCD: CPT | Performed by: INTERNAL MEDICINE

## 2023-06-08 PROCEDURE — 3074F SYST BP LT 130 MM HG: CPT | Performed by: INTERNAL MEDICINE

## 2023-06-08 PROCEDURE — 81001 URINALYSIS AUTO W/SCOPE: CPT | Performed by: INTERNAL MEDICINE

## 2023-06-08 PROCEDURE — 3078F DIAST BP <80 MM HG: CPT | Performed by: INTERNAL MEDICINE

## 2023-06-08 PROCEDURE — 85025 COMPLETE CBC W/AUTO DIFF WBC: CPT | Performed by: INTERNAL MEDICINE

## 2023-06-09 DIAGNOSIS — R31.9 HEMATURIA, UNSPECIFIED TYPE: Primary | ICD-10-CM

## 2023-06-09 DIAGNOSIS — T78.40XA ALLERGY, INITIAL ENCOUNTER: ICD-10-CM

## 2023-06-09 RX ORDER — PREDNISONE 50 MG/1
TABLET ORAL
Qty: 3 TABLET | Refills: 0 | Status: SHIPPED | OUTPATIENT
Start: 2023-06-09

## 2023-06-22 ENCOUNTER — HOSPITAL ENCOUNTER (OUTPATIENT)
Dept: CT IMAGING | Facility: HOSPITAL | Age: 43
Discharge: HOME | End: 2023-06-22
Attending: INTERNAL MEDICINE
Payer: COMMERCIAL

## 2023-06-22 DIAGNOSIS — R31.9 HEMATURIA, UNSPECIFIED TYPE: ICD-10-CM

## 2023-06-22 PROCEDURE — 74178 CT ABD&PLV WO CNTR FLWD CNTR: CPT | Performed by: INTERNAL MEDICINE

## 2023-06-22 PROCEDURE — 76377 3D RENDER W/INTRP POSTPROCES: CPT | Performed by: INTERNAL MEDICINE

## 2023-06-22 NOTE — IMAGING NOTE
Patient has documented iodine contrast allergy. Patient completed 13 hour allergy prep at home. Patient reported itching eyes and sneezed x1 after contrast administration. VSS   ST; O2sat 97%; /78 RR 20. Patient examined for rash and hives. No hives, no rash, patient reports improvement of symptoms. Discussed with DR Uyen Ramsay. OK to discharge home. Patient advised to return to ED if worsening of symptoms.

## 2023-08-11 ENCOUNTER — OFFICE VISIT (OUTPATIENT)
Dept: INTERNAL MEDICINE CLINIC | Facility: CLINIC | Age: 43
End: 2023-08-11
Payer: COMMERCIAL

## 2023-08-11 VITALS
RESPIRATION RATE: 16 BRPM | HEIGHT: 73 IN | OXYGEN SATURATION: 98 % | HEART RATE: 78 BPM | WEIGHT: 205 LBS | DIASTOLIC BLOOD PRESSURE: 78 MMHG | SYSTOLIC BLOOD PRESSURE: 118 MMHG | TEMPERATURE: 98 F | BODY MASS INDEX: 27.17 KG/M2

## 2023-08-11 DIAGNOSIS — M27.40 CYST OF MANDIBLE: ICD-10-CM

## 2023-08-11 DIAGNOSIS — Z91.89 10 YEAR RISK OF MI OR STROKE < 7.5%: ICD-10-CM

## 2023-08-11 DIAGNOSIS — Z01.810 PREOP CARDIOVASCULAR EXAM: Primary | ICD-10-CM

## 2023-08-11 PROCEDURE — 3008F BODY MASS INDEX DOCD: CPT | Performed by: INTERNAL MEDICINE

## 2023-08-11 PROCEDURE — 3078F DIAST BP <80 MM HG: CPT | Performed by: INTERNAL MEDICINE

## 2023-08-11 PROCEDURE — 99214 OFFICE O/P EST MOD 30 MIN: CPT | Performed by: INTERNAL MEDICINE

## 2023-08-11 PROCEDURE — 3074F SYST BP LT 130 MM HG: CPT | Performed by: INTERNAL MEDICINE

## 2023-09-05 ENCOUNTER — TELEPHONE (OUTPATIENT)
Dept: INTERNAL MEDICINE CLINIC | Facility: CLINIC | Age: 43
End: 2023-09-05

## 2023-09-12 ENCOUNTER — OFFICE VISIT (OUTPATIENT)
Dept: INTERNAL MEDICINE CLINIC | Facility: CLINIC | Age: 43
End: 2023-09-12
Payer: COMMERCIAL

## 2023-09-12 ENCOUNTER — ANESTHESIA EVENT (OUTPATIENT)
Dept: SURGERY | Facility: HOSPITAL | Age: 43
End: 2023-09-12
Payer: COMMERCIAL

## 2023-09-12 VITALS
BODY MASS INDEX: 29.78 KG/M2 | WEIGHT: 208 LBS | HEIGHT: 70 IN | OXYGEN SATURATION: 98 % | HEART RATE: 78 BPM | DIASTOLIC BLOOD PRESSURE: 80 MMHG | SYSTOLIC BLOOD PRESSURE: 120 MMHG | RESPIRATION RATE: 18 BRPM | TEMPERATURE: 98 F

## 2023-09-12 DIAGNOSIS — Z01.818 PREOP EXAM FOR INTERNAL MEDICINE: Primary | ICD-10-CM

## 2023-09-12 DIAGNOSIS — M27.40 CYST OF MANDIBLE: ICD-10-CM

## 2023-09-12 DIAGNOSIS — Z91.89 10 YEAR RISK OF MI OR STROKE < 7.5%: ICD-10-CM

## 2023-09-12 PROCEDURE — 3008F BODY MASS INDEX DOCD: CPT

## 2023-09-12 PROCEDURE — 3074F SYST BP LT 130 MM HG: CPT

## 2023-09-12 PROCEDURE — 99213 OFFICE O/P EST LOW 20 MIN: CPT

## 2023-09-12 PROCEDURE — 3079F DIAST BP 80-89 MM HG: CPT

## 2023-09-13 ENCOUNTER — HOSPITAL ENCOUNTER (OUTPATIENT)
Facility: HOSPITAL | Age: 43
Setting detail: HOSPITAL OUTPATIENT SURGERY
Discharge: HOME | End: 2023-09-13
Attending: DENTIST | Admitting: DENTIST
Payer: COMMERCIAL

## 2023-09-13 ENCOUNTER — ANESTHESIA (OUTPATIENT)
Dept: SURGERY | Facility: HOSPITAL | Age: 43
End: 2023-09-13
Payer: COMMERCIAL

## 2023-09-13 VITALS
DIASTOLIC BLOOD PRESSURE: 90 MMHG | HEIGHT: 70 IN | RESPIRATION RATE: 16 BRPM | WEIGHT: 208 LBS | TEMPERATURE: 99 F | BODY MASS INDEX: 29.78 KG/M2 | HEART RATE: 93 BPM | SYSTOLIC BLOOD PRESSURE: 140 MMHG | OXYGEN SATURATION: 98 %

## 2023-09-13 PROCEDURE — 0WB3XZZ EXCISION OF ORAL CAVITY AND THROAT, EXTERNAL APPROACH: ICD-10-PCS | Performed by: DENTIST

## 2023-09-13 PROCEDURE — 88305 TISSUE EXAM BY PATHOLOGIST: CPT | Performed by: DENTIST

## 2023-09-13 PROCEDURE — 88300 SURGICAL PATH GROSS: CPT | Performed by: DENTIST

## 2023-09-13 PROCEDURE — 0CTX0Z1 RESECTION OF LOWER TOOTH, MULTIPLE, OPEN APPROACH: ICD-10-PCS | Performed by: DENTIST

## 2023-09-13 PROCEDURE — S0119 ONDANSETRON 4 MG: HCPCS

## 2023-09-13 RX ORDER — SODIUM CHLORIDE, SODIUM LACTATE, POTASSIUM CHLORIDE, CALCIUM CHLORIDE 600; 310; 30; 20 MG/100ML; MG/100ML; MG/100ML; MG/100ML
INJECTION, SOLUTION INTRAVENOUS CONTINUOUS
Status: DISCONTINUED | OUTPATIENT
Start: 2023-09-13 | End: 2023-09-14

## 2023-09-13 RX ORDER — HYDROMORPHONE HYDROCHLORIDE 1 MG/ML
0.2 INJECTION, SOLUTION INTRAMUSCULAR; INTRAVENOUS; SUBCUTANEOUS EVERY 5 MIN PRN
Status: DISCONTINUED | OUTPATIENT
Start: 2023-09-13 | End: 2023-09-14

## 2023-09-13 RX ORDER — SODIUM CHLORIDE, SODIUM LACTATE, POTASSIUM CHLORIDE, CALCIUM CHLORIDE 600; 310; 30; 20 MG/100ML; MG/100ML; MG/100ML; MG/100ML
INJECTION, SOLUTION INTRAVENOUS CONTINUOUS PRN
Status: DISCONTINUED | OUTPATIENT
Start: 2023-09-13 | End: 2023-09-13

## 2023-09-13 RX ORDER — LIDOCAINE HYDROCHLORIDE AND EPINEPHRINE 10; 10 MG/ML; UG/ML
INJECTION, SOLUTION INFILTRATION; PERINEURAL AS NEEDED
Status: DISCONTINUED | OUTPATIENT
Start: 2023-09-13 | End: 2023-09-13 | Stop reason: HOSPADM

## 2023-09-13 RX ORDER — CLINDAMYCIN PHOSPHATE 900 MG/50ML
INJECTION, SOLUTION INTRAVENOUS AS NEEDED
Status: DISCONTINUED | OUTPATIENT
Start: 2023-09-13 | End: 2023-09-13

## 2023-09-13 RX ORDER — HYDROMORPHONE HYDROCHLORIDE 1 MG/ML
0.4 INJECTION, SOLUTION INTRAMUSCULAR; INTRAVENOUS; SUBCUTANEOUS EVERY 5 MIN PRN
Status: DISCONTINUED | OUTPATIENT
Start: 2023-09-13 | End: 2023-09-14

## 2023-09-13 RX ORDER — MORPHINE SULFATE 10 MG/ML
6 INJECTION, SOLUTION INTRAMUSCULAR; INTRAVENOUS EVERY 10 MIN PRN
Status: DISCONTINUED | OUTPATIENT
Start: 2023-09-13 | End: 2023-09-14

## 2023-09-13 RX ORDER — MORPHINE SULFATE 4 MG/ML
4 INJECTION, SOLUTION INTRAMUSCULAR; INTRAVENOUS EVERY 10 MIN PRN
Status: DISCONTINUED | OUTPATIENT
Start: 2023-09-13 | End: 2023-09-14

## 2023-09-13 RX ORDER — DEXAMETHASONE SODIUM PHOSPHATE 4 MG/ML
VIAL (ML) INJECTION AS NEEDED
Status: DISCONTINUED | OUTPATIENT
Start: 2023-09-13 | End: 2023-09-13 | Stop reason: SURG

## 2023-09-13 RX ORDER — MIDAZOLAM HYDROCHLORIDE 1 MG/ML
INJECTION INTRAMUSCULAR; INTRAVENOUS AS NEEDED
Status: DISCONTINUED | OUTPATIENT
Start: 2023-09-13 | End: 2023-09-13 | Stop reason: SURG

## 2023-09-13 RX ORDER — MORPHINE SULFATE 4 MG/ML
2 INJECTION, SOLUTION INTRAMUSCULAR; INTRAVENOUS EVERY 10 MIN PRN
Status: DISCONTINUED | OUTPATIENT
Start: 2023-09-13 | End: 2023-09-14

## 2023-09-13 RX ORDER — CLINDAMYCIN PHOSPHATE 900 MG/50ML
INJECTION, SOLUTION INTRAVENOUS AS NEEDED
Status: DISCONTINUED | OUTPATIENT
Start: 2023-09-13 | End: 2023-09-13 | Stop reason: SURG

## 2023-09-13 RX ORDER — PROCHLORPERAZINE EDISYLATE 5 MG/ML
5 INJECTION INTRAMUSCULAR; INTRAVENOUS EVERY 8 HOURS PRN
Status: DISCONTINUED | OUTPATIENT
Start: 2023-09-13 | End: 2023-09-14

## 2023-09-13 RX ORDER — NALOXONE HYDROCHLORIDE 0.4 MG/ML
0.08 INJECTION, SOLUTION INTRAMUSCULAR; INTRAVENOUS; SUBCUTANEOUS AS NEEDED
Status: DISCONTINUED | OUTPATIENT
Start: 2023-09-13 | End: 2023-09-14

## 2023-09-13 RX ORDER — HYDROMORPHONE HYDROCHLORIDE 1 MG/ML
0.6 INJECTION, SOLUTION INTRAMUSCULAR; INTRAVENOUS; SUBCUTANEOUS EVERY 5 MIN PRN
Status: DISCONTINUED | OUTPATIENT
Start: 2023-09-13 | End: 2023-09-14

## 2023-09-13 RX ORDER — ONDANSETRON 2 MG/ML
4 INJECTION INTRAMUSCULAR; INTRAVENOUS EVERY 6 HOURS PRN
Status: DISCONTINUED | OUTPATIENT
Start: 2023-09-13 | End: 2023-09-14

## 2023-09-13 RX ORDER — MEPERIDINE HYDROCHLORIDE 25 MG/ML
12.5 INJECTION INTRAMUSCULAR; INTRAVENOUS; SUBCUTANEOUS AS NEEDED
Status: DISCONTINUED | OUTPATIENT
Start: 2023-09-13 | End: 2023-09-14

## 2023-09-13 RX ORDER — ONDANSETRON 4 MG/1
4 TABLET, FILM COATED ORAL ONCE
Status: COMPLETED | OUTPATIENT
Start: 2023-09-13 | End: 2023-09-13

## 2023-09-13 RX ORDER — GLYCOPYRROLATE 0.2 MG/ML
INJECTION, SOLUTION INTRAMUSCULAR; INTRAVENOUS AS NEEDED
Status: DISCONTINUED | OUTPATIENT
Start: 2023-09-13 | End: 2023-09-13 | Stop reason: SURG

## 2023-09-13 RX ORDER — LIDOCAINE HYDROCHLORIDE 10 MG/ML
INJECTION, SOLUTION EPIDURAL; INFILTRATION; INTRACAUDAL; PERINEURAL AS NEEDED
Status: DISCONTINUED | OUTPATIENT
Start: 2023-09-13 | End: 2023-09-13 | Stop reason: SURG

## 2023-09-13 RX ORDER — ONDANSETRON 2 MG/ML
INJECTION INTRAMUSCULAR; INTRAVENOUS AS NEEDED
Status: DISCONTINUED | OUTPATIENT
Start: 2023-09-13 | End: 2023-09-13 | Stop reason: SURG

## 2023-09-13 RX ORDER — LIDOCAINE HYDROCHLORIDE 40 MG/ML
SOLUTION TOPICAL AS NEEDED
Status: DISCONTINUED | OUTPATIENT
Start: 2023-09-13 | End: 2023-09-13 | Stop reason: SURG

## 2023-09-13 RX ORDER — MORPHINE SULFATE 2 MG/ML
2 INJECTION, SOLUTION INTRAMUSCULAR; INTRAVENOUS EVERY 10 MIN PRN
Status: DISCONTINUED | OUTPATIENT
Start: 2023-09-13 | End: 2023-09-14

## 2023-09-13 RX ORDER — NALOXONE HYDROCHLORIDE 0.4 MG/ML
80 INJECTION, SOLUTION INTRAMUSCULAR; INTRAVENOUS; SUBCUTANEOUS AS NEEDED
Status: DISCONTINUED | OUTPATIENT
Start: 2023-09-13 | End: 2023-09-14

## 2023-09-13 RX ORDER — ACETAMINOPHEN 500 MG
1000 TABLET ORAL ONCE
Status: COMPLETED | OUTPATIENT
Start: 2023-09-13 | End: 2023-09-13

## 2023-09-13 RX ADMIN — DEXAMETHASONE SODIUM PHOSPHATE 8 MG: 4 MG/ML VIAL (ML) INJECTION at 17:18:00

## 2023-09-13 RX ADMIN — GLYCOPYRROLATE 0.2 MG: 0.2 INJECTION, SOLUTION INTRAMUSCULAR; INTRAVENOUS at 16:51:00

## 2023-09-13 RX ADMIN — LIDOCAINE HYDROCHLORIDE 50 MG: 10 INJECTION, SOLUTION EPIDURAL; INFILTRATION; INTRACAUDAL; PERINEURAL at 16:51:00

## 2023-09-13 RX ADMIN — SODIUM CHLORIDE, SODIUM LACTATE, POTASSIUM CHLORIDE, CALCIUM CHLORIDE: 600; 310; 30; 20 INJECTION, SOLUTION INTRAVENOUS at 18:02:00

## 2023-09-13 RX ADMIN — MIDAZOLAM HYDROCHLORIDE 2 MG: 1 INJECTION INTRAMUSCULAR; INTRAVENOUS at 16:51:00

## 2023-09-13 RX ADMIN — ONDANSETRON 4 MG: 2 INJECTION INTRAMUSCULAR; INTRAVENOUS at 16:51:00

## 2023-09-13 RX ADMIN — CLINDAMYCIN PHOSPHATE 900 MG: 900 INJECTION, SOLUTION INTRAVENOUS at 16:53:00

## 2023-09-13 RX ADMIN — LIDOCAINE HYDROCHLORIDE 4 ML: 40 SOLUTION TOPICAL at 16:51:00

## 2023-11-15 ENCOUNTER — OFFICE VISIT (OUTPATIENT)
Dept: HEMATOLOGY/ONCOLOGY | Facility: HOSPITAL | Age: 43
End: 2023-11-15
Attending: INTERNAL MEDICINE
Payer: COMMERCIAL

## 2023-11-15 VITALS
SYSTOLIC BLOOD PRESSURE: 110 MMHG | HEART RATE: 86 BPM | TEMPERATURE: 98 F | BODY MASS INDEX: 30 KG/M2 | OXYGEN SATURATION: 98 % | DIASTOLIC BLOOD PRESSURE: 74 MMHG | WEIGHT: 206.5 LBS

## 2023-11-15 DIAGNOSIS — C62.91 SEMINOMA OF RIGHT TESTIS (HCC): ICD-10-CM

## 2023-11-15 DIAGNOSIS — T45.1X5A CHEMOTHERAPY-INDUCED PERIPHERAL NEUROPATHY: ICD-10-CM

## 2023-11-15 DIAGNOSIS — R91.8 PULMONARY NODULES: ICD-10-CM

## 2023-11-15 DIAGNOSIS — G62.0 CHEMOTHERAPY-INDUCED PERIPHERAL NEUROPATHY: ICD-10-CM

## 2023-11-15 DIAGNOSIS — C62.91 CANCER, TESTIS, SEMINOMA, RIGHT (HCC): Primary | ICD-10-CM

## 2023-11-15 LAB
AFP-TM SERPL-MCNC: 1.8 NG/ML (ref ?–8)
ALBUMIN SERPL-MCNC: 4 G/DL (ref 3.4–5)
ALBUMIN/GLOB SERPL: 1.3 {RATIO} (ref 1–2)
ALP LIVER SERPL-CCNC: 52 U/L
ALT SERPL-CCNC: 22 U/L
ANION GAP SERPL CALC-SCNC: 8 MMOL/L (ref 0–18)
AST SERPL-CCNC: 21 U/L (ref 15–37)
BASOPHILS # BLD AUTO: 0.04 X10(3) UL (ref 0–0.2)
BASOPHILS NFR BLD AUTO: 0.4 %
BILIRUB SERPL-MCNC: 0.4 MG/DL (ref 0.1–2)
BUN BLD-MCNC: 19 MG/DL (ref 9–23)
CALCIUM BLD-MCNC: 8.9 MG/DL (ref 8.5–10.1)
CHLORIDE SERPL-SCNC: 105 MMOL/L (ref 98–112)
CO2 SERPL-SCNC: 25 MMOL/L (ref 21–32)
CREAT BLD-MCNC: 1.04 MG/DL
EGFRCR SERPLBLD CKD-EPI 2021: 92 ML/MIN/1.73M2 (ref 60–?)
EOSINOPHIL # BLD AUTO: 0.11 X10(3) UL (ref 0–0.7)
EOSINOPHIL NFR BLD AUTO: 1.2 %
ERYTHROCYTE [DISTWIDTH] IN BLOOD BY AUTOMATED COUNT: 12.9 %
FASTING STATUS PATIENT QL REPORTED: NO
GLOBULIN PLAS-MCNC: 3.2 G/DL (ref 2.8–4.4)
GLUCOSE BLD-MCNC: 91 MG/DL (ref 70–99)
HCT VFR BLD AUTO: 39.7 %
HGB BLD-MCNC: 13.7 G/DL
IMM GRANULOCYTES # BLD AUTO: 0.02 X10(3) UL (ref 0–1)
IMM GRANULOCYTES NFR BLD: 0.2 %
LDH SERPL L TO P-CCNC: 160 U/L
LYMPHOCYTES # BLD AUTO: 1.85 X10(3) UL (ref 1–4)
LYMPHOCYTES NFR BLD AUTO: 20.2 %
MCH RBC QN AUTO: 30 PG (ref 26–34)
MCHC RBC AUTO-ENTMCNC: 34.5 G/DL (ref 31–37)
MCV RBC AUTO: 87.1 FL
MONOCYTES # BLD AUTO: 0.55 X10(3) UL (ref 0.1–1)
MONOCYTES NFR BLD AUTO: 6 %
NEUTROPHILS # BLD AUTO: 6.57 X10 (3) UL (ref 1.5–7.7)
NEUTROPHILS # BLD AUTO: 6.57 X10(3) UL (ref 1.5–7.7)
NEUTROPHILS NFR BLD AUTO: 72 %
OSMOLALITY SERPL CALC.SUM OF ELEC: 288 MOSM/KG (ref 275–295)
PLATELET # BLD AUTO: 307 10(3)UL (ref 150–450)
POTASSIUM SERPL-SCNC: 4.1 MMOL/L (ref 3.5–5.1)
PROT SERPL-MCNC: 7.2 G/DL (ref 6.4–8.2)
RBC # BLD AUTO: 4.56 X10(6)UL
SODIUM SERPL-SCNC: 138 MMOL/L (ref 136–145)
WBC # BLD AUTO: 9.1 X10(3) UL (ref 4–11)

## 2023-11-15 PROCEDURE — 99214 OFFICE O/P EST MOD 30 MIN: CPT | Performed by: INTERNAL MEDICINE

## 2023-11-15 NOTE — PROGRESS NOTES
Outpatient Oncology Care Plan   Problem list:   fatigue   Problems related to:   self care   Interventions:   provided general teaching   Expected outcomes:   understands plan of care   Progress towards outcome: making progress     Education Record   Learner: Patient   Barriers / Limitations: None   Method: Discussion   Outcome: Shows understanding   Comments:  Patient here for follow-up. States neuropathy and tinnitus remain unchanged. Denies any additional updates.

## 2023-11-16 LAB — HCG BETA SUBUNIT TUMOR MARKER QN: <1 MIU/ML

## 2023-12-28 ENCOUNTER — OFFICE VISIT (OUTPATIENT)
Dept: INTERNAL MEDICINE CLINIC | Facility: CLINIC | Age: 43
End: 2023-12-28
Payer: COMMERCIAL

## 2023-12-28 VITALS
BODY MASS INDEX: 30.11 KG/M2 | RESPIRATION RATE: 16 BRPM | TEMPERATURE: 98 F | HEART RATE: 93 BPM | WEIGHT: 201 LBS | SYSTOLIC BLOOD PRESSURE: 120 MMHG | OXYGEN SATURATION: 98 % | HEIGHT: 68.5 IN | DIASTOLIC BLOOD PRESSURE: 74 MMHG

## 2023-12-28 DIAGNOSIS — Z00.00 ANNUAL PHYSICAL EXAM: Primary | ICD-10-CM

## 2023-12-28 DIAGNOSIS — Z00.00 ROUTINE GENERAL MEDICAL EXAMINATION AT A HEALTH CARE FACILITY: ICD-10-CM

## 2023-12-28 DIAGNOSIS — R06.02 SOB (SHORTNESS OF BREATH): ICD-10-CM

## 2023-12-28 PROBLEM — N62 GYNECOMASTIA, MALE: Status: RESOLVED | Noted: 2021-11-15 | Resolved: 2023-12-28

## 2023-12-28 PROCEDURE — 3008F BODY MASS INDEX DOCD: CPT | Performed by: INTERNAL MEDICINE

## 2023-12-28 PROCEDURE — 3074F SYST BP LT 130 MM HG: CPT | Performed by: INTERNAL MEDICINE

## 2023-12-28 PROCEDURE — 3078F DIAST BP <80 MM HG: CPT | Performed by: INTERNAL MEDICINE

## 2023-12-28 PROCEDURE — 99396 PREV VISIT EST AGE 40-64: CPT | Performed by: INTERNAL MEDICINE

## 2023-12-29 ENCOUNTER — LAB ENCOUNTER (OUTPATIENT)
Dept: LAB | Facility: HOSPITAL | Age: 43
End: 2023-12-29
Attending: INTERNAL MEDICINE
Payer: COMMERCIAL

## 2023-12-29 DIAGNOSIS — Z00.00 ROUTINE GENERAL MEDICAL EXAMINATION AT A HEALTH CARE FACILITY: ICD-10-CM

## 2023-12-29 LAB
ALBUMIN SERPL-MCNC: 3.9 G/DL (ref 3.4–5)
ALBUMIN/GLOB SERPL: 1.3 {RATIO} (ref 1–2)
ALP LIVER SERPL-CCNC: 54 U/L
ALT SERPL-CCNC: 22 U/L
ANION GAP SERPL CALC-SCNC: 3 MMOL/L (ref 0–18)
AST SERPL-CCNC: 10 U/L (ref 15–37)
BASOPHILS # BLD AUTO: 0.03 X10(3) UL (ref 0–0.2)
BASOPHILS NFR BLD AUTO: 0.5 %
BILIRUB SERPL-MCNC: 0.3 MG/DL (ref 0.1–2)
BILIRUB UR QL STRIP.AUTO: NEGATIVE
BUN BLD-MCNC: 23 MG/DL (ref 9–23)
CALCIUM BLD-MCNC: 8.6 MG/DL (ref 8.5–10.1)
CHLORIDE SERPL-SCNC: 109 MMOL/L (ref 98–112)
CHOLEST SERPL-MCNC: 170 MG/DL (ref ?–200)
CLARITY UR REFRACT.AUTO: CLEAR
CO2 SERPL-SCNC: 29 MMOL/L (ref 21–32)
COLOR UR AUTO: YELLOW
CREAT BLD-MCNC: 1.25 MG/DL
EGFRCR SERPLBLD CKD-EPI 2021: 74 ML/MIN/1.73M2 (ref 60–?)
EOSINOPHIL # BLD AUTO: 0.15 X10(3) UL (ref 0–0.7)
EOSINOPHIL NFR BLD AUTO: 2.4 %
ERYTHROCYTE [DISTWIDTH] IN BLOOD BY AUTOMATED COUNT: 12.6 %
FASTING PATIENT LIPID ANSWER: YES
FASTING STATUS PATIENT QL REPORTED: YES
GLOBULIN PLAS-MCNC: 2.9 G/DL (ref 2.8–4.4)
GLUCOSE BLD-MCNC: 90 MG/DL (ref 70–99)
GLUCOSE UR STRIP.AUTO-MCNC: NORMAL MG/DL
HCT VFR BLD AUTO: 41.7 %
HDLC SERPL-MCNC: 42 MG/DL (ref 40–59)
HGB BLD-MCNC: 14.2 G/DL
IMM GRANULOCYTES # BLD AUTO: 0.03 X10(3) UL (ref 0–1)
IMM GRANULOCYTES NFR BLD: 0.5 %
KETONES UR STRIP.AUTO-MCNC: NEGATIVE MG/DL
LDLC SERPL CALC-MCNC: 112 MG/DL (ref ?–100)
LEUKOCYTE ESTERASE UR QL STRIP.AUTO: NEGATIVE
LYMPHOCYTES # BLD AUTO: 1.8 X10(3) UL (ref 1–4)
LYMPHOCYTES NFR BLD AUTO: 28.8 %
MCH RBC QN AUTO: 30.2 PG (ref 26–34)
MCHC RBC AUTO-ENTMCNC: 34.1 G/DL (ref 31–37)
MCV RBC AUTO: 88.7 FL
MONOCYTES # BLD AUTO: 0.48 X10(3) UL (ref 0.1–1)
MONOCYTES NFR BLD AUTO: 7.7 %
NEUTROPHILS # BLD AUTO: 3.75 X10 (3) UL (ref 1.5–7.7)
NEUTROPHILS # BLD AUTO: 3.75 X10(3) UL (ref 1.5–7.7)
NEUTROPHILS NFR BLD AUTO: 60.1 %
NITRITE UR QL STRIP.AUTO: NEGATIVE
NONHDLC SERPL-MCNC: 128 MG/DL (ref ?–130)
OSMOLALITY SERPL CALC.SUM OF ELEC: 295 MOSM/KG (ref 275–295)
PH UR STRIP.AUTO: 5.5 [PH] (ref 5–8)
PLATELET # BLD AUTO: 278 10(3)UL (ref 150–450)
POTASSIUM SERPL-SCNC: 4 MMOL/L (ref 3.5–5.1)
PROT SERPL-MCNC: 6.8 G/DL (ref 6.4–8.2)
PROT UR STRIP.AUTO-MCNC: NEGATIVE MG/DL
RBC # BLD AUTO: 4.7 X10(6)UL
RBC UR QL AUTO: NEGATIVE
SODIUM SERPL-SCNC: 141 MMOL/L (ref 136–145)
SP GR UR STRIP.AUTO: >1.03 (ref 1–1.03)
TRIGL SERPL-MCNC: 88 MG/DL (ref 30–149)
TSI SER-ACNC: 1.26 MIU/ML (ref 0.36–3.74)
UROBILINOGEN UR STRIP.AUTO-MCNC: NORMAL MG/DL
VLDLC SERPL CALC-MCNC: 15 MG/DL (ref 0–30)
WBC # BLD AUTO: 6.2 X10(3) UL (ref 4–11)

## 2023-12-29 PROCEDURE — 36415 COLL VENOUS BLD VENIPUNCTURE: CPT

## 2023-12-29 PROCEDURE — 84443 ASSAY THYROID STIM HORMONE: CPT

## 2023-12-29 PROCEDURE — 81003 URINALYSIS AUTO W/O SCOPE: CPT

## 2023-12-29 PROCEDURE — 85025 COMPLETE CBC W/AUTO DIFF WBC: CPT

## 2023-12-29 PROCEDURE — 80053 COMPREHEN METABOLIC PANEL: CPT

## 2023-12-29 PROCEDURE — 80061 LIPID PANEL: CPT

## 2024-01-27 ENCOUNTER — HOSPITAL ENCOUNTER (OUTPATIENT)
Dept: CV DIAGNOSTICS | Facility: HOSPITAL | Age: 44
Discharge: HOME | End: 2024-01-27
Attending: INTERNAL MEDICINE
Payer: COMMERCIAL

## 2024-01-27 DIAGNOSIS — R06.02 SOB (SHORTNESS OF BREATH): ICD-10-CM

## 2024-01-27 PROCEDURE — 93306 TTE W/DOPPLER COMPLETE: CPT | Performed by: INTERNAL MEDICINE

## 2024-05-10 ENCOUNTER — HOSPITAL ENCOUNTER (OUTPATIENT)
Dept: GENERAL RADIOLOGY | Facility: HOSPITAL | Age: 44
Discharge: HOME | End: 2024-05-10
Attending: INTERNAL MEDICINE

## 2024-05-10 DIAGNOSIS — C62.91 CANCER, TESTIS, SEMINOMA, RIGHT (HCC): ICD-10-CM

## 2024-05-10 PROCEDURE — 71046 X-RAY EXAM CHEST 2 VIEWS: CPT | Performed by: INTERNAL MEDICINE

## 2024-05-11 ENCOUNTER — HOSPITAL ENCOUNTER (OUTPATIENT)
Dept: CT IMAGING | Facility: HOSPITAL | Age: 44
Discharge: HOME | End: 2024-05-11
Attending: INTERNAL MEDICINE

## 2024-05-11 DIAGNOSIS — C62.91 CANCER, TESTIS, SEMINOMA, RIGHT (HCC): ICD-10-CM

## 2024-05-11 PROCEDURE — 74176 CT ABD & PELVIS W/O CONTRAST: CPT | Performed by: INTERNAL MEDICINE

## 2024-05-15 ENCOUNTER — OFFICE VISIT (OUTPATIENT)
Dept: HEMATOLOGY/ONCOLOGY | Facility: HOSPITAL | Age: 44
End: 2024-05-15
Attending: INTERNAL MEDICINE

## 2024-05-15 VITALS
BODY MASS INDEX: 30.86 KG/M2 | OXYGEN SATURATION: 99 % | RESPIRATION RATE: 16 BRPM | SYSTOLIC BLOOD PRESSURE: 115 MMHG | TEMPERATURE: 97 F | HEART RATE: 90 BPM | HEIGHT: 68.5 IN | DIASTOLIC BLOOD PRESSURE: 79 MMHG | WEIGHT: 206 LBS

## 2024-05-15 DIAGNOSIS — C62.91 SEMINOMA OF RIGHT TESTIS (HCC): Primary | ICD-10-CM

## 2024-05-15 DIAGNOSIS — G62.0 CHEMOTHERAPY-INDUCED PERIPHERAL NEUROPATHY (HCC): ICD-10-CM

## 2024-05-15 DIAGNOSIS — T45.1X5A CHEMOTHERAPY-INDUCED PERIPHERAL NEUROPATHY (HCC): ICD-10-CM

## 2024-05-15 DIAGNOSIS — C62.91 CANCER, TESTIS, SEMINOMA, RIGHT (HCC): ICD-10-CM

## 2024-05-15 LAB
AFP-TM SERPL-MCNC: 1.7 NG/ML (ref ?–8)
ALBUMIN SERPL-MCNC: 4.2 G/DL (ref 3.4–5)
ALBUMIN/GLOB SERPL: 1.2 {RATIO} (ref 1–2)
ALP LIVER SERPL-CCNC: 55 U/L
ALT SERPL-CCNC: 26 U/L
ANION GAP SERPL CALC-SCNC: 7 MMOL/L (ref 0–18)
AST SERPL-CCNC: 17 U/L (ref 15–37)
BASOPHILS # BLD AUTO: 0.04 X10(3) UL (ref 0–0.2)
BASOPHILS NFR BLD AUTO: 0.5 %
BILIRUB SERPL-MCNC: 0.4 MG/DL (ref 0.1–2)
BUN BLD-MCNC: 20 MG/DL (ref 9–23)
CALCIUM BLD-MCNC: 8.6 MG/DL (ref 8.5–10.1)
CHLORIDE SERPL-SCNC: 104 MMOL/L (ref 98–112)
CO2 SERPL-SCNC: 26 MMOL/L (ref 21–32)
CREAT BLD-MCNC: 1.2 MG/DL
EGFRCR SERPLBLD CKD-EPI 2021: 77 ML/MIN/1.73M2 (ref 60–?)
EOSINOPHIL # BLD AUTO: 0.17 X10(3) UL (ref 0–0.7)
EOSINOPHIL NFR BLD AUTO: 2 %
ERYTHROCYTE [DISTWIDTH] IN BLOOD BY AUTOMATED COUNT: 12.4 %
GLOBULIN PLAS-MCNC: 3.5 G/DL (ref 2.8–4.4)
GLUCOSE BLD-MCNC: 94 MG/DL (ref 70–99)
HCT VFR BLD AUTO: 43.8 %
HGB BLD-MCNC: 14.9 G/DL
IMM GRANULOCYTES # BLD AUTO: 0.02 X10(3) UL (ref 0–1)
IMM GRANULOCYTES NFR BLD: 0.2 %
LDH SERPL L TO P-CCNC: 148 U/L
LYMPHOCYTES # BLD AUTO: 1.79 X10(3) UL (ref 1–4)
LYMPHOCYTES NFR BLD AUTO: 21 %
MCH RBC QN AUTO: 30.2 PG (ref 26–34)
MCHC RBC AUTO-ENTMCNC: 34 G/DL (ref 31–37)
MCV RBC AUTO: 88.7 FL
MONOCYTES # BLD AUTO: 0.6 X10(3) UL (ref 0.1–1)
MONOCYTES NFR BLD AUTO: 7 %
NEUTROPHILS # BLD AUTO: 5.9 X10 (3) UL (ref 1.5–7.7)
NEUTROPHILS # BLD AUTO: 5.9 X10(3) UL (ref 1.5–7.7)
NEUTROPHILS NFR BLD AUTO: 69.3 %
OSMOLALITY SERPL CALC.SUM OF ELEC: 286 MOSM/KG (ref 275–295)
PLATELET # BLD AUTO: 290 10(3)UL (ref 150–450)
POTASSIUM SERPL-SCNC: 3.8 MMOL/L (ref 3.5–5.1)
PROT SERPL-MCNC: 7.7 G/DL (ref 6.4–8.2)
RBC # BLD AUTO: 4.94 X10(6)UL
SODIUM SERPL-SCNC: 137 MMOL/L (ref 136–145)
WBC # BLD AUTO: 8.5 X10(3) UL (ref 4–11)

## 2024-05-15 PROCEDURE — G2211 COMPLEX E/M VISIT ADD ON: HCPCS | Performed by: INTERNAL MEDICINE

## 2024-05-15 PROCEDURE — 99214 OFFICE O/P EST MOD 30 MIN: CPT | Performed by: INTERNAL MEDICINE

## 2024-05-15 NOTE — PROGRESS NOTES
Outpatient Oncology Care Plan  Problem list:  knowledge deficit  Fatigue  neuropathy     Problems related to:    chemotherapy     Interventions:  provided general teaching     Expected outcomes:  understands plan of care     Progress towards outcome:  making progress     Education Record     Learner:  Patient  Barriers / Limitations:  None  Method:  Brief focused  Outcome:  Shows understanding  Comments: 6 month f/up testicular ca. States he is feeling well. Denies any updates to med or hx. Looking to discuss CT.

## 2024-05-15 NOTE — PROGRESS NOTES
Hematology/Oncology Clinic Follow Up Visit    Patient Name: Jesús Glez  Medical Record Number: [id number]   YOB: 1980    PCP: Dr. Olivier Baig  Other providers: Dr. 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  -12/15/20- ultrasound scrotum- The right testicle is abnormal, with heterogeneous echotexture and multiple solid-appearing nodular lesions (2 largest foci measuring 1.9 x 1.5 x 1.5 cm and 1.3 x 1.2 x 1.3 cm).  There is some slight increased vascularity to the right testicle as well compared to the left.  -1/13/21- right radical orchiectomy (Dr. Moreno)- pathology consistent with classic seminoma.  Tumor nodules measure 1.8 and 1.5 cm respectively.  No definite lymphatic/vascular invasion.  Spermatic cord, epididymis, and tunica allodynia/vaginalis are all negative for tumor involvement.  Negative margins. pT1a  -1/29/21- CT c/a/p (no contrast)- Several subcentimeter pulmonary nodules, mostly subpleural and pleural based nodules are identified. Development of retroperitoneal adenopathy (several periaortic nodes, measuring up to 1.1 cm) not seen on prior CT of the abdomen 12/13/2018.  Small 5 mm nodule anterior to the right psoas muscle at the level of the iliac crest.   -2/15/21- cycle 1 BEP (bleomycin 30 units IV D1,8,15, etoposide 100mg/m² IV D1-5, cisplatin 20mg/m² D1-5, Pegfilgrastim D6; q21 days)    -D8 bleomycin c/b hypersensitivity reaction (dyspnea, uvulitis).  Bleomycin omitted on D15 and for future cycles   -3/8/21- cycle 2 EP (dosed same as C1 but omission of bleo)  -3/29/21- cycle 3 EP  -4/19/21- cycle 4 EP  -5/7/21- CT c/a/p- Favorable  changes. Near complete resolution of tiny and small pulmonary nodules.  Retroperitoneal adenopathy has improved- a few scattered tiny lymph nodes are present, none are enlarged by size criteria. Stable mild splenomegaly.  No new sites of metastatic disease.      -7/10/21- CT chest- No evidence of new or progressive disease in the chest.  Stable noncalcified pulmonary nodules in the lungs measuring up to 4 mm. Splenomegaly.  -9/11/21- CT c/a/p- Stable overall.  Few tiny subpleural nodules ranging in size from 2-4 mm are unchanged. No new pulmonary nodule. Residual thymus noted in the anterior superior mediastinum unchanged. Splenomegaly. Normal caliber lymph nodes in the retroperitoneum and pelvis.    -11/13/21- CT chest- No evidence of recurrence.  No lymphadenopathy.  No suspicious pulmonary mass.  There are few faint pleural versus subpleural nodules in the lungs bilaterally which are stable ranging in size from 2-3 mm.  -1/15/22- CT c/a/p- Stable CT with tiny subpleural nodules unchanged from the previous exam.  No new or progressive metastatic disease. Mild splenomegaly is slightly improved. Stable sclerotic osseous foci within the thoracic vertebral bodies.   -3/12/22- CT chest- Stable benign appearance.  No evidence for metastatic disease to the chest.   -5/14/22- CT c/a/p-  No suspicious nodule, mass or consolidation lung parenchyma. No lymphadenopathy.  Residual thymic tissue in the anterior mediastinum is stable.  -8/13/22- CT chest- no evidence of malignancy  -11/19/22- CT c/a/p-  Stable appearance, with no evidence for developing malignant disease.  -2/11/23- CT chest- Stable tiny nodularity.  No evidence of disease recurrence.  -5/13/23- CT c/a/p- stable, no evidence of metastatic disease recurrence  -5/10/24- CXR- normal  -5/11/24- CT a/p- No evidence of metastatic disease in the abdomen and pelvis. Incidental left scrotal varicocele is noted.  Nonspecific inflammatory change around the celiac axis is  noted.     ================================  Interval events: Presents for follow-up.  He is now 3 years out from the end of treatment.    He denies any significant changes.  Neuropathy and tinnitus remains stable. Neuropathy in feet is bothersome when he walks for longer distances but he still does not feel it is bothersome enough to warrant trying any medication for it.    He denies any new aches or pains.  No chest pain, cough, or dyspnea.  No recent fevers or infections.  No bowel or bladder changes.  No bleeding.    Past Medical History:  Past Medical History:    Acute pharyngitis    Allergic rhinitis    Bronchitis    Cancer, testis, seminoma, right (HCC)    Dermatitis    Esophageal reflux    History of blood transfusion    2021    Hx of motion sickness    Personal history of antineoplastic chemotherapy    2021 - testicular cancer    Pneumonia due to organism    Tachycardia    \"Mild tachycardia\" since chemotherapy    Visual impairment    glasses     Past Surgical History:   Procedure Laterality Date    Hernia surgery  07/2019    Orchiectomy   Right 01/13/2021     Home Medications:   WAL-ITIN D 24 HOUR  MG Oral Tablet 24 Hr TAKE ONE TABLET BY MOUTH EVERY DAY. 90 tablet 1    Fluticasone Propionate 50 MCG/ACT Nasal Suspension 2 sprays by Nasal route daily. 3 each 3    hydrOXYzine 10 MG Oral Tab TAKE 1 TABLET BY MOUTH EVERY NIGHT AT BEDTIME 90 tablet 3    EPINEPHrine 0.3 MG/0.3ML Injection Solution Auto-injector USE AS DIRECTED FOR ANAPHYLAXIS, THEN CALL 911. Brand or generic ok. 1 each 3    Calcium Carbonate Antacid 500 MG Oral Chew Tab Chew 1 tablet (500 mg total) by mouth as needed.      ONE-A-DAY MENS OR Take 1 tablet by mouth daily.         Allergies:   Allergies   Allergen Reactions    Bleomycin SHORTNESS OF BREATH and Tightness in Throat    Nuts ANAPHYLAXIS    Other ANAPHYLAXIS     Beans, legumes    Radiology Contrast Iodinated Dyes OTHER (SEE COMMENTS)     Swollen uvula       Psychosocial  History:  Social History     Social History Narrative    Single, lives alone.  Works as an .  No children.  Has a brother who lives in [place].  His parents live in [place].     Social History     Socioeconomic History    Marital status: Single   Tobacco Use    Smoking status: Never    Smokeless tobacco: Never   Vaping Use    Vaping status: Never Used   Substance and Sexual Activity    Alcohol use: Yes     Comment: rarely    Drug use: No   Other Topics Concern    Caffeine Concern Yes     Comment: 3-4 per day    Exercise No   Social History Narrative    Single, lives alone.  Works as an .  No children.  Has a brother who lives in [place].  His parents live in [place].     Family Medical History:  Family History   Problem Relation Age of Onset    Heart Disease Father 61        angioplasty    Cancer Maternal Grandmother 59        colon    Cancer Maternal Grandfather 88        pancreatic     Review of Systems:  A 10-point ROS was done with pertinent positives and negative per the HPI    Vital Signs:  Height: 174 cm (5' 8.5\") (05/15 1536)  Weight: 93.4 kg (206 lb) (05/15 1536)  BSA (Calculated - sq m): 2.08 sq meters (05/15 1536)  Pulse: 90 (05/15 1536)  BP: 115/79 (05/15 1536)  Temp: 97.1 °F (36.2 °C) (05/15 1536)  Do Not Use - Resp Rate: --  SpO2: 99 % (05/15 1536)    Wt Readings from Last 6 Encounters:   05/15/24 93.4 kg (206 lb)   12/28/23 91.2 kg (201 lb)   11/15/23 93.7 kg (206 lb 8 oz)   09/13/23 94.3 kg (208 lb)   09/12/23 94.3 kg (208 lb)   08/11/23 93 kg (205 lb)     ECOG PS: 1    Physical Examination:  General: Patient is alert and oriented, not in acute distress  Psych: Mood and affect are appropriate  Eyes: EOMI  ENT: Oropharynx is clear  CV: regular rhythm, no murmurs, no LE edema.   Respiratory: Lungs clear to auscultation bilaterally  GI/Abd: Soft, non-tender with normoactive bowel sounds, no hepatosplenomegaly.   Neurological: Grossly intact   Lymphatics: No  palpable lymphadenopathy  Skin: No petechiae or rashes  - single testis, no abnormalities noted    Laboratory:  Lab Results   Component Value Date    WBC 8.5 05/15/2024    WBC 6.2 12/29/2023    WBC 9.1 11/15/2023    HGB 14.9 05/15/2024    HGB 14.2 12/29/2023    HGB 13.7 11/15/2023    HCT 43.8 05/15/2024    MCV 88.7 05/15/2024    MCH 30.2 05/15/2024    MCHC 34.0 05/15/2024    RDW 12.4 05/15/2024    .0 05/15/2024    .0 12/29/2023    .0 11/15/2023     Lab Results   Component Value Date    GLU 94 05/15/2024    BUN 20 05/15/2024    BUNCREA 14.7 07/14/2021    CREATSERUM 1.20 05/15/2024    CREATSERUM 1.25 12/29/2023    CREATSERUM 1.04 11/15/2023    ANIONGAP 7 05/15/2024    GFR 83 02/05/2020    GFRNAA 84 05/18/2022    GFRAA 97 05/18/2022    CA 8.6 05/15/2024    OSMOCALC 286 05/15/2024    ALKPHO 55 05/15/2024    AST 17 05/15/2024    ALT 26 05/15/2024    BILT 0.4 05/15/2024    TP 7.7 05/15/2024    ALB 4.2 05/15/2024    GLOBULIN 3.5 05/15/2024     05/15/2024    K 3.8 05/15/2024     05/15/2024    CO2 26.0 05/15/2024     Lab Results   Component Value Date    INR 1.0 05/19/2021     Lab Results   Component Value Date     05/15/2024     11/15/2023     05/17/2023     02/15/2023     11/23/2022     08/17/2022     05/18/2022     03/16/2022     01/17/2022     11/15/2021     09/15/2021     07/14/2021     (H) 05/10/2021     02/04/2021     Lab Results   Component Value Date    AFPTM 1.7 05/15/2024    AFPTM 1.8 11/15/2023    AFPTM 1.7 05/17/2023    AFPTM 1.9 02/15/2023    AFPTM 1.7 11/23/2022    AFPTM 1.7 08/17/2022    AFPTM 1.8 05/18/2022    AFPTM 1.8 03/16/2022    AFPTM 1.9 01/17/2022    AFPTM 1.7 11/15/2021    AFPTM 1.7 09/15/2021    AFPTM 1.9 07/14/2021    AFPTM 3.3 05/10/2021    AFPTM 1.6 02/04/2021    AFPTM 2.8 01/06/2021     Lab Results   Component Value Date    HCGQUANT <1 11/15/2023    HCGQUANT <1  05/17/2023    HCGQUANT <1 02/15/2023    HCGQUANT <1 11/23/2022    HCGQUANT <1 08/17/2022    HCGQUANT <1 05/18/2022    HCGQUANT <1 03/16/2022    HCGQUANT <1 01/17/2022    HCGQUANT <1 11/15/2021    HCGQUANT <1 09/15/2021    HCGQUANT <1 07/14/2021    HCGQUANT <1 05/10/2021    HCGQUANT <1 02/04/2021    HCGQUANT <1 01/06/2021     Imaging:    PFTs 2/13/21:  FVC 4.74L (90% pred)    FEV1 4.24L (102% pred)    FEV1/FVC 81%    DLCO 88% pred     Impression & Plan:     *Seminoma of right testis, stage IIIA   -Enlarged retroperitoneal lymph nodes appear to be involved by malignancy, also has multiple subcentimeter pulmonary nodules likely represent early pulmonary metastases.      -Started BEP on 2/15/2021. C/b CINV with acid reflux and diarrhea.  Day 8 bleomycin complicated by hypersensitivity reaction and therefore switched to EP, completed 4 cycles.  End of treatment imaging on 5/7/21 showed no significant persistent disease.    -No evidence of recurrent disease at this time.  Currently 3 yrs out from treatment- start year 4 surveillance.  -Continue surveillance as below:    Year 4  -clinic visits with tumor markers q6 mths (due 11/2024)                  -CT a/p + CXR annually (due 5/2025)    Year 5 -clinic visits with tumor markers annually   -CXR annually  -We have discussed the importance of lifestyle modifications to minimize cardiovascular risk going forward.   I have encouraged him to exercise regularly    *Subcentimeter pulmonary nodules  -Stable on multiple prior CT scans, transitioned to chest x-ray surveillance per routine seminoma surveillance guidelines as above    *Chemotherapy induced neuropathy and tinnitus  -Due to prior cisplatin exposure.  At this point he is >2 years out from treatment and therefore I advised that his residual neuropathy present is likely permanent.  Neuropathy is not very painful, he has not wanted to try gabapentin.    RTC in 6 months    Abdi Acosta MD  Hematology/Medical  Oncology  [facility]

## 2024-05-16 LAB — HCG BETA SUBUNIT TUMOR MARKER QN: <1 MIU/ML

## 2024-09-10 ENCOUNTER — TELEPHONE (OUTPATIENT)
Dept: HEMATOLOGY/ONCOLOGY | Facility: HOSPITAL | Age: 44
End: 2024-09-10

## 2024-11-13 ENCOUNTER — APPOINTMENT (OUTPATIENT)
Dept: HEMATOLOGY/ONCOLOGY | Facility: HOSPITAL | Age: 44
End: 2024-11-13
Attending: INTERNAL MEDICINE
Payer: COMMERCIAL

## 2024-11-14 ENCOUNTER — TELEPHONE (OUTPATIENT)
Dept: HEMATOLOGY/ONCOLOGY | Facility: HOSPITAL | Age: 44
End: 2024-11-14

## 2024-11-14 ENCOUNTER — OFFICE VISIT (OUTPATIENT)
Dept: HEMATOLOGY/ONCOLOGY | Facility: HOSPITAL | Age: 44
End: 2024-11-14
Attending: INTERNAL MEDICINE
Payer: COMMERCIAL

## 2024-11-14 VITALS
OXYGEN SATURATION: 99 % | RESPIRATION RATE: 20 BRPM | SYSTOLIC BLOOD PRESSURE: 113 MMHG | DIASTOLIC BLOOD PRESSURE: 76 MMHG | BODY MASS INDEX: 31.34 KG/M2 | TEMPERATURE: 98 F | HEART RATE: 88 BPM | WEIGHT: 209.19 LBS | HEIGHT: 68.5 IN

## 2024-11-14 DIAGNOSIS — G62.0 CHEMOTHERAPY-INDUCED PERIPHERAL NEUROPATHY (HCC): ICD-10-CM

## 2024-11-14 DIAGNOSIS — C62.91 CANCER, TESTIS, SEMINOMA, RIGHT (HCC): ICD-10-CM

## 2024-11-14 DIAGNOSIS — C62.91 SEMINOMA OF RIGHT TESTIS (HCC): Primary | ICD-10-CM

## 2024-11-14 DIAGNOSIS — M54.50 CHRONIC MIDLINE LOW BACK PAIN, UNSPECIFIED WHETHER SCIATICA PRESENT: ICD-10-CM

## 2024-11-14 DIAGNOSIS — T45.1X5A CHEMOTHERAPY-INDUCED PERIPHERAL NEUROPATHY (HCC): ICD-10-CM

## 2024-11-14 DIAGNOSIS — G89.29 CHRONIC MIDLINE LOW BACK PAIN, UNSPECIFIED WHETHER SCIATICA PRESENT: ICD-10-CM

## 2024-11-14 LAB
AFP-TM SERPL-MCNC: <2.2 NG/ML
ALBUMIN SERPL-MCNC: 4.6 G/DL (ref 3.2–4.8)
ALBUMIN/GLOB SERPL: 2 {RATIO} (ref 1–2)
ALP LIVER SERPL-CCNC: 49 U/L
ALT SERPL-CCNC: 15 U/L
ANION GAP SERPL CALC-SCNC: 9 MMOL/L (ref 0–18)
AST SERPL-CCNC: 17 U/L (ref ?–34)
BASOPHILS # BLD AUTO: 0.05 X10(3) UL (ref 0–0.2)
BASOPHILS NFR BLD AUTO: 0.7 %
BILIRUB SERPL-MCNC: 0.3 MG/DL (ref 0.3–1.2)
BUN BLD-MCNC: 21 MG/DL (ref 9–23)
CALCIUM BLD-MCNC: 9.2 MG/DL (ref 8.7–10.4)
CHLORIDE SERPL-SCNC: 106 MMOL/L (ref 98–112)
CO2 SERPL-SCNC: 25 MMOL/L (ref 21–32)
CREAT BLD-MCNC: 1.17 MG/DL
EGFRCR SERPLBLD CKD-EPI 2021: 79 ML/MIN/1.73M2 (ref 60–?)
EOSINOPHIL # BLD AUTO: 0.18 X10(3) UL (ref 0–0.7)
EOSINOPHIL NFR BLD AUTO: 2.4 %
ERYTHROCYTE [DISTWIDTH] IN BLOOD BY AUTOMATED COUNT: 12.9 %
GLOBULIN PLAS-MCNC: 2.3 G/DL (ref 2–3.5)
GLUCOSE BLD-MCNC: 97 MG/DL (ref 70–99)
HCT VFR BLD AUTO: 41.8 %
HGB BLD-MCNC: 14.2 G/DL
IMM GRANULOCYTES # BLD AUTO: 0.01 X10(3) UL (ref 0–1)
IMM GRANULOCYTES NFR BLD: 0.1 %
LDH SERPL L TO P-CCNC: 151 U/L
LYMPHOCYTES # BLD AUTO: 1.87 X10(3) UL (ref 1–4)
LYMPHOCYTES NFR BLD AUTO: 25 %
MCH RBC QN AUTO: 30.3 PG (ref 26–34)
MCHC RBC AUTO-ENTMCNC: 34 G/DL (ref 31–37)
MCV RBC AUTO: 89.3 FL
MONOCYTES # BLD AUTO: 0.64 X10(3) UL (ref 0.1–1)
MONOCYTES NFR BLD AUTO: 8.5 %
NEUTROPHILS # BLD AUTO: 4.74 X10 (3) UL (ref 1.5–7.7)
NEUTROPHILS # BLD AUTO: 4.74 X10(3) UL (ref 1.5–7.7)
NEUTROPHILS NFR BLD AUTO: 63.3 %
OSMOLALITY SERPL CALC.SUM OF ELEC: 293 MOSM/KG (ref 275–295)
PLATELET # BLD AUTO: 274 10(3)UL (ref 150–450)
POTASSIUM SERPL-SCNC: 4.1 MMOL/L (ref 3.5–5.1)
PROT SERPL-MCNC: 6.9 G/DL (ref 5.7–8.2)
RBC # BLD AUTO: 4.68 X10(6)UL
SODIUM SERPL-SCNC: 140 MMOL/L (ref 136–145)
WBC # BLD AUTO: 7.5 X10(3) UL (ref 4–11)

## 2024-11-14 PROCEDURE — 99215 OFFICE O/P EST HI 40 MIN: CPT | Performed by: INTERNAL MEDICINE

## 2024-11-14 PROCEDURE — G2211 COMPLEX E/M VISIT ADD ON: HCPCS | Performed by: INTERNAL MEDICINE

## 2024-11-14 NOTE — TELEPHONE ENCOUNTER
Spoke with pt about MD recommendations. He is ok waiting with current apts. Will call back if he changes his mind.

## 2024-11-14 NOTE — PROGRESS NOTES
Hematology/Oncology Clinic Follow Up Visit    Patient Name: Jesús Glez  Medical Record Number: [id number]   YOB: 1980    PCP: Dr. Olivier Baig  Other providers: Dr. 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  -12/15/20- ultrasound scrotum- The right testicle is abnormal, with heterogeneous echotexture and multiple solid-appearing nodular lesions (2 largest foci measuring 1.9 x 1.5 x 1.5 cm and 1.3 x 1.2 x 1.3 cm).  There is some slight increased vascularity to the right testicle as well compared to the left.  -1/13/21- right radical orchiectomy (Dr. Moreno)- pathology consistent with classic seminoma.  Tumor nodules measure 1.8 and 1.5 cm respectively.  No definite lymphatic/vascular invasion.  Spermatic cord, epididymis, and tunica allodynia/vaginalis are all negative for tumor involvement.  Negative margins. pT1a  -1/29/21- CT c/a/p (no contrast)- Several subcentimeter pulmonary nodules, mostly subpleural and pleural based nodules are identified. Development of retroperitoneal adenopathy (several periaortic nodes, measuring up to 1.1 cm) not seen on prior CT of the abdomen 12/13/2018.  Small 5 mm nodule anterior to the right psoas muscle at the level of the iliac crest.   -2/15/21- cycle 1 BEP (bleomycin 30 units IV D1,8,15, etoposide 100mg/m² IV D1-5, cisplatin 20mg/m² D1-5, Pegfilgrastim D6; q21 days)    -D8 bleomycin c/b hypersensitivity reaction (dyspnea, uvulitis).  Bleomycin omitted on D15 and for future cycles   -3/8/21- cycle 2 EP (dosed same as C1 but omission of bleo)  -3/29/21- cycle 3 EP  -4/19/21- cycle 4 EP  -5/7/21- CT c/a/p- Favorable  changes. Near complete resolution of tiny and small pulmonary nodules.  Retroperitoneal adenopathy has improved- a few scattered tiny lymph nodes are present, none are enlarged by size criteria. Stable mild splenomegaly.  No new sites of metastatic disease.      -7/10/21- CT chest- No evidence of new or progressive disease in the chest.  Stable noncalcified pulmonary nodules in the lungs measuring up to 4 mm. Splenomegaly.  -9/11/21- CT c/a/p- Stable overall.  Few tiny subpleural nodules ranging in size from 2-4 mm are unchanged. No new pulmonary nodule. Residual thymus noted in the anterior superior mediastinum unchanged. Splenomegaly. Normal caliber lymph nodes in the retroperitoneum and pelvis.    -11/13/21- CT chest- No evidence of recurrence.  No lymphadenopathy.  No suspicious pulmonary mass.  There are few faint pleural versus subpleural nodules in the lungs bilaterally which are stable ranging in size from 2-3 mm.  -1/15/22- CT c/a/p- Stable CT with tiny subpleural nodules unchanged from the previous exam.  No new or progressive metastatic disease. Mild splenomegaly is slightly improved. Stable sclerotic osseous foci within the thoracic vertebral bodies.   -3/12/22- CT chest- Stable benign appearance.  No evidence for metastatic disease to the chest.   -5/14/22- CT c/a/p-  No suspicious nodule, mass or consolidation lung parenchyma. No lymphadenopathy.  Residual thymic tissue in the anterior mediastinum is stable.  -8/13/22- CT chest- no evidence of malignancy  -11/19/22- CT c/a/p-  Stable appearance, with no evidence for developing malignant disease.  -2/11/23- CT chest- Stable tiny nodularity.  No evidence of disease recurrence.  -5/13/23- CT c/a/p- stable, no evidence of metastatic disease recurrence  -5/10/24- CXR- normal  -5/11/24- CT a/p- No evidence of metastatic disease in the abdomen and pelvis. Incidental left scrotal varicocele is noted.  Nonspecific inflammatory change around the celiac axis is  noted.     ================================  Interval events: Presents for follow-up.  He is now 3.5 years out from the end of treatment.    He reports having some lower midline back pain since September.  Pain occurs mostly when he stands up.  Infrequently has radiation of pain down his legs.  He has not had any neurologic changes or weakness.  Does not seem to be improving overall.  When he takes ibuprofen it does help but he has not been taking this regularly.  He denies any bowel or bladder changes.    He otherwise denies any new changes.      Baseline peripheral neuropathy and tinnitus remains stable. Neuropathy in feet is bothersome when he walks for longer distances but he has not felt it is bothersome enough to warrant trying any medication for it.    No chest pain, cough, or dyspnea.  No recent fevers or infections.  No bowel or bladder changes.  No bleeding.    Past Medical History:  Past Medical History:    Acute pharyngitis    Allergic rhinitis    Bronchitis    Cancer, testis, seminoma, right (HCC)    Dermatitis    Esophageal reflux    History of blood transfusion    2021    Hx of motion sickness    Personal history of antineoplastic chemotherapy    2021 - testicular cancer    Pneumonia due to organism    Tachycardia    \"Mild tachycardia\" since chemotherapy    Visual impairment    glasses     Past Surgical History:   Procedure Laterality Date    Hernia surgery  07/2019    Orchiectomy   Right 01/13/2021     Home Medications:   WAL-ITIN D 24 HOUR  MG Oral Tablet 24 Hr TAKE ONE TABLET BY MOUTH EVERY DAY. 90 tablet 1    Fluticasone Propionate 50 MCG/ACT Nasal Suspension 2 sprays by Nasal route daily. 3 each 3    hydrOXYzine 10 MG Oral Tab TAKE 1 TABLET BY MOUTH EVERY NIGHT AT BEDTIME 90 tablet 3    EPINEPHrine 0.3 MG/0.3ML Injection Solution Auto-injector USE AS DIRECTED FOR ANAPHYLAXIS, THEN CALL 911. Brand or generic ok. 1 each 3    Calcium Carbonate Antacid 500 MG Oral Chew Tab Chew 1 tablet (500 mg  total) by mouth as needed.      ONE-A-DAY MENS OR Take 1 tablet by mouth daily.         Allergies:   Allergies   Allergen Reactions    Bleomycin SHORTNESS OF BREATH and Tightness in Throat    Nuts ANAPHYLAXIS    Other ANAPHYLAXIS     Beans, legumes    Radiology Contrast Iodinated Dyes OTHER (SEE COMMENTS)     Swollen uvula       Psychosocial History:  Social History     Social History Narrative    Single, lives alone.  Works as an .  No children.  Has a brother who lives in [place].  His parents live in [place].     Social History     Socioeconomic History    Marital status: Single   Tobacco Use    Smoking status: Never    Smokeless tobacco: Never   Vaping Use    Vaping status: Never Used   Substance and Sexual Activity    Alcohol use: Yes     Comment: rarely    Drug use: No   Other Topics Concern    Caffeine Concern Yes     Comment: 3-4 per day    Exercise No   Social History Narrative    Single, lives alone.  Works as an .  No children.  Has a brother who lives in [place].  His parents live in [place].     Family Medical History:  Family History   Problem Relation Age of Onset    Heart Disease Father 61        angioplasty    Cancer Maternal Grandmother 59        colon    Cancer Maternal Grandfather 88        pancreatic     Review of Systems:  A 10-point ROS was done with pertinent positives and negative per the HPI    Vital Signs:  Height: 174 cm (5' 8.5\") (11/14 0924)  Weight: 94.9 kg (209 lb 3.2 oz) (11/14 0924)  BSA (Calculated - sq m): 2.09 sq meters (11/14 0924)  Pulse: 88 (11/14 0924)  BP: 113/76 (11/14 0924)  Temp: 98 °F (36.7 °C) (11/14 0924)  Do Not Use - Resp Rate: --  SpO2: 99 % (11/14 0924)    Wt Readings from Last 6 Encounters:   11/14/24 94.9 kg (209 lb 3.2 oz)   05/15/24 93.4 kg (206 lb)   12/28/23 91.2 kg (201 lb)   11/15/23 93.7 kg (206 lb 8 oz)   09/13/23 94.3 kg (208 lb)   09/12/23 94.3 kg (208 lb)     ECOG PS: 1    Physical Examination:  General:  Patient is alert and oriented, not in acute distress  Psych: Mood and affect are appropriate  Eyes: EOMI  ENT: Oropharynx is clear  CV: regular rhythm, no murmurs, no LE edema.   Respiratory: Lungs clear to auscultation bilaterally  GI/Abd: Soft, non-tender with normoactive bowel sounds, no hepatosplenomegaly.   Neurological: Grossly intact   Lymphatics: No palpable lymphadenopathy  Skin: No petechiae or rashes  - single testis, no abnormalities noted    Laboratory:  Lab Results   Component Value Date    WBC 7.5 11/14/2024    WBC 8.5 05/15/2024    WBC 6.2 12/29/2023    HGB 14.2 11/14/2024    HGB 14.9 05/15/2024    HGB 14.2 12/29/2023    HCT 41.8 11/14/2024    MCV 89.3 11/14/2024    MCH 30.3 11/14/2024    MCHC 34.0 11/14/2024    RDW 12.9 11/14/2024    .0 11/14/2024    .0 05/15/2024    .0 12/29/2023     Lab Results   Component Value Date    GLU 97 11/14/2024    BUN 21 11/14/2024    BUNCREA 14.7 07/14/2021    CREATSERUM 1.17 11/14/2024    CREATSERUM 1.20 05/15/2024    CREATSERUM 1.25 12/29/2023    ANIONGAP 9 11/14/2024    GFR 83 02/05/2020    GFRNAA 84 05/18/2022    GFRAA 97 05/18/2022    CA 9.2 11/14/2024    OSMOCALC 293 11/14/2024    ALKPHO 49 11/14/2024    AST 17 11/14/2024    ALT 15 11/14/2024    BILT 0.3 11/14/2024    TP 6.9 11/14/2024    ALB 4.6 11/14/2024    GLOBULIN 2.3 11/14/2024     11/14/2024    K 4.1 11/14/2024     11/14/2024    CO2 25.0 11/14/2024     Lab Results   Component Value Date    INR 1.0 05/19/2021     Lab Results   Component Value Date     11/14/2024     05/15/2024     11/15/2023     05/17/2023     02/15/2023     11/23/2022     08/17/2022     05/18/2022     03/16/2022     01/17/2022     11/15/2021     09/15/2021     07/14/2021     (H) 05/10/2021     02/04/2021     Lab Results   Component Value Date    AFPTM <2.2 11/14/2024    AFPTM 1.7 05/15/2024    AFPTM 1.8  11/15/2023    AFPTM 1.7 05/17/2023    AFPTM 1.9 02/15/2023    AFPTM 1.7 11/23/2022    AFPTM 1.7 08/17/2022    AFPTM 1.8 05/18/2022    AFPTM 1.8 03/16/2022    AFPTM 1.9 01/17/2022    AFPTM 1.7 11/15/2021    AFPTM 1.7 09/15/2021    AFPTM 1.9 07/14/2021    AFPTM 3.3 05/10/2021    AFPTM 1.6 02/04/2021    AFPTM 2.8 01/06/2021     Lab Results   Component Value Date    HCGQUANT <1 11/14/2024    HCGQUANT <1 05/15/2024    HCGQUANT <1 11/15/2023    HCGQUANT <1 05/17/2023    HCGQUANT <1 02/15/2023    HCGQUANT <1 11/23/2022    HCGQUANT <1 08/17/2022    HCGQUANT <1 05/18/2022    HCGQUANT <1 03/16/2022    HCGQUANT <1 01/17/2022    HCGQUANT <1 11/15/2021    HCGQUANT <1 09/15/2021    HCGQUANT <1 07/14/2021    HCGQUANT <1 05/10/2021    HCGQUANT <1 02/04/2021    HCGQUANT <1 01/06/2021     Imaging:    PFTs 2/13/21:  FVC 4.74L (90% pred)    FEV1 4.24L (102% pred)    FEV1/FVC 81%    DLCO 88% pred     Impression & Plan:     *Seminoma of right testis, stage IIIA   -Enlarged retroperitoneal lymph nodes appear to be involved by malignancy, also has multiple subcentimeter pulmonary nodules likely represent early pulmonary metastases.      -Started BEP on 2/15/2021. C/b CINV with acid reflux and diarrhea.  Day 8 bleomycin complicated by hypersensitivity reaction and therefore switched to EP, completed 4 cycles.  End of treatment imaging on 5/7/21 showed no significant persistent disease.    -No evidence of recurrent disease at this time.  Currently 3.5 yrs out from treatment-   -Continue surveillance as below:    Year 4  -clinic visits with tumor markers q6 mths (due 5/2025)                  -CT a/p + CXR annually (due 5/2025)-order placed    Year 5 -clinic visits with tumor markers annually   -CXR annually  -We have discussed the importance of lifestyle modifications to minimize cardiovascular risk going forward.   I have encouraged him to exercise regularly    *Subcentimeter pulmonary nodules  -Stable on multiple prior CT scans,  transitioned to chest x-ray surveillance per routine seminoma surveillance guidelines as above    *Chemotherapy induced neuropathy and tinnitus  -Due to prior cisplatin exposure.  At this point he is >2 years out from treatment and therefore I advised that his residual neuropathy present is likely permanent.  Neuropathy is not very painful, he has not wanted to try gabapentin.    *Midline lower back pain  -Has been present for a couple months without significant improvement.  Suspect musculoskeletal origin.  PT referral placed.  If not improving following physical therapy, I advised him to notify me, in which case I would perform his CT a/p sooner than 5/2025 as planned.    RTC in 6 months    Abdi Acosta MD  Hematology/Medical Oncology  [facility]     ongoing continuity of complex care related to malignancy.

## 2024-11-14 NOTE — TELEPHONE ENCOUNTER
Patient is calling to inform Dr. Acosta that he has scheduled the P.T. will start on 12/9/24 thru 12/23/24 and he wanted to see if this ok with Dr. Acosta. Do you want him to take the scan. Please advise the patient.

## 2024-11-14 NOTE — PROGRESS NOTES
Outpatient Oncology Care Plan  Problem list:  knowledge deficit  Fatigue  neuropathy     Problems related to:    chemotherapy     Interventions:  provided general teaching     Expected outcomes:  understands plan of care     Progress towards outcome:  making progress     Education Record     Learner:  Patient  Barriers / Limitations:  None  Method:  Brief focused  Outcome:  Shows understanding  Comments: 6 month f/up testicular ca. States he is feeling well. Denies any updates to med or hx. States since September has been having back pain. Has been getting worse. Advil was helpful with pain when he took it. Pt given order for CT scan along with oral contrast and instructions.

## 2024-11-15 PROBLEM — G89.29 CHRONIC MIDLINE LOW BACK PAIN: Status: ACTIVE | Noted: 2024-11-15

## 2024-11-15 PROBLEM — M54.50 CHRONIC MIDLINE LOW BACK PAIN: Status: ACTIVE | Noted: 2024-11-15

## 2024-11-15 LAB — HCG BETA SUBUNIT TUMOR MARKER QN: <1 MIU/ML

## 2024-11-25 ENCOUNTER — TELEMEDICINE (OUTPATIENT)
Dept: INTERNAL MEDICINE CLINIC | Facility: CLINIC | Age: 44
End: 2024-11-25
Payer: COMMERCIAL

## 2024-11-25 ENCOUNTER — HOSPITAL ENCOUNTER (OUTPATIENT)
Dept: GENERAL RADIOLOGY | Facility: HOSPITAL | Age: 44
Discharge: HOME | End: 2024-11-25
Payer: COMMERCIAL

## 2024-11-25 DIAGNOSIS — M54.50 CHRONIC MIDLINE LOW BACK PAIN WITHOUT SCIATICA: Primary | ICD-10-CM

## 2024-11-25 DIAGNOSIS — M54.50 CHRONIC MIDLINE LOW BACK PAIN WITHOUT SCIATICA: ICD-10-CM

## 2024-11-25 DIAGNOSIS — G89.29 CHRONIC MIDLINE LOW BACK PAIN WITHOUT SCIATICA: ICD-10-CM

## 2024-11-25 DIAGNOSIS — G89.29 CHRONIC MIDLINE LOW BACK PAIN WITHOUT SCIATICA: Primary | ICD-10-CM

## 2024-11-25 PROCEDURE — 72110 X-RAY EXAM L-2 SPINE 4/>VWS: CPT

## 2024-11-25 RX ORDER — NAPROXEN 500 MG/1
500 TABLET ORAL 2 TIMES DAILY WITH MEALS
Qty: 28 TABLET | Refills: 0 | Status: SHIPPED | OUTPATIENT
Start: 2024-11-25 | End: 2024-12-09

## 2024-11-25 NOTE — PROGRESS NOTES
Jesús Glez is a 44 year old male.  HPI:   This visit is conducted using Telemedicine with live, interactive video and audio.     Patient consents to video visit today to discuss chronic center low back pain x 3 months. Does not radiate. Pain increases with standing, decreases when bending. The pain is achy, sharp when stands up straight. The pain is dull with laying flat. Pt states he has chronic neuropathy in his LE due to chemo, the neuropathy has not increased. Advil helps with pain.   Denies trauma to the area. Denies weakness in LE, loss of bowel/bladder function.  Has been given PT referral by his oncologist but not able to start PT due to availability for few weeks.   Current Outpatient Medications   Medication Sig Dispense Refill    naproxen 500 MG Oral Tab Take 1 tablet (500 mg total) by mouth 2 (two) times daily with meals for 14 days. 28 tablet 0    WAL-ITIN D 24 HOUR  MG Oral Tablet 24 Hr TAKE ONE TABLET BY MOUTH EVERY DAY. 90 tablet 1    Fluticasone Propionate 50 MCG/ACT Nasal Suspension 2 sprays by Nasal route daily. 3 each 3    hydrOXYzine 10 MG Oral Tab TAKE 1 TABLET BY MOUTH EVERY NIGHT AT BEDTIME 90 tablet 3    EPINEPHrine 0.3 MG/0.3ML Injection Solution Auto-injector USE AS DIRECTED FOR ANAPHYLAXIS, THEN CALL 911. Brand or generic ok. 1 each 3    Calcium Carbonate Antacid 500 MG Oral Chew Tab Chew 1 tablet (500 mg total) by mouth as needed.      ONE-A-DAY MENS OR Take 1 tablet by mouth daily.          Past Medical History:    Acute pharyngitis    Allergic rhinitis    Bronchitis    Cancer, testis, seminoma, right (HCC)    Dermatitis    Esophageal reflux    History of blood transfusion    2021    Hx of motion sickness    Personal history of antineoplastic chemotherapy    2021 - testicular cancer    Pneumonia due to organism    Tachycardia    \"Mild tachycardia\" since chemotherapy    Visual impairment    glasses      Social History:  Social History     Socioeconomic History     Marital status: Single   Tobacco Use    Smoking status: Never    Smokeless tobacco: Never   Vaping Use    Vaping status: Never Used   Substance and Sexual Activity    Alcohol use: Yes     Comment: rarely    Drug use: No   Other Topics Concern    Caffeine Concern Yes     Comment: 3-4 per day    Exercise No   Social History Narrative    Single, lives alone.  Works as an .  No children.  Has a brother who lives in [place].  His parents live in [place].        REVIEW OF SYSTEMS:   GENERAL HEALTH: feels well otherwise. Denies fever, chills, unintentional weight change  SKIN: denies any unusual skin lesions or rashes  RESPIRATORY: denies hemoptysis, shortness of breath with exertion, wheezing or cough  MUSC: low back pain  CARDIOVASCULAR: denies chest pain or palpitations, denies leg swelling  GI: denies abdominal pain and denies heartburn. Denies nausea, vomiting, diarrhea, constipation  NEURO: denies headaches, dizziness, weakness, syncope    EXAM:   No vitals for video visit  Physical Exam  - well appearing via video visit  - no visible rash or diaphoresis  - no respiratory distress or cough observed  - able to speak in full sentences  - alert and oriented        ASSESSMENT AND PLAN:     Encounter Diagnosis   Name Primary?    Chronic midline low back pain without sciatica Yes   Think the pain could be due to lumbar stenosis?   -Naproxen BID x 14 days   -check lumbar xray   -referral to spine navigator ordered   -start PT as scheduled  Requested Prescriptions     Signed Prescriptions Disp Refills    naproxen 500 MG Oral Tab 28 tablet 0     Sig: Take 1 tablet (500 mg total) by mouth 2 (two) times daily with meals for 14 days.         The patient indicates understanding of these issues and agrees to the plan.  Jesús Glez understands phone evaluation is not a substitute for face-to-face examination or emergency care. Patient advised to go to ER or call 911 for worsening symptoms or acute  distress.   Follow up if symptoms persist or worsen.

## 2024-11-26 ENCOUNTER — OFFICE VISIT (OUTPATIENT)
Dept: PHYSICAL THERAPY | Facility: HOSPITAL | Age: 44
End: 2024-11-26
Attending: INTERNAL MEDICINE
Payer: COMMERCIAL

## 2024-11-26 ENCOUNTER — TELEPHONE (OUTPATIENT)
Dept: PHYSICAL THERAPY | Facility: HOSPITAL | Age: 44
End: 2024-11-26

## 2024-11-26 DIAGNOSIS — G89.29 CHRONIC MIDLINE LOW BACK PAIN, UNSPECIFIED WHETHER SCIATICA PRESENT: Primary | ICD-10-CM

## 2024-11-26 DIAGNOSIS — M54.50 CHRONIC MIDLINE LOW BACK PAIN, UNSPECIFIED WHETHER SCIATICA PRESENT: Primary | ICD-10-CM

## 2024-11-26 PROCEDURE — 97110 THERAPEUTIC EXERCISES: CPT

## 2024-11-26 PROCEDURE — 97162 PT EVAL MOD COMPLEX 30 MIN: CPT

## 2024-11-26 NOTE — PROGRESS NOTES
SPINE EVALUATION:     Diagnosis:   Lower back pain      Referring Provider: Abdi Acosta  Date of Evaluation:    11/26/2024    Precautions:  None Next MD visit:   none scheduled  Date of Surgery: n/a     PATIENT SUMMARY   Jesús Glez is a 44 year old male who presents to therapy today with complaints of lower back pain. This pain started mid September. Reports not BRITTNEE. Feels this pain when he is standing up straight, thus extending his back- when he is sitting or bending forward he does not have pain. Got x-rays which were normal. Describes pain as a sharp pain which does not radiate much. If he is standing for a while, the pain will persist even after he sits down. Pain is centralized to middle of lower spine, does not travel down LEs. Has neuropathy in LEs from chemo, but no sciatic related N/T. 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  Pt describes pain level current 1/10, at best 0/10, at worst 7/10.   Current functional limitations include standing up long periods of time, pain when going from sitting to standing.     Jesús describes prior level of function pain for several months. Pt goals include reduced pain.  Past medical history was reviewed with Jesús. Significant findings include   Past Medical History:    Acute pharyngitis    Allergic rhinitis    Bronchitis    Cancer, testis, seminoma, right (HCC)    Dermatitis    Esophageal reflux    History of blood transfusion    2021    Hx of motion sickness    Personal history of antineoplastic chemotherapy    2021 - testicular cancer    Pneumonia due to organism    Tachycardia    \"Mild tachycardia\" since chemotherapy    Visual impairment    glasses       Pt denies diplopia, dysarthria, dysphasia, dizziness, drop attacks, bowel/bladder changes, saddle aesthesis.    ASSESSMENT  Jesús presents to physical therapy evaluation with primary c/o lower back pain. The results of the objective tests and measures show decreased lumbar ROM.  Functional  deficits include but are not limited to pain w/ standing, going from sitting to standing.  Signs and symptoms are consistent with diagnosis of lumbar stenosis. Pt and PT discussed evaluation findings, pathology, POC and HEP.  Pt voiced understanding and performs HEP correctly without reported pain. Skilled Physical Therapy is medically necessary to address the above impairments and reach functional goals.     OBJECTIVE:   Observation/Posture: forward flexed posture  Neuro Screen: decreased LE reflexes.     Lumbar AROM: (* denotes performed with pain)  Flexion: 100%  Extension: 10%*  Sidebending: R 100%; L 100%  Rotation: R 100%; L 100%    Accessory motion: mild hypomobility in lumbar spine.   Palpation: L5 TTP during PA mobs    Strength: (* denotes performed with pain)  LE   Hip flexion (L2): R 4+/5; L 4+/5  Hip abduction: R 4+/5; L 4+/5   Hip ER: R 4+/5; L 4+/5  Knee Flexion: R 4/5; L 4/5   Knee extension (L3): R 4+/5; L 4+/5   DF (L4): R 4/5; L 4/5       Gait: pt ambulates on level ground with stooped posture/forward lean.      Today’s Treatment and Response:   Pt education was provided on exam findings, treatment diagnosis, treatment plan, expectations, and prognosis. Pt was also provided recommendations for possible soreness after evaluation, ceasing HEP should it increase symptoms.     Patient was instructed in and issued a HEP for:   Access Code: [email or web address]  URL: [email or web address]  Date: 11/26/2024  Prepared by: Phyllis Isaacs    Exercises  - Supine Dead Bug with Leg Extension  - 1 x daily - 7 x weekly - 2 sets - 10 reps  - Small Range Straight Leg Raise  - 1 x daily - 7 x weekly - 2 sets - 10 reps  - Supine March with Resistance Band  - 1 x daily - 7 x weekly - 2 sets - 10 reps    Charges: PT Eval Moderate Complexity, x1 ther ex (10 min)      Total Timed Treatment: 10 min     Total Treatment Time: 30 min     Based on clinical rationale and outcome measures, this evaluation involved Moderate  Complexity decision making due to 1-2 personal factors/comorbidities, 3 body structures involved/activity limitations, and evolving symptoms including changing pain levels.  PLAN OF CARE:    Goals: (to be met in 10 visits)   -Within 5 visits, pt will be consistent and independent w/ HEP, thus facilitating recovery.   -Within 5 visits, pt will report no greater than 3/10 on a daily basis.   -Within 10 visits, pt will demonstrate proper core bracing, allowing for improved stability around spine.   -Within 10 visits, pt will demonstrate pain- free lumbar ROM, allowing hip to go from sitting to standing w/o c/o pain.     Frequency / Duration: Patient will be seen for 1-2 x/week or a total of 10 visits over a 90 day period. Treatment will include: Gait training, Manual Therapy, Mechanical Traction, Neuromuscular Re-education, Self-Care Home Management, Therapeutic Activities, Therapeutic Exercise, and Home Exercise Program instruction    Education or treatment limitation: None  Rehab Potential:good    Oswestry Disability Index Score  Score: (Patient-Rptd) 8 % (11/25/2024  8:14 PM)      Patient/Family/Caregiver was advised of these findings, precautions, and treatment options and has agreed to actively participate in planning and for this course of care.    Thank you for your referral. Please co-sign or sign and return this letter via fax as soon as possible to [phone]. If you have any questions, please contact me at Dept: [phone]    Sincerely,  Electronically signed by therapist: Phyllis Isaacs, PT    Physician's certification required: Yes  I certify the need for these services furnished under this plan of treatment and while under my care.    X___________________________________________________ Date____________________    Certification From: 11/26/2024  To:2/24/2025

## 2024-11-27 ENCOUNTER — SPINE CENTER NAVIGATION (OUTPATIENT)
Age: 44
End: 2024-11-27

## 2024-11-27 NOTE — PROGRESS NOTES
Spine Center Referral Navigation Encounter Note    Referred by: Ben Conrad    Imaging: Xray  If imaging done at an external facility, instructed patient to bring disc of MRI to appointment.     Previously Seen Spine Care Providers: None    Referred to: Enoc Gomez PA-C in Pain Management     Information below is patient reported.     [facility]    [facility] WILL PT USE CVG?   Will the patient use insurance for this visit?      Yes    [facility] AMB [facility]INE [facility] START   Are you currently experiencing any of the following symptoms?      No    Is your condition due to an injury that occurred at work or is your care for this condition being coordinated by Worker's Compensation?      No    Are you looking for a second surgical opinion?      No    Have you had surgery on your spine (neck or back) in the last 12 months?      No    In the last several weeks, have you experienced weakness or balance issues that have caused falls or difficulty lifting your legs or feet (lower body) and/or weakness that has caused you to drop items or caused changes in handwriting (upper body)?      No    In the last 3 months, have you had spine injections AND physical therapy for your back or neck that did not improve your symptoms?      No    : Patient needs to be seen by non-surgical team. Does patient require a new visit or established visit?      New patient visit    Are you closer to [place] or [facility]?      [facility]

## 2024-12-03 ENCOUNTER — OFFICE VISIT (OUTPATIENT)
Dept: PAIN CLINIC | Facility: CLINIC | Age: 44
End: 2024-12-03
Payer: COMMERCIAL

## 2024-12-03 VITALS — OXYGEN SATURATION: 99 % | SYSTOLIC BLOOD PRESSURE: 116 MMHG | HEART RATE: 108 BPM | DIASTOLIC BLOOD PRESSURE: 70 MMHG

## 2024-12-03 DIAGNOSIS — G89.29 CHRONIC MIDLINE LOW BACK PAIN WITHOUT SCIATICA: ICD-10-CM

## 2024-12-03 DIAGNOSIS — M54.50 CHRONIC MIDLINE LOW BACK PAIN WITHOUT SCIATICA: ICD-10-CM

## 2024-12-03 DIAGNOSIS — C62.91 SEMINOMA OF RIGHT TESTIS (HCC): Primary | ICD-10-CM

## 2024-12-03 PROCEDURE — 3078F DIAST BP <80 MM HG: CPT | Performed by: PHYSICIAN ASSISTANT

## 2024-12-03 PROCEDURE — 3074F SYST BP LT 130 MM HG: CPT | Performed by: PHYSICIAN ASSISTANT

## 2024-12-03 PROCEDURE — 99204 OFFICE O/P NEW MOD 45 MIN: CPT | Performed by: PHYSICIAN ASSISTANT

## 2024-12-03 NOTE — PATIENT INSTRUCTIONS
Refill policies:    Allow 2-3 business days for refills; controlled substances may take longer.  Contact your pharmacy at least 5 days prior to running out of medication and have them send an electronic request or submit request through the “request refill” option in your [employer] account.  Refills are not addressed on weekends; covering physicians do not authorize routine medications on weekends.  No narcotics or controlled substances are refilled after noon on Fridays or by on call physicians.  By law, narcotics must be electronically prescribed.  A 30 day supply with no refills is the maximum allowed.  If your prescription is due for a refill, you may be due for a follow up appointment.  To best provide you care, patients receiving routine medications need to be seen at least once a year.  Patients receiving narcotic/controlled substance medications need to be seen at least once every 3 months.  In the event that your preferred pharmacy does not have the requested medication in stock (e.g. Backordered), it is your responsibility to find another pharmacy that has the requested medication available.  We will gladly send a new prescription to that pharmacy at your request.    Scheduling Tests:    If your physician has ordered radiology tests such as MRI or CT scans, please contact Central Scheduling at [phone] right away to schedule the test.  Once scheduled, the [facility] Centralized Referral Team will work with your insurance carrier to obtain pre-certification or prior authorization.  Depending on your insurance carrier, approval may take 3-10 days.  It is highly recommended patients assure they have received an authorization before having a test performed.  If test is done without insurance authorization, patient may be responsible for the entire amount billed.      Precertification and Prior Authorizations:  If your physician has recommended that you have a procedure or additional testing performed the [facility]  Centralized Referral Team will contact your insurance carrier to obtain pre-certification or prior authorization.    You are strongly encouraged to contact your insurance carrier to verify that your procedure/test has been approved and is a COVERED benefit.  Although the [facility] Centralized Referral Team does its due diligence, the insurance carrier gives the disclaimer that \"Although the procedure is authorized, this does not guarantee payment.\"    Ultimately the patient is responsible for payment.   Thank you for your understanding in this matter.  Paperwork Completion:  If you require FMLA or disability paperwork for your recovery, please make sure to either drop it off or have it faxed to our office at [phone]. Be sure the form has your name and date of birth on it.  The form will be faxed to our Forms Department and they will complete it for you.  There is a 25$ fee for all forms that need to be filled out.  Please be aware there is a 10-14 day turnaround time.  You will need to sign a release of information (KRISTINA) form if your paperwork does not come with one.  You may call the Forms Department with any questions at [phone].  Their fax number is [phone].

## 2024-12-03 NOTE — PROGRESS NOTES
Subjective:   Patient ID: Jesús Glez is a 44 year old male.    HPI    History/Other:   Review of Systems  Current Outpatient Medications   Medication Sig Dispense Refill   • naproxen 500 MG Oral Tab Take 1 tablet (500 mg total) by mouth 2 (two) times daily with meals for 14 days. 28 tablet 0   • WAL-ITIN D 24 HOUR  MG Oral Tablet 24 Hr TAKE ONE TABLET BY MOUTH EVERY DAY. 90 tablet 1   • Fluticasone Propionate 50 MCG/ACT Nasal Suspension 2 sprays by Nasal route daily. 3 each 3   • hydrOXYzine 10 MG Oral Tab TAKE 1 TABLET BY MOUTH EVERY NIGHT AT BEDTIME 90 tablet 3   • EPINEPHrine 0.3 MG/0.3ML Injection Solution Auto-injector USE AS DIRECTED FOR ANAPHYLAXIS, THEN CALL 911. Brand or generic ok. 1 each 3   • Calcium Carbonate Antacid 500 MG Oral Chew Tab Chew 1 tablet (500 mg total) by mouth as needed.     • ONE-A-DAY MENS OR Take 1 tablet by mouth daily.         Allergies:Allergies[1]    Objective:   Physical Exam  Constitutional:          Assessment & Plan:   No diagnosis found.    No orders of the defined types were placed in this encounter.      Meds This Visit:  Requested Prescriptions      No prescriptions requested or ordered in this encounter       Imaging & Referrals:  None    Location of Pain: center of [facility]    Date Pain Began: 9/15/24          Work Related:   No        Receiving Work Comp/Disability:   No    Numeric Rating Scale:  Pain at Present:  1/10                                                                                                            (No Pain) 0  to  10 (Worst Pain)                 Minimum Pain:   1  Maximum Pain  7    Distribution of Pain:    bilateral    Quality of Pain:   aching and sharp/stabbing    Origin of Pain:    unknown    Aggravating Factors:    Other standing up straight    Past Treatments for Current Pain Condition:   Physical Therapy and NSAIDS    Prior diagnostic testing for your pain:  xray          [1]   Allergies  Allergen Reactions   •  Bleomycin SHORTNESS OF BREATH and Tightness in Throat   • Nuts ANAPHYLAXIS   • Other ANAPHYLAXIS     Beans, legumes   • Radiology Contrast Iodinated Dyes OTHER (SEE COMMENTS)     Swollen uvula

## 2024-12-03 NOTE — PROGRESS NOTES
Patient: Jesús Glez  Medical Record Number: [id number]  Site: [facility]  Referring Physician:  Dr. Baig  PCP: same    Dear Dr. Baig:    Thank you very much for requesting this consultation. I had the opportunity to evaluate and initiate care for your patient today, as per your request.    HISTORY OF CHIEF COMPLAINT:      Jesús Glez is a 44 year old male, who complains of low back pain.    Patient is here today at the request of his primary care provider with pain in above-described distribution that began atraumatically 3 months ago.  He states that he is under the care of of oncology, Dr. Acosta, with a history of testicular cancer (seminoma, right, in remission).  States that he had initially discussed his symptoms with oncology, and was sent to physical therapy, though only began last week on 11/26/2024.  Has been to 1 session thus far, and is certainly still too early to assess its efficacy.  He was seen by his primary care provider, who sent him for an x-ray which was unremarkable, and was referred for further options.  He has been using NSAIDs, to temporary relief.      VAS Pain Score:  0-7/10    Aggravating Factors: Relieving Factors:   Standing  Lumbar extension  Lumbar flexion  NSAIDs     Past Treatment Attempted/Patient’s Response:  As above     Past Medical History:    Acute pharyngitis    Allergic rhinitis    Bronchitis    Cancer, testis, seminoma, right (HCC)    Dermatitis    Esophageal reflux    History of blood transfusion    2021    Hx of motion sickness    Personal history of antineoplastic chemotherapy    2021 - testicular cancer    Pneumonia due to organism    Tachycardia    \"Mild tachycardia\" since chemotherapy    Visual impairment    glasses      Past Surgical History:   Procedure Laterality Date    Hernia surgery  07/2019    Orchiectomy   Right 01/13/2021      Family History   Problem Relation Age of Onset    Heart Disease Father 61         angioplasty    Cancer Maternal Grandmother 59        colon    Cancer Maternal Grandfather 88        pancreatic      Social History     Socioeconomic History    Marital status: Single   Tobacco Use    Smoking status: Never    Smokeless tobacco: Never   Vaping Use    Vaping status: Never Used   Substance and Sexual Activity    Alcohol use: Yes     Comment: rarely    Drug use: No   Other Topics Concern    Caffeine Concern Yes     Comment: 3-4 per day    Exercise No      Current Medications:  Current Outpatient Medications   Medication Sig Dispense Refill    naproxen 500 MG Oral Tab Take 1 tablet (500 mg total) by mouth 2 (two) times daily with meals for 14 days. 28 tablet 0    WAL-ITIN D 24 HOUR  MG Oral Tablet 24 Hr TAKE ONE TABLET BY MOUTH EVERY DAY. 90 tablet 1    Fluticasone Propionate 50 MCG/ACT Nasal Suspension 2 sprays by Nasal route daily. 3 each 3    hydrOXYzine 10 MG Oral Tab TAKE 1 TABLET BY MOUTH EVERY NIGHT AT BEDTIME 90 tablet 3    EPINEPHrine 0.3 MG/0.3ML Injection Solution Auto-injector USE AS DIRECTED FOR ANAPHYLAXIS, THEN CALL 911. Brand or generic ok. 1 each 3    Calcium Carbonate Antacid 500 MG Oral Chew Tab Chew 1 tablet (500 mg total) by mouth as needed.      ONE-A-DAY MENS OR Take 1 tablet by mouth daily.            Functional Assessment: Patient reports that they are able to complete all of their ADL's such as eating, bathing, using the toilet, dressing and getting up from a bed or a chair independently.    Work History:  The patient currently works full time as .       REVIEW OF SYSTEMS:   10 point review of systems is otherwise negative,unless otherwise in HPI.      Radiology/Lab Test Reviewed:  XR L spine:    IMPRESSION:   Unremarkable radiographs of the lumbar spine     CBC:    Lab Results   Component Value Date    WBC 7.5 11/14/2024    WBC 8.5 05/15/2024    WBC 6.2 12/29/2023   No results found for: \"HEMOGLOBIN\"  Lab Results   Component Value Date    PLT  274.0 11/14/2024    .0 05/15/2024    .0 12/29/2023         PHYSICAL EXAMIMATION   PHYSICAL EXAMINATION: Jesús Glez is a 44 year old male who is observed sitting comfortably on a chair in the exam room alert and oriented times three. He looks consistent with his stated age.    Ht Readings from Last 1 Encounters:   11/14/24 68.5\"     Wt Readings from Last 1 Encounters:   11/14/24 209 lb 3.2 oz (94.9 kg)     The patient is well developed, well nourished, well muscled, obese. He moves independently from sitting to standing with ease.       Inspection:  No acute distress    Patient displays Antalgic gait, and is able to Normal heel walk, Normal toe walk.    Coordination:  Well-coordinated, fluent gait, able to engage in rapid alternating movements of upper and lower extremities.    ROM Lumbar Spine:  See chart below:  Motion Right (+ or -) Left (+ or -)   Lumbar Flexion - -   Lumbar Extension + +   Lumbar Bending + +   Lumbar Extension/ twisting motion - -     Lumbar/Sacral Integument:  Skin over lumbar sacral spine is intact without rashes, excoriations, lesions or erythema noted    Palpation:  See chart below:  Palpation of lumbar area Right (+ or -) Left (+ or -)   Lumbar facets - -   Lumbar paraspinals - -   Piriformis - -   SIJ - -   Trochanteric Bursa - -     Strength:  Strength of bilateral lower extremities grossly intact; 5/5 throughout    Sensation:  No sensory deficits noted on bilateral lower extremities to light touch    Tests:  Test Right (+ or -) Left (+ or -)   SLR - -   Elver’s     Babinski     Clonus       HEAD/NECK: Head is normocephalic, neck supple  EYES: EOMI, GARRY  SKIN EXAM: Skin is intact, head, neck, trunk and arms/legs. No rashes, mottling or ulcerations.  LYMPH EXAM: There is no lymph edema in either lower extremity.  VASCULAR EXAM: Pedal pulses are normal bilaterally, with good distal perfusion. No clubbing or cyanosis.  ABDOMINAL EXAM: Abdomen is soft without  masses palpated.  HEART: normal, regular, S1 and S2  LUNGS:CTA  MUSCULOSKELETAL: Smooth, pain-free ROM to bilat hips, ankles, and knees.     Do you have any known blood/bleeding disorders?  No  Does patient currently take blood thinners?   None  Does patient currently take any antibiotics?   No      Patient is currently on pain medications:  No  Reason pain medications are prescribed: N/A  Pain medications are prescribed by: N/A  [place] Prescription Monitoring review: N/A  DIRE: N/A  Treatment decision: N/A    MEDICAL DECISION MAKING:     Impression: Chronic low back pain, right seminoma status post chemo/radiation and orchiectomy    Greater than 3-month history of nonradiating low back pain with relatively benign x-ray of the lumbar spine.  Has found improvement with NSAIDs, though this is proving to be temporary.  He just began physical therapy last week, and has been to 1 session thus far, and is still too early to assess its efficacy.  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      On exam, does have pain with range of motion lumbar spine, as well as reproduction of pain to palpation of the spinous processes.  No focal sensory/motor deficits.  Negative straight leg raise.    Did discuss options, and given his history of metastatic disease, we will order an MRI of the lumbar spine.  Ideally would get this with contrast though he states that he has a contrast allergy and cannot have contrast.  As such, we will have it done without.  We did discuss p.o. steroid though he refuses.  He can continue with NSAIDs for now and has been encouraged to remain diligent with PT/HEP.    Plan: MRI lumbar spine, follow-up to discuss options.  Will contact him with any aggressive findings.  For now, continue NSAIDs and  PT/HEP.    The patient indicates understanding of these issues and agrees to the plan.      Thank you very much.     Respectfully yours,    ADIEL Alves

## 2024-12-09 ENCOUNTER — OFFICE VISIT (OUTPATIENT)
Dept: PHYSICAL THERAPY | Facility: HOSPITAL | Age: 44
End: 2024-12-09
Attending: INTERNAL MEDICINE
Payer: COMMERCIAL

## 2024-12-09 PROCEDURE — 97110 THERAPEUTIC EXERCISES: CPT

## 2024-12-09 NOTE — PROGRESS NOTES
Diagnosis:   Lower back pain         Referring Provider: Abdi Acosta  Date of Evaluation:    11/26/24    Precautions:  None Next MD visit:   none scheduled  Date of Surgery: n/a   Insurance Primary/Secondary: [employer] [place] PPO / N/A     # Auth Visits:  [id number]    Subjective: pt reports his pain has been about the same. He has been doing exercises every. He has an MRI December 30th    Pain: 1/10- on naproxen      Objective:   See table.       Assessment: Pt tolerated session well. Cues on maintain core bracing given. Demonstrated quick fatigue in glute and abdominal muscles. Required cues to relax upper trap during rows. Pt will continue to benefit from skilled PT in order to address deficits.       Goals:     -Within 5 visits, pt will be consistent and independent w/ HEP, thus facilitating recovery.   -Within 5 visits, pt will report no greater than 3/10 on a daily basis.   -Within 10 visits, pt will demonstrate proper core bracing, allowing for improved stability around spine.   -Within 10 visits, pt will demonstrate pain- free lumbar ROM, allowing hip to go from sitting to standing w/o c/o pain.     Plan: progress as able.   Date: 12/9/2024  TX#: 2/6 Date:                 TX#: 3/ Date:                 TX#: 4/ Date:                 TX#: 5/ Date:   Tx#: 6/   Ther ex   2x5 dead bugs   X10 straight leg raise w/ core bracing R/L   Isometric opp LE/UE contractions w/ swiss ball  SL hip abd in hooklying w/ GTB x 20 R/L   Lateral band walk w/ GTB x 20 ft R/L   Stir the pot x 30 both sides   Lateral step ups- x 20 R/L w/ 6in step   2x10 rows w/ 35 lbs.                             HEP:   Patient was instructed in and issued a HEP for:   Access Code: [email or web address]  URL: [email or web address]  Date: 11/26/2024  Prepared by: Phyllis Isaacs     Exercises  - Supine Dead Bug with Leg Extension  - 1 x daily - 7 x weekly - 2 sets - 10 reps  - Small Range Straight Leg Raise  - 1 x daily - 7 x weekly - 2 sets - 10 reps  -  Supine March with Resistance Band  - 1 x daily - 7 x weekly - 2 sets - 10 reps  -lateral band walk     Charges: x3 ther ex  (38 min)      Total Timed Treatment: 38 min  Total Treatment Time: 38 min

## 2024-12-12 ENCOUNTER — OFFICE VISIT (OUTPATIENT)
Dept: PHYSICAL THERAPY | Facility: HOSPITAL | Age: 44
End: 2024-12-12
Attending: INTERNAL MEDICINE
Payer: COMMERCIAL

## 2024-12-12 PROCEDURE — 97110 THERAPEUTIC EXERCISES: CPT

## 2024-12-12 PROCEDURE — 97140 MANUAL THERAPY 1/> REGIONS: CPT

## 2024-12-13 DIAGNOSIS — M54.50 CHRONIC MIDLINE LOW BACK PAIN WITHOUT SCIATICA: Primary | ICD-10-CM

## 2024-12-13 DIAGNOSIS — G89.29 CHRONIC MIDLINE LOW BACK PAIN WITHOUT SCIATICA: Primary | ICD-10-CM

## 2024-12-13 RX ORDER — MELOXICAM 7.5 MG/1
7.5 TABLET ORAL DAILY
Qty: 30 TABLET | Refills: 0 | Status: SHIPPED | OUTPATIENT
Start: 2024-12-13

## 2024-12-13 NOTE — TELEPHONE ENCOUNTER
Per Cat Conrad APN  Would recommend meloxicam 7.5mg daily PRN with food if he is needing to take Naproxen daily.   Spoke with Patient, discussed above recommendations  Agreed with the plan, Rx sent for approval

## 2024-12-13 NOTE — TELEPHONE ENCOUNTER
Medication requested: naproxen 500 MG Oral Tab   Dose:     Is patient requesting 30 or 90 day supply:      Pharmacy name/location:  [id number]  [place], [place] - [address] AT [facility] [address], [phone], [phone]

## 2024-12-13 NOTE — PROGRESS NOTES
Diagnosis:   Lower back pain         Referring Provider: Abdi Acosta  Date of Evaluation:    11/26/24    Precautions:  None Next MD visit:   none scheduled  Date of Surgery: n/a   Insurance Primary/Secondary: [employer] PPO / N/A     # Auth Visits:  [id number]    Subjective: pt reports he ran out of pain meds. His pain has increased w/o the meds but it still not as bad as it was before.     Pain: 1/10.  5/10 when standing.      Objective:   See table.       Assessment: Pt tolerated session well. Manual incorporated to decrease pain. Pt reported minimal pain w/ manual. Did not report pain during any exercises. Band walk added to HEP. Pt will continue to benefit from skilled PT in order to address deficits.       Goals:     -Within 5 visits, pt will be consistent and independent w/ HEP, thus facilitating recovery.   -Within 5 visits, pt will report no greater than 3/10 on a daily basis.   -Within 10 visits, pt will demonstrate proper core bracing, allowing for improved stability around spine.   -Within 10 visits, pt will demonstrate pain- free lumbar ROM, allowing hip to go from sitting to standing w/o c/o pain.     Plan: progress as able.   Date: 12/9/2024  TX#: 2/6 Date:   12/12/24              TX#: 3/6 Date:                 TX#: 4/ Date:                 TX#: 5/ Date:   Tx#: 6/   Ther ex   2x5 dead bugs   X10 straight leg raise w/ core bracing R/L   Isometric opp LE/UE contractions w/ swiss ball  SL hip abd in hooklying w/ GTB x 20 R/L   Lateral band walk w/ GTB x 20 ft R/L   Stir the pot x 30 both sides   Lateral step ups- x 20 R/L w/ 6in step   2x10 rows w/ 35 lbs.  Ther ex:   X10 dead bugs   2x10 rows in quadruped position w/ opp LE elevated   Isometric opp LE/UE contractions w/ swiss ball x7 R/L w/ 5 sec hold  Lateral band walk w/ RTB x 20 Ft palloff walk outs w/ 25 Lbs x 4 R/L            Manual:   Central Pas to lumbar spine grd 1-2                       HEP:   Patient was instructed in and issued a HEP for:   Access  Code: [email or web address]  URL: [email or web address]  Date: 11/26/2024  Prepared by: Phyllis Isaacs     Exercises  - Supine Dead Bug with Leg Extension  - 1 x daily - 7 x weekly - 2 sets - 10 reps  - Small Range Straight Leg Raise  - 1 x daily - 7 x weekly - 2 sets - 10 reps  - Supine March with Resistance Band  - 1 x daily - 7 x weekly - 2 sets - 10 reps  -lateral band walk     Charges: x2 ther ex  (30 min)  x1 manual (10 min)    Total Timed Treatment: 40 min  Total Treatment Time: 40 min

## 2024-12-16 ENCOUNTER — TELEPHONE (OUTPATIENT)
Dept: PAIN CLINIC | Facility: CLINIC | Age: 44
End: 2024-12-16

## 2024-12-16 NOTE — TELEPHONE ENCOUNTER
Patient wanted to let Enoc know he can not have MRI until 12/30.     Patient also states he is taking Meloxicam 7.5 MG Oral Tab but he is still in a lot of discomfort.  Patient asking if there is any other suggestions Enoc would have to help with the pain.

## 2024-12-17 ENCOUNTER — OFFICE VISIT (OUTPATIENT)
Dept: PHYSICAL THERAPY | Facility: HOSPITAL | Age: 44
End: 2024-12-17
Attending: INTERNAL MEDICINE
Payer: COMMERCIAL

## 2024-12-17 PROCEDURE — 97110 THERAPEUTIC EXERCISES: CPT

## 2024-12-17 NOTE — TELEPHONE ENCOUNTER
Contacted pt to relay info below. Pt states he had concerns about taking oral steroid d/t his hx of tachycardia. Pt states the meloxicam is making his pain tolerable but symptoms are worsening. Advised pt to continue w/ PT and confirmed that he is on the wait list. Pt states he has checked with outside imaging facilities and all are booked as well. Pt states he will contact the office if he is unable to tolerate pain and would like to try the MDP. Offered to schedule pt for a f/u after MRI, to which pt is agreeable. Advised pt to contact our office if date of MRI is changed so that we can change f/u as well.

## 2024-12-17 NOTE — TELEPHONE ENCOUNTER
Enoc Gomez PA  You10 hours ago (9:15 PM)       Continue with PT (think he has been to total of 3 session?).  See if he wanted to reonsider the steroid, get the MRI, and f/u to discuss possible injections based on findings.

## 2024-12-17 NOTE — PROGRESS NOTES
Diagnosis:   Lower back pain         Referring Provider: Abdi Acosta  Date of Evaluation:    11/26/24    Precautions:  None Next MD visit:   none scheduled  Date of Surgery: n/a   Insurance Primary/Secondary: [place] / N/A     # Auth Visits:  [id number]    Subjective: pt reports his pain came back after ending the pain medication. Has been on meloxicam. States he feels pain has been increased since being off of pain med.     Pain: 1/10.  5/10 when standing.- on meloxicam      Objective:   See table.       Assessment: Pt continues to report increase in back pain since being off of pain medication. Also reports being unable to tolerate many core exercise, such as dead bug, due to previous hernia surgery. Advised to continue w/ HEP and take more rest break during if core exercises were causing abdominal discomfort. MRI scheduled for dec 30th.  Pt will continue to benefit from skilled PT in order to address deficits.       Goals:     -Within 5 visits, pt will be consistent and independent w/ HEP, thus facilitating recovery.   -Within 5 visits, pt will report no greater than 3/10 on a daily basis.   -Within 10 visits, pt will demonstrate proper core bracing, allowing for improved stability around spine.   -Within 10 visits, pt will demonstrate pain- free lumbar ROM, allowing hip to go from sitting to standing w/o c/o pain.     Plan: progress as able.   Date: 12/9/2024  TX#: 2/6 Date:   12/12/24              TX#: 3/6 Date:   12/16/24              TX#: 4/6 Date:                 TX#: 5/ Date:   Tx#: 6/   Ther ex   2x5 dead bugs   X10 straight leg raise w/ core bracing R/L   Isometric opp LE/UE contractions w/ swiss ball  SL hip abd in hooklying w/ GTB x 20 R/L   Lateral band walk w/ GTB x 20 ft R/L   Stir the pot x 30 both sides   Lateral step ups- x 20 R/L w/ 6in step   2x10 rows w/ 35 lbs.  Ther ex:   X10 dead bugs   2x10 rows in quadruped position w/ opp LE elevated   Isometric opp LE/UE contractions w/ swiss ball x7  R/L w/ 5 sec hold  Lateral band walk w/ RTB x 20 Ft palloff walk outs w/ 25 Lbs x 4 R/L      Ther ex:   LTR w/ swiss ball x15   X12 knee to chest w/ SB  X20 marches R/L in hooklying w/ GTB   SL hip abd in hooklying w/ GTB x 25 R/L   Seated marches on SB           Manual:   Central Pas to lumbar spine grd 1-2  --                     HEP:   Patient was instructed in and issued a HEP for:   Access Code: [email or web address]  URL: [email or web address]  Date: 11/26/2024  Prepared by: Phyllis Isaacs     Exercises  - Supine Dead Bug with Leg Extension  - 1 x daily - 7 x weekly - 2 sets - 10 reps  - Small Range Straight Leg Raise  - 1 x daily - 7 x weekly - 2 sets - 10 reps  - Supine March with Resistance Band  - 1 x daily - 7 x weekly - 2 sets - 10 reps  - lateral band walk     Charges: x2 ther ex  (30 min)   Total Timed Treatment: 30 min  Total Treatment Time: 30 min

## 2024-12-20 ENCOUNTER — OFFICE VISIT (OUTPATIENT)
Dept: PHYSICAL THERAPY | Facility: HOSPITAL | Age: 44
End: 2024-12-20
Attending: INTERNAL MEDICINE
Payer: COMMERCIAL

## 2024-12-20 PROCEDURE — 97110 THERAPEUTIC EXERCISES: CPT

## 2024-12-20 NOTE — PROGRESS NOTES
Diagnosis:   Lower back pain         Referring Provider: Abdi Acosta  Date of Evaluation:    11/26/24    Precautions:  None Next MD visit:   none scheduled  Date of Surgery: n/a   Insurance Primary/Secondary: [place] / N/[place]     # Auth Visits:  [id number]    Subjective: pt reports he has been about the same. Still on meloxicam.     Pain: 1.5/10.  4/10 when standing.- on meloxicam      Objective:   See table.       Assessment: Pt continues to have increased back pain even with meloxicam. Core strength was able to be progressed w/o significant increase in back or abdominal pain. Discussed POC. Pt has MRI dec 30th after after recieving results from that, he will decide with dr if PT continues to be appropriate.     Goals: assessed 12/20/24    -Within 5 visits, pt will be consistent and independent w/ HEP, thus facilitating recovery. - met   -Within 5 visits, pt will report no greater than 3/10 on a daily basis. - in progress   -Within 10 visits, pt will demonstrate proper core bracing, allowing for improved stability around spine. - met   -Within 10 visits, pt will demonstrate pain- free lumbar ROM, allowing hip to go from sitting to standing w/o c/o pain. In progress     Plan: progress as able.   Date: 12/9/2024  TX#: 2/6 Date:   12/12/24              TX#: 3/6 Date:   12/16/24              TX#: 4/6 Date:  12/20/24               TX#: 5/6 Date:   Tx#: 6/   Ther ex   2x5 dead bugs   X10 straight leg raise w/ core bracing R/L   Isometric opp LE/UE contractions w/ swiss ball  SL hip abd in hooklying w/ GTB x 20 R/L   Lateral band walk w/ GTB x 20 ft R/L   Stir the pot x 30 both sides   Lateral step ups- x 20 R/L w/ 6in step   2x10 rows w/ 35 lbs.  Ther ex:   X10 dead bugs   2x10 rows in quadruped position w/ opp LE elevated   Isometric opp LE/UE contractions w/ swiss ball x7 R/L w/ 5 sec hold  Lateral band walk w/ RTB x 20 Ft palloff walk outs w/ 25 Lbs x 4 R/L      Ther ex:   LTR w/ swiss ball x15   X12 knee to chest w/  SB  X20 marches R/L in hooklying w/ GTB   SL hip abd in hooklying w/ GTB x 25 R/L   Seated marches on SB      Ther ex:   LTR w/ swiss ball x15  X20 knee to chest w/ SB   Swiss ball squeezes R/L w/ opp LE/UE x10 w/ 5 sec hold  Knee to chest w. Hip add iso and tra bracing x10   Lat pulldowns x15 R/L w/ red TB   Stir the pot x 30 both sides w/ red band   Hip abd w/ RTB x 15 R/L          Manual:   Central Pas to lumbar spine grd 1-2  -- --                    HEP:   Patient was instructed in and issued a HEP for:   Access Code: [email or web address]  URL: [email or web address]  Date: 11/26/2024  Prepared by: Phyllis Isaacs     Exercises  - Supine Dead Bug with Leg Extension  - 1 x daily - 7 x weekly - 2 sets - 10 reps  - Small Range Straight Leg Raise  - 1 x daily - 7 x weekly - 2 sets - 10 reps  - Supine March with Resistance Band  - 1 x daily - 7 x weekly - 2 sets - 10 reps  - lateral band walk     Charges: x2 ther ex  (30 min)   Total Timed Treatment: 30 min  Total Treatment Time: 30 min

## 2024-12-23 ENCOUNTER — OFFICE VISIT (OUTPATIENT)
Dept: PHYSICAL THERAPY | Facility: HOSPITAL | Age: 44
End: 2024-12-23
Attending: INTERNAL MEDICINE
Payer: COMMERCIAL

## 2024-12-23 PROCEDURE — 97110 THERAPEUTIC EXERCISES: CPT | Performed by: PHYSICAL THERAPY ASSISTANT

## 2024-12-23 PROCEDURE — 97140 MANUAL THERAPY 1/> REGIONS: CPT | Performed by: PHYSICAL THERAPY ASSISTANT

## 2024-12-23 NOTE — PROGRESS NOTES
Diagnosis:   Lower back pain         Referring Provider: Abdi Acosta  Date of Evaluation:    11/26/24    Precautions:  None Next MD visit:   none scheduled  Date of Surgery: n/a   Insurance Primary/Secondary: [place] / N/A     # Auth Visits:  [id number]    Subjective: Things feel the same - not moving in the right direction.  Anxious to take MDP due to tachycardia. Having been doing the exercises - not feeling any improvement. If anything it is getting worse. Extension hurts - relief from forward flexion.     Pain: 1.5/10 when sitting      5-6 /10 when standing - on meloxicam which is not providing relief at this time.       Objective:   Pt in forward flexed position, guarded. Shoulders raised. Unable to sit in extended position without significant increase in pain.       Assessment:  Pt continues to have increased back pain even with meloxicam. Attempted manual therapy with gentle mobilizations with pain and TTP over SI area.  Pt reports discomfort in extended position. Pt reports flexed position beneficial. 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 Abductor weakness demonstrated in SL - therefore band not used this session in order to address form.   Able to complete exercises with slow and deliberate motions w/o significant increase in back or abdominal pain nor improvement. Discussed POC. Pt has MRI dec 30th after after recieving results from that, he will decide with dr if PT continues to be appropriate.     Goals: assessed 12/20/24    -Within 5 visits, pt will be consistent and independent w/ HEP, thus facilitating recovery. - met   -Within 5 visits, pt will report no greater than 3/10 on a daily basis. - in progress   -Within 10 visits, pt will demonstrate proper core bracing, allowing for improved stability around spine. - met   -Within 10 visits, pt will demonstrate pain- free lumbar ROM, allowing hip to go from sitting to standing w/o c/o pain. In  progress     Plan: progress as able.   Date: 12/9/2024  TX#: 2/6 Date:   12/12/24              TX#: 3/6 Date:   12/16/24              TX#: 4/6 Date:  12/20/24               TX#: 5/6 Date: 12/23/2024   Tx#: 6/ 6   Ther ex   2x5 dead bugs   X10 straight leg raise w/ core bracing R/L   Isometric opp LE/UE contractions w/ swiss ball  SL hip abd in hooklying w/ GTB x 20 R/L   Lateral band walk w/ GTB x 20 ft R/L   Stir the pot x 30 both sides   Lateral step ups- x 20 R/L w/ 6in step   2x10 rows w/ 35 lbs.  Ther ex:   X10 dead bugs   2x10 rows in quadruped position w/ opp LE elevated   Isometric opp LE/UE contractions w/ swiss ball x7 R/L w/ 5 sec hold  Lateral band walk w/ RTB x 20 Ft palloff walk outs w/ 25 Lbs x 4 R/L      Ther ex:   LTR w/ swiss ball x15   X12 knee to chest w/ SB  X20 marches R/L in hooklying w/ GTB   SL hip abd in hooklying w/ GTB x 25 R/L   Seated marches on SB      Ther ex:   LTR w/ swiss ball x15  X20 knee to chest w/ SB   Swiss ball squeezes R/L w/ opp LE/UE x10 w/ 5 sec hold  Knee to chest w. Hip add iso and tra bracing x10   Lat pulldowns x15 R/L w/ red TB   Stir the pot x 30 both sides w/ red band   Hip abd w/ RTB x 15 R/L      Ther ex  30 mins   Cat cow  x 10 - some pain in  both directions   LTR w Swiss ball x 15   DKTC w RSB x 20   HSS w green strap 3 x 30 secs NEW  SL abduction - no band as pt demonstrates some loss of form. 2 x 10 B   Seated forward flexion rolls w Green Swiss ball x 10     Manual:   Central Pas to lumbar spine grd 1-2  -- -- Manual  10 mins   Central Pas to lumbar spine grd 1-2 for pain modulation                   HEP:   Patient was instructed in and issued a HEP for:   Access Code: [email or web address]  URL: [email or web address]  Date: 11/26/2024  Prepared by: Phyllis Isaacs     Exercises  - Supine Dead Bug with Leg Extension  - 1 x daily - 7 x weekly - 2 sets - 10 reps  - Small Range Straight Leg Raise  - 1 x daily - 7 x weekly - 2 sets - 10 reps  - Supine March  with Resistance Band  - 1 x daily - 7 x weekly - 2 sets - 10 reps  - lateral band walk     Charges: x2 ther ex  (30 min)  Manual x 1 10 mins Total Timed Treatment: 40 min  Total Treatment Time: 40 min

## 2024-12-24 PROBLEM — C62.91 SEMINOMA OF RIGHT TESTIS (HCC): Status: RESOLVED | Noted: 2021-02-04 | Resolved: 2024-12-24

## 2024-12-30 ENCOUNTER — OFFICE VISIT (OUTPATIENT)
Dept: INTERNAL MEDICINE CLINIC | Facility: CLINIC | Age: 44
End: 2024-12-30
Payer: COMMERCIAL

## 2024-12-30 ENCOUNTER — HOSPITAL ENCOUNTER (OUTPATIENT)
Dept: MRI IMAGING | Facility: HOSPITAL | Age: 44
Discharge: HOME | End: 2024-12-30
Attending: PHYSICIAN ASSISTANT
Payer: COMMERCIAL

## 2024-12-30 VITALS
SYSTOLIC BLOOD PRESSURE: 124 MMHG | BODY MASS INDEX: 31.46 KG/M2 | OXYGEN SATURATION: 99 % | HEIGHT: 68.5 IN | HEART RATE: 87 BPM | DIASTOLIC BLOOD PRESSURE: 80 MMHG | WEIGHT: 210 LBS | TEMPERATURE: 97 F | RESPIRATION RATE: 16 BRPM

## 2024-12-30 DIAGNOSIS — M54.50 CHRONIC RIGHT-SIDED LOW BACK PAIN WITHOUT SCIATICA: ICD-10-CM

## 2024-12-30 DIAGNOSIS — Z00.00 ROUTINE GENERAL MEDICAL EXAMINATION AT A HEALTH CARE FACILITY: ICD-10-CM

## 2024-12-30 DIAGNOSIS — C62.91 SEMINOMA OF RIGHT TESTIS (HCC): ICD-10-CM

## 2024-12-30 DIAGNOSIS — G89.29 CHRONIC MIDLINE LOW BACK PAIN WITHOUT SCIATICA: ICD-10-CM

## 2024-12-30 DIAGNOSIS — G89.29 CHRONIC RIGHT-SIDED LOW BACK PAIN WITHOUT SCIATICA: ICD-10-CM

## 2024-12-30 DIAGNOSIS — Z00.00 ANNUAL PHYSICAL EXAM: Primary | ICD-10-CM

## 2024-12-30 DIAGNOSIS — M54.50 CHRONIC MIDLINE LOW BACK PAIN WITHOUT SCIATICA: ICD-10-CM

## 2024-12-30 PROCEDURE — 72148 MRI LUMBAR SPINE W/O DYE: CPT | Performed by: PHYSICIAN ASSISTANT

## 2024-12-30 RX ORDER — METHYLPREDNISOLONE 4 MG/1
TABLET ORAL
Qty: 1 EACH | Refills: 0 | Status: SHIPPED | OUTPATIENT
Start: 2024-12-30

## 2024-12-30 NOTE — PROGRESS NOTES
Subjective:   Patient ID: Jesús Glez is a 44 year old male.    HPI  Jesús Glez is a 44 year old male who presents for a complete physical exam.   HPI:   Pt complains of nothing today  Continues to follow with Dr Moser for follow up of testicular seminoma and Dr Laird for management of his allergies    Seeing spine specialist for eval of chronic lumbar pain    PAST MEDICAL, SOCIAL, FAMILY HISTORIES REVIEWED WITH PT      Immunization History   Administered Date(s) Administered    Covid-19 Vaccine Pfizer 30 mcg/0.3 ml 05/25/2021, 06/15/2021, 11/19/2021    FLULAVAL 6 months & older 0.5 ml Prefilled syringe (30435) 10/21/2019, 10/02/2020, 10/15/2021, 10/20/2022    FLUZONE 6 months and older PFS 0.5 ml (63885) 10/16/2018, 10/21/2019, 10/02/2020, 10/15/2021    Influenza 10/06/2017, 10/15/2023    Pneumococcal Conjugate PCV20 12/22/2022    Pneumovax 23 12/21/2021    TDAP 04/05/2016     Wt Readings from Last 6 Encounters:   12/30/24 210 lb (95.3 kg)   11/14/24 209 lb 3.2 oz (94.9 kg)   05/15/24 206 lb (93.4 kg)   12/28/23 201 lb (91.2 kg)   11/15/23 206 lb 8 oz (93.7 kg)   09/13/23 208 lb (94.3 kg)     Body mass index is 31.47 kg/m².     Lab Results   Component Value Date    GLU 97 11/14/2024    GLU 94 05/15/2024    GLU 90 12/29/2023     Lab Results   Component Value Date    CHOLEST 170 12/29/2023    CHOLEST 169 01/07/2023    CHOLEST 169 11/26/2021     Lab Results   Component Value Date    HDL 42 12/29/2023    HDL 43 01/07/2023    HDL 36 (L) 11/26/2021     Lab Results   Component Value Date     (H) 12/29/2023     (H) 01/07/2023     (H) 11/26/2021     Lab Results   Component Value Date    AST 17 11/14/2024    AST 17 05/15/2024    AST 10 (L) 12/29/2023     Lab Results   Component Value Date    ALT 15 11/14/2024    ALT 26 05/15/2024    ALT 22 12/29/2023     No results found for: \"PSA\"     Current Outpatient Medications   Medication Sig Dispense Refill    methylPREDNISolone (MEDROL)  4 MG Oral Tablet Therapy Pack As directed. 1 each 0    Meloxicam 7.5 MG Oral Tab Take 1 tablet (7.5 mg total) by mouth daily. 30 tablet 0    WAL-ITIN D 24 HOUR  MG Oral Tablet 24 Hr TAKE ONE TABLET BY MOUTH EVERY DAY. 90 tablet 1    Fluticasone Propionate 50 MCG/ACT Nasal Suspension 2 sprays by Nasal route daily. 3 each 3    hydrOXYzine 10 MG Oral Tab TAKE 1 TABLET BY MOUTH EVERY NIGHT AT BEDTIME 90 tablet 3    Calcium Carbonate Antacid 500 MG Oral Chew Tab Chew 1 tablet (500 mg total) by mouth as needed.      ONE-A-DAY MENS OR Take 1 tablet by mouth daily.          Past Medical History:    Acute pharyngitis    Allergic rhinitis    Bronchitis    Cancer, testis, seminoma, right (HCC)    Dermatitis    Esophageal reflux    History of blood transfusion    2021    Hx of motion sickness    Personal history of antineoplastic chemotherapy    2021 - testicular cancer    Pneumonia due to organism    Tachycardia    \"Mild tachycardia\" since chemotherapy    Visual impairment    glasses      Past Surgical History:   Procedure Laterality Date    Hernia surgery  07/2019    Orchiectomy   Right 01/13/2021      Family History   Problem Relation Age of Onset    Heart Disease Father 61        angioplasty    Cancer Maternal Grandmother 59        colon    Cancer Maternal Grandfather 88        pancreatic      Social History:  Social History     Socioeconomic History    Marital status: Single   Tobacco Use    Smoking status: Never    Smokeless tobacco: Never   Vaping Use    Vaping status: Never Used   Substance and Sexual Activity    Alcohol use: Yes     Comment: rarely    Drug use: No   Other Topics Concern    Caffeine Concern Yes     Comment: 3-4 per day    Exercise No   Social History Narrative    Single, lives alone.  Works as an .  No children.  Has a brother who lives in [place].  His parents live in [place].        Exercise: minimal, once per week.  Diet: watches minimally     REVIEW OF SYSTEMS:   A  comprehensive 10 point review of systems was completed.     Pertinent positives and negatives noted in the HPI.      EXAM:   /80   Pulse 87   Temp 97.2 °F (36.2 °C)   Resp 16   Ht 5' 8.5\" (1.74 m)   Wt 210 lb (95.3 kg)   SpO2 99%   BMI 31.47 kg/m²   Body mass index is 31.47 kg/m².   GENERAL: well developed, well nourished,in no apparent distress  SKIN: no rashes,no suspicious lesions  HEENT: atraumatic, normocephalic,ears and throat are clear  EYES:PERRLA, EOMI, conjunctiva are clear  NECK: supple,no adenopathy,no bruits  LUNGS: clear to auscultation  CARDIO: RRR without murmur  GI: good BS's,no masses, HSM or tenderness  : deferred  MUSCULOSKELETAL: back is not tender  EXTREMITIES: no cyanosis, clubbing or edema  NEURO: Oriented times three,motor and sensory are grossly intact    ASSESSMENT AND PLAN:   Jesús Glez is a 44 year old male who presents for a complete physical exam.  Body mass index is 31.47 kg/m²., recommended low fat diet and aerobic exercise 30 minutes three times weekly.   Health maintenance, will check fasting Lipids, CMP, CBC    Pt info handouts given for: exercise, low fat diet,    The patient indicates understanding of these issues and agrees to the plan.  The patient is asked to return for CPX in 13 m.    History/Other:   Review of Systems  Current Outpatient Medications   Medication Sig Dispense Refill    methylPREDNISolone (MEDROL) 4 MG Oral Tablet Therapy Pack As directed. 1 each 0    Meloxicam 7.5 MG Oral Tab Take 1 tablet (7.5 mg total) by mouth daily. 30 tablet 0    WAL-ITIN D 24 HOUR  MG Oral Tablet 24 Hr TAKE ONE TABLET BY MOUTH EVERY DAY. 90 tablet 1    Fluticasone Propionate 50 MCG/ACT Nasal Suspension 2 sprays by Nasal route daily. 3 each 3    hydrOXYzine 10 MG Oral Tab TAKE 1 TABLET BY MOUTH EVERY NIGHT AT BEDTIME 90 tablet 3    Calcium Carbonate Antacid 500 MG Oral Chew Tab Chew 1 tablet (500 mg total) by mouth as needed.      ONE-A-DAY MENS OR Take  1 tablet by mouth daily.         Allergies:Allergies[1]    Objective:   Physical Exam    Assessment & Plan:   1. Annual physical exam    2. Routine general medical examination at a health care facility    3. Chronic right-sided low back pain without sciatica        Orders Placed This Encounter   Procedures    CBC With Differential With Platelet    Comp Metabolic Panel (14)    Lipid Panel    TSH W Reflex To Free T4    Urinalysis, Routine       Meds This Visit:  Requested Prescriptions     Signed Prescriptions Disp Refills    methylPREDNISolone (MEDROL) 4 MG Oral Tablet Therapy Pack 1 each 0     Sig: As directed.       Imaging & Referrals:  None         [1]   Allergies  Allergen Reactions    Bleomycin SHORTNESS OF BREATH and Tightness in Throat    Nuts ANAPHYLAXIS    Other ANAPHYLAXIS     Beans, legumes    Radiology Contrast Iodinated Dyes OTHER (SEE COMMENTS)     Swollen uvula

## 2025-01-06 ENCOUNTER — TELEPHONE (OUTPATIENT)
Dept: PAIN CLINIC | Facility: CLINIC | Age: 45
End: 2025-01-06

## 2025-01-06 NOTE — TELEPHONE ENCOUNTER
----- Message from Enoc Gomez sent at 1/6/2025  8:59 AM CST -----  No areas of metastatic disease identified, and no areas of meaningful degenerative changes (just mild).  Please let him know, and can continue with PT and follow up if LBP persists despite conservative management.

## 2025-01-06 NOTE — TELEPHONE ENCOUNTER
I contacted the patient at this time and gave the MRI results and recommendation. According to the patient, her PCP prescribed MDP on December 30th, and he took the last dose yesterday. According to the patient, the pain returned as soon as the dose was reduced. PT states that he is still completing the exercises that PT provided him, but he does not believe the exercises are beneficial. Pt is planned to follow up with Enoc tomorrow. I advised the patient to come to his follow-up appointment so that Enoc could discuss the next plan of care. Pt vu, and no additional inquiries at this moment.

## 2025-01-07 ENCOUNTER — OFFICE VISIT (OUTPATIENT)
Dept: PAIN CLINIC | Facility: CLINIC | Age: 45
End: 2025-01-07
Payer: COMMERCIAL

## 2025-01-07 VITALS — DIASTOLIC BLOOD PRESSURE: 62 MMHG | OXYGEN SATURATION: 99 % | HEART RATE: 80 BPM | SYSTOLIC BLOOD PRESSURE: 118 MMHG

## 2025-01-07 DIAGNOSIS — M47.816 LUMBAR SPONDYLOSIS: Primary | ICD-10-CM

## 2025-01-07 PROCEDURE — 99214 OFFICE O/P EST MOD 30 MIN: CPT | Performed by: PHYSICIAN ASSISTANT

## 2025-01-07 PROCEDURE — 3074F SYST BP LT 130 MM HG: CPT | Performed by: PHYSICIAN ASSISTANT

## 2025-01-07 PROCEDURE — 3078F DIAST BP <80 MM HG: CPT | Performed by: PHYSICIAN ASSISTANT

## 2025-01-07 NOTE — PROGRESS NOTES
HPI:   Jesús Glez presents with complaints of Low back pain.    The pain is described as moderate aching that is intermittent.  The patient’s activity level has remained the same since last visit.  The pain is worst unrelated to time of day.    Changes in condition/history since last visit: pt is here today for follow up, having been seen once previously on 12/3/2024.  At that time had been sent for physical therapy and an MRI.  He has since initiated physical therapy, to some amount of relief.  He had been offered a round of oral steroid though refused.  Eventually he did try the steroid through his primary care physician, which had been significantly effective, though as he tapered dose, some of his pain has returned.  MRI has been conducted and is free from any advanced findings (see below).    Last procedure: N/A    date: N/A    Percentage of relief experienced from the procedure: N/A %    Duration of the relief: N/A    The following activities will increase the patient’s pain: standing    The following activities decrease the patient’s pain: limiting activity level    Functional Assessment: Patient reports that they are able to complete all of their ADL's such as eating, bathing, using the toilet, dressing and getting up from a bed or a chair independently.    Current Medications:  Current Outpatient Medications   Medication Sig Dispense Refill    methylPREDNISolone (MEDROL) 4 MG Oral Tablet Therapy Pack As directed. 1 each 0    Meloxicam 7.5 MG Oral Tab Take 1 tablet (7.5 mg total) by mouth daily. 30 tablet 0    WAL-ITIN D 24 HOUR  MG Oral Tablet 24 Hr TAKE ONE TABLET BY MOUTH EVERY DAY. 90 tablet 1    Fluticasone Propionate 50 MCG/ACT Nasal Suspension 2 sprays by Nasal route daily. 3 each 3    hydrOXYzine 10 MG Oral Tab TAKE 1 TABLET BY MOUTH EVERY NIGHT AT BEDTIME 90 tablet 3    Calcium Carbonate Antacid 500 MG Oral Chew Tab Chew 1 tablet (500 mg total) by mouth as needed.      ONE-A-DAY  MENS OR Take 1 tablet by mouth daily.          Patient requires assistance with: No assistance required    Reviewed Patient History Dated: 12/3/24 no changes noted    Physical Exam:   There were no vitals taken for this visit.  VAS Pain Score:  5/10  General Appearance: Well developed, Well nourished, Independent body habitus, Well groomed, Overweight build    Neurological Exam: WNL-Orientation to time, place and person, normal mood & effect, normal concentration & attention span  Inspection: non-antalgic, no acute distress  Pain to palpation bilateral inferior lumbar facets  Pain with ROM L spine:  extension and rotational extension   Radiology/Lab Test Reviewed: MRI L spine 12/30/24:  CONCLUSION:    1. No acute process.   2. There is mild multilevel facet arthropathy.   3. Mild annular disc bulging at L4-5.   4. No significant stenosis within the lumbar spine noted.   5. Normal appearance of the visualized aspects of the lower spinal cord, conus and cauda equina.     Lab Results   Component Value Date    WBC 7.5 11/14/2024    WBC 8.5 05/15/2024    WBC 6.2 12/29/2023   No results found for: \"HEMOGLOBIN\"  Lab Results   Component Value Date    .0 11/14/2024    .0 05/15/2024    .0 12/29/2023     Do you have any known blood/bleeding disorders?  No  Does patient currently take blood thinners?   None  Does patient currently take any antibiotics?   No  Patient educated and verbalized understanding.  Medical Decision Making:   Diagnosis:    Encounter Diagnosis   Name Primary?    Lumbar spondylosis Yes       Impression: Some mild, gradual improvement with physical therapy, and is diligent with his HEP.  Had profound relief with p.o. steroid, though after tapering the dose, much of this benefit has waned, though continues to be 25% improved over baseline.  Given his history of seminoma, had ordered an MRI of the lumbar spine though this was clear of any evidence of metastatic disease.  Did reveal some  mild degenerative changes, and his low back pain is likely coming from the degenerative changes of the facets that are identified on MRI.  We did discuss options, to include bilateral lumbar medial branch block and staging for possible RFA, though does not feel as though his current level of pain would warrant this.  Encouraged him to remain diligent with his HEP, and if pain becomes more prominent, contact clinic and we will be happy to place order for the aforementioned MB NB.    Plan: Patient to follow up PRN.    No orders of the defined types were placed in this encounter.      Meds & Refills for this Visit:  Requested Prescriptions      No prescriptions requested or ordered in this encounter       Imaging & Consults:  None    The patient indicates understanding of these issues and agrees to the plan.    ADIEL Alves

## 2025-01-07 NOTE — PROGRESS NOTES
Patient presents in office today with reported pain in center of low back    Current pain level reported = 4.5-5/10    Last reported dose of pt finished medrol pack 2 days ago      Narcotic Contract renewal n/a    Urine Drug screen n/a

## 2025-01-07 NOTE — PATIENT INSTRUCTIONS
Refill policies:    Allow 2-3 business days for refills; controlled substances may take longer.  Contact your pharmacy at least 5 days prior to running out of medication and have them send an electronic request or submit request through the “request refill” option in your [employer] account.  Refills are not addressed on weekends; covering physicians do not authorize routine medications on weekends.  No narcotics or controlled substances are refilled after noon on Fridays or by on call physicians.  By law, narcotics must be electronically prescribed.  A 30 day supply with no refills is the maximum allowed.  If your prescription is due for a refill, you may be due for a follow up appointment.  To best provide you care, patients receiving routine medications need to be seen at least once a year.  Patients receiving narcotic/controlled substance medications need to be seen at least once every 3 months.  In the event that your preferred pharmacy does not have the requested medication in stock (e.g. Backordered), it is your responsibility to find another pharmacy that has the requested medication available.  We will gladly send a new prescription to that pharmacy at your request.    Scheduling Tests:    If your physician has ordered radiology tests such as MRI or CT scans, please contact Central Scheduling at [phone] right away to schedule the test.  Once scheduled, the [facility] Centralized Referral Team will work with your insurance carrier to obtain pre-certification or prior authorization.  Depending on your insurance carrier, approval may take 3-10 days.  It is highly recommended patients assure they have received an authorization before having a test performed.  If test is done without insurance authorization, patient may be responsible for the entire amount billed.      Precertification and Prior Authorizations:  If your physician has recommended that you have a procedure or additional testing performed the [facility]  Centralized Referral Team will contact your insurance carrier to obtain pre-certification or prior authorization.    You are strongly encouraged to contact your insurance carrier to verify that your procedure/test has been approved and is a COVERED benefit.  Although the [facility] Centralized Referral Team does its due diligence, the insurance carrier gives the disclaimer that \"Although the procedure is authorized, this does not guarantee payment.\"    Ultimately the patient is responsible for payment.   Thank you for your understanding in this matter.  Paperwork Completion:  If you require FMLA or disability paperwork for your recovery, please make sure to either drop it off or have it faxed to our office at [phone]. Be sure the form has your name and date of birth on it.  The form will be faxed to our Forms Department and they will complete it for you.  There is a 25$ fee for all forms that need to be filled out.  Please be aware there is a 10-14 day turnaround time.  You will need to sign a release of information (KRISTINA) form if your paperwork does not come with one.  You may call the Forms Department with any questions at [phone].  Their fax number is [phone].

## 2025-01-18 ENCOUNTER — LAB ENCOUNTER (OUTPATIENT)
Dept: LAB | Facility: HOSPITAL | Age: 45
End: 2025-01-18
Attending: INTERNAL MEDICINE
Payer: COMMERCIAL

## 2025-01-18 DIAGNOSIS — Z00.00 ROUTINE GENERAL MEDICAL EXAMINATION AT A HEALTH CARE FACILITY: ICD-10-CM

## 2025-01-18 LAB
ALBUMIN SERPL-MCNC: 4.8 G/DL (ref 3.2–4.8)
ALBUMIN/GLOB SERPL: 2.1 {RATIO} (ref 1–2)
ALP LIVER SERPL-CCNC: 43 U/L
ALT SERPL-CCNC: 14 U/L
ANION GAP SERPL CALC-SCNC: 6 MMOL/L (ref 0–18)
AST SERPL-CCNC: 16 U/L (ref ?–34)
BASOPHILS # BLD AUTO: 0.03 X10(3) UL (ref 0–0.2)
BASOPHILS NFR BLD AUTO: 0.5 %
BILIRUB SERPL-MCNC: 0.6 MG/DL (ref 0.3–1.2)
BILIRUB UR QL STRIP.AUTO: NEGATIVE
BUN BLD-MCNC: 23 MG/DL (ref 9–23)
CALCIUM BLD-MCNC: 9.4 MG/DL (ref 8.7–10.6)
CHLORIDE SERPL-SCNC: 105 MMOL/L (ref 98–112)
CHOLEST SERPL-MCNC: 204 MG/DL (ref ?–200)
CLARITY UR REFRACT.AUTO: CLEAR
CO2 SERPL-SCNC: 30 MMOL/L (ref 21–32)
COLOR UR AUTO: YELLOW
CREAT BLD-MCNC: 1.21 MG/DL
EGFRCR SERPLBLD CKD-EPI 2021: 76 ML/MIN/1.73M2 (ref 60–?)
EOSINOPHIL # BLD AUTO: 0.14 X10(3) UL (ref 0–0.7)
EOSINOPHIL NFR BLD AUTO: 2.3 %
ERYTHROCYTE [DISTWIDTH] IN BLOOD BY AUTOMATED COUNT: 13.2 %
FASTING PATIENT LIPID ANSWER: YES
FASTING STATUS PATIENT QL REPORTED: YES
GLOBULIN PLAS-MCNC: 2.3 G/DL (ref 2–3.5)
GLUCOSE BLD-MCNC: 91 MG/DL (ref 70–99)
GLUCOSE UR STRIP.AUTO-MCNC: NORMAL MG/DL
HCT VFR BLD AUTO: 42.3 %
HDLC SERPL-MCNC: 50 MG/DL (ref 40–59)
HGB BLD-MCNC: 14.5 G/DL
IMM GRANULOCYTES # BLD AUTO: 0.02 X10(3) UL (ref 0–1)
IMM GRANULOCYTES NFR BLD: 0.3 %
KETONES UR STRIP.AUTO-MCNC: NEGATIVE MG/DL
LDLC SERPL CALC-MCNC: 139 MG/DL (ref ?–100)
LEUKOCYTE ESTERASE UR QL STRIP.AUTO: NEGATIVE
LYMPHOCYTES # BLD AUTO: 1.56 X10(3) UL (ref 1–4)
LYMPHOCYTES NFR BLD AUTO: 25.1 %
MCH RBC QN AUTO: 30.9 PG (ref 26–34)
MCHC RBC AUTO-ENTMCNC: 34.3 G/DL (ref 31–37)
MCV RBC AUTO: 90.2 FL
MONOCYTES # BLD AUTO: 0.48 X10(3) UL (ref 0.1–1)
MONOCYTES NFR BLD AUTO: 7.7 %
NEUTROPHILS # BLD AUTO: 3.99 X10 (3) UL (ref 1.5–7.7)
NEUTROPHILS # BLD AUTO: 3.99 X10(3) UL (ref 1.5–7.7)
NEUTROPHILS NFR BLD AUTO: 64.1 %
NITRITE UR QL STRIP.AUTO: NEGATIVE
NONHDLC SERPL-MCNC: 154 MG/DL (ref ?–130)
OSMOLALITY SERPL CALC.SUM OF ELEC: 295 MOSM/KG (ref 275–295)
PH UR STRIP.AUTO: 6 [PH] (ref 5–8)
PLATELET # BLD AUTO: 285 10(3)UL (ref 150–450)
POTASSIUM SERPL-SCNC: 4.2 MMOL/L (ref 3.5–5.1)
PROT SERPL-MCNC: 7.1 G/DL (ref 5.7–8.2)
RBC # BLD AUTO: 4.69 X10(6)UL
SODIUM SERPL-SCNC: 141 MMOL/L (ref 136–145)
SP GR UR STRIP.AUTO: 1.03 (ref 1–1.03)
TRIGL SERPL-MCNC: 85 MG/DL (ref 30–149)
TSI SER-ACNC: 1.31 UIU/ML (ref 0.55–4.78)
UROBILINOGEN UR STRIP.AUTO-MCNC: NORMAL MG/DL
VLDLC SERPL CALC-MCNC: 16 MG/DL (ref 0–30)
WBC # BLD AUTO: 6.2 X10(3) UL (ref 4–11)

## 2025-01-18 PROCEDURE — 36415 COLL VENOUS BLD VENIPUNCTURE: CPT

## 2025-01-18 PROCEDURE — 80061 LIPID PANEL: CPT

## 2025-01-18 PROCEDURE — 81001 URINALYSIS AUTO W/SCOPE: CPT

## 2025-01-18 PROCEDURE — 80053 COMPREHEN METABOLIC PANEL: CPT

## 2025-01-18 PROCEDURE — 84443 ASSAY THYROID STIM HORMONE: CPT

## 2025-01-18 PROCEDURE — 85025 COMPLETE CBC W/AUTO DIFF WBC: CPT

## 2025-05-09 ENCOUNTER — HOSPITAL ENCOUNTER (OUTPATIENT)
Dept: GENERAL RADIOLOGY | Facility: HOSPITAL | Age: 45
Discharge: HOME | End: 2025-05-09
Attending: INTERNAL MEDICINE
Payer: COMMERCIAL

## 2025-05-09 DIAGNOSIS — C62.91 CANCER, TESTIS, SEMINOMA, RIGHT (HCC): ICD-10-CM

## 2025-05-09 PROCEDURE — 71046 X-RAY EXAM CHEST 2 VIEWS: CPT | Performed by: INTERNAL MEDICINE

## 2025-05-10 ENCOUNTER — HOSPITAL ENCOUNTER (OUTPATIENT)
Dept: CT IMAGING | Facility: HOSPITAL | Age: 45
Discharge: HOME | End: 2025-05-10
Attending: INTERNAL MEDICINE
Payer: COMMERCIAL

## 2025-05-10 DIAGNOSIS — C62.91 CANCER, TESTIS, SEMINOMA, RIGHT (HCC): ICD-10-CM

## 2025-05-10 PROCEDURE — 74176 CT ABD & PELVIS W/O CONTRAST: CPT | Performed by: INTERNAL MEDICINE

## 2025-05-14 ENCOUNTER — OFFICE VISIT (OUTPATIENT)
Age: 45
End: 2025-05-14
Attending: INTERNAL MEDICINE
Payer: COMMERCIAL

## 2025-05-14 VITALS
RESPIRATION RATE: 18 BRPM | DIASTOLIC BLOOD PRESSURE: 85 MMHG | BODY MASS INDEX: 31.94 KG/M2 | TEMPERATURE: 98 F | HEART RATE: 91 BPM | OXYGEN SATURATION: 97 % | WEIGHT: 213.19 LBS | SYSTOLIC BLOOD PRESSURE: 121 MMHG | HEIGHT: 68.5 IN

## 2025-05-14 DIAGNOSIS — G89.29 CHRONIC MIDLINE LOW BACK PAIN WITHOUT SCIATICA: ICD-10-CM

## 2025-05-14 DIAGNOSIS — T45.1X5A CHEMOTHERAPY-INDUCED PERIPHERAL NEUROPATHY (HCC): ICD-10-CM

## 2025-05-14 DIAGNOSIS — C62.91 SEMINOMA OF RIGHT TESTIS (HCC): ICD-10-CM

## 2025-05-14 DIAGNOSIS — C62.91 CANCER, TESTIS, SEMINOMA, RIGHT (HCC): Primary | ICD-10-CM

## 2025-05-14 DIAGNOSIS — M54.50 CHRONIC MIDLINE LOW BACK PAIN WITHOUT SCIATICA: ICD-10-CM

## 2025-05-14 DIAGNOSIS — G62.0 CHEMOTHERAPY-INDUCED PERIPHERAL NEUROPATHY (HCC): ICD-10-CM

## 2025-05-14 LAB
AFP-TM SERPL-MCNC: <2.2 NG/ML (ref ?–8)
ALBUMIN SERPL-MCNC: 5 G/DL (ref 3.2–4.8)
ALBUMIN/GLOB SERPL: 2.1 {RATIO} (ref 1–2)
ALP LIVER SERPL-CCNC: 52 U/L (ref 45–117)
ALT SERPL-CCNC: 21 U/L (ref 10–49)
ANION GAP SERPL CALC-SCNC: 9 MMOL/L (ref 0–18)
AST SERPL-CCNC: 19 U/L (ref ?–34)
BASOPHILS # BLD AUTO: 0.03 X10(3) UL (ref 0–0.2)
BASOPHILS NFR BLD AUTO: 0.4 %
BILIRUB SERPL-MCNC: 0.3 MG/DL (ref 0.3–1.2)
BUN BLD-MCNC: 28 MG/DL (ref 9–23)
CALCIUM BLD-MCNC: 9.4 MG/DL (ref 8.7–10.6)
CHLORIDE SERPL-SCNC: 107 MMOL/L (ref 98–112)
CO2 SERPL-SCNC: 24 MMOL/L (ref 21–32)
CREAT BLD-MCNC: 1.2 MG/DL (ref 0.7–1.3)
EGFRCR SERPLBLD CKD-EPI 2021: 77 ML/MIN/1.73M2 (ref 60–?)
EOSINOPHIL # BLD AUTO: 0.22 X10(3) UL (ref 0–0.7)
EOSINOPHIL NFR BLD AUTO: 3 %
ERYTHROCYTE [DISTWIDTH] IN BLOOD BY AUTOMATED COUNT: 12.9 %
GLOBULIN PLAS-MCNC: 2.4 G/DL (ref 2–3.5)
GLUCOSE BLD-MCNC: 94 MG/DL (ref 70–99)
HCT VFR BLD AUTO: 41.6 % (ref 39–53)
HGB BLD-MCNC: 14.8 G/DL (ref 13–17.5)
IMM GRANULOCYTES # BLD AUTO: 0.02 X10(3) UL (ref 0–1)
IMM GRANULOCYTES NFR BLD: 0.3 %
LDH SERPL L TO P-CCNC: 149 U/L (ref 120–246)
LYMPHOCYTES # BLD AUTO: 1.5 X10(3) UL (ref 1–4)
LYMPHOCYTES NFR BLD AUTO: 20.6 %
MCH RBC QN AUTO: 30.6 PG (ref 26–34)
MCHC RBC AUTO-ENTMCNC: 35.6 G/DL (ref 31–37)
MCV RBC AUTO: 86.1 FL (ref 80–100)
MONOCYTES # BLD AUTO: 0.49 X10(3) UL (ref 0.1–1)
MONOCYTES NFR BLD AUTO: 6.7 %
NEUTROPHILS # BLD AUTO: 5.03 X10 (3) UL (ref 1.5–7.7)
NEUTROPHILS # BLD AUTO: 5.03 X10(3) UL (ref 1.5–7.7)
NEUTROPHILS NFR BLD AUTO: 69 %
OSMOLALITY SERPL CALC.SUM OF ELEC: 295 MOSM/KG (ref 275–295)
PLATELET # BLD AUTO: 281 10(3)UL (ref 150–450)
POTASSIUM SERPL-SCNC: 4.5 MMOL/L (ref 3.5–5.1)
PROT SERPL-MCNC: 7.4 G/DL (ref 5.7–8.2)
RBC # BLD AUTO: 4.83 X10(6)UL (ref 4.3–5.7)
SODIUM SERPL-SCNC: 140 MMOL/L (ref 136–145)
WBC # BLD AUTO: 7.3 X10(3) UL (ref 4–11)

## 2025-05-14 NOTE — PROGRESS NOTES
Outpatient Oncology Care Plan  Problem list:  knowledge deficit  Fatigue  neuropathy     Problems related to:    chemotherapy     Interventions:  provided general teaching     Expected outcomes:  understands plan of care     Progress towards outcome:  making progress     Education Record     Learner:  Patient  Barriers / Limitations:  None  Method:  Brief focused  Outcome:  Shows understanding  Comments: 6 month f/up testicular ca. States he is feeling well. Looking to discuss scans. No change in neuropathy in feet. States he had completed PT for back pain but this did not improve it. Had MRI and saw pain doctor.

## 2025-05-14 NOTE — PROGRESS NOTES
Hematology/Oncology Clinic Follow Up Visit    Patient Name: Jesús Glez  Medical Record Number: [id number]   YOB: 1980    PCP: Dr. Olivier Baig  Other providers: Dr. 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  -12/15/20- ultrasound scrotum- The right testicle is abnormal, with heterogeneous echotexture and multiple solid-appearing nodular lesions (2 largest foci measuring 1.9 x 1.5 x 1.5 cm and 1.3 x 1.2 x 1.3 cm).  There is some slight increased vascularity to the right testicle as well compared to the left.  -1/13/21- right radical orchiectomy (Dr. Moreno)- pathology consistent with classic seminoma.  Tumor nodules measure 1.8 and 1.5 cm respectively.  No definite lymphatic/vascular invasion.  Spermatic cord, epididymis, and tunica allodynia/vaginalis are all negative for tumor involvement.  Negative margins. pT1a  -1/29/21- CT c/a/p (no contrast)- Several subcentimeter pulmonary nodules, mostly subpleural and pleural based nodules are identified. Development of retroperitoneal adenopathy (several periaortic nodes, measuring up to 1.1 cm) not seen on prior CT of the abdomen 12/13/2018.  Small 5 mm nodule anterior to the right psoas muscle at the level of the iliac crest.   -2/15/21- cycle 1 BEP (bleomycin 30 units IV D1,8,15, etoposide 100mg/m² IV D1-5, cisplatin 20mg/m² D1-5, Pegfilgrastim D6; q21 days)    -D8 bleomycin c/b hypersensitivity reaction (dyspnea, uvulitis).  Bleomycin omitted on D15 and for future cycles   -3/8/21- cycle 2 EP (dosed same as C1 but omission of bleo)  -3/29/21- cycle 3 EP  -4/19/21- cycle 4 EP  -5/7/21- CT c/a/p- Favorable  changes. Near complete resolution of tiny and small pulmonary nodules.  Retroperitoneal adenopathy has improved- a few scattered tiny lymph nodes are present, none are enlarged by size criteria. Stable mild splenomegaly.  No new sites of metastatic disease.      -7/10/21- CT chest- No evidence of new or progressive disease in the chest.  Stable noncalcified pulmonary nodules in the lungs measuring up to 4 mm. Splenomegaly.  -9/11/21- CT c/a/p- Stable overall.  Few tiny subpleural nodules ranging in size from 2-4 mm are unchanged. No new pulmonary nodule. Residual thymus noted in the anterior superior mediastinum unchanged. Splenomegaly. Normal caliber lymph nodes in the retroperitoneum and pelvis.    -11/13/21- CT chest- No evidence of recurrence.  No lymphadenopathy.  No suspicious pulmonary mass.  There are few faint pleural versus subpleural nodules in the lungs bilaterally which are stable ranging in size from 2-3 mm.  -1/15/22- CT c/a/p- Stable CT with tiny subpleural nodules unchanged from the previous exam.  No new or progressive metastatic disease. Mild splenomegaly is slightly improved. Stable sclerotic osseous foci within the thoracic vertebral bodies.   -3/12/22- CT chest- Stable benign appearance.  No evidence for metastatic disease to the chest.   -5/14/22- CT c/a/p-  No suspicious nodule, mass or consolidation lung parenchyma. No lymphadenopathy.  Residual thymic tissue in the anterior mediastinum is stable.  -8/13/22- CT chest- no evidence of malignancy  -11/19/22- CT c/a/p-  Stable appearance, with no evidence for developing malignant disease.  -2/11/23- CT chest- Stable tiny nodularity.  No evidence of disease recurrence.  -5/13/23- CT c/a/p- stable, no evidence of metastatic disease recurrence  -5/10/24- CXR- normal  -5/11/24- CT a/p- No evidence of metastatic disease in the abdomen and pelvis. Incidental left scrotal varicocele is noted.  Nonspecific inflammatory change around the celiac axis is  noted.   -5/9/25- CXR- normal  -5/10/25- CT a/p- No evidence of metastatic disease.  No acute abnormality in the abdomen and pelvis.     ================================  Interval events: Presents for follow-up.  He is now 4 years out from the end of treatment.  Repeat imaging shows no evidence of disease recurrence as above.    At the time of his last visit with me he was having some chronic lower midline back pain.  He underwent physical therapy for this with only mild improvement.  An MRI was performed as ordered by his PCP which showed a bulging disc.  He completed a Medrol Dosepak which helped significantly but only transiently.  He then saw pain clinic.  Ultimately decided to hold off on any intervention at this time, though if worsened could consider bilateral lumbar medial branch block and possible RFA.  He takes Aleve prn.  Jesús continues to do some of the physical therapy exercises and stretches with partial relief.  No neurologic changes since last visit.  He denies any bowel or bladder changes.    Baseline peripheral neuropathy and tinnitus remains stable. Neuropathy in feet is bothersome when he walks for longer distances but he has not felt it is bothersome enough to warrant trying any medication for it.    No chest pain, cough, or dyspnea.  No recent fevers or infections.  No bowel or bladder changes.  No bleeding.    Jesús reports having some asymptomatic episodes of tachycardia with his heart rate in the 130s with minimal or no activity.  He notices this only because of his smart watch.    Past Medical History:  Past Medical History:    Acute pharyngitis    Allergic rhinitis    Bronchitis    Cancer, testis, seminoma, right (HCC)    Dermatitis    Esophageal reflux    History of blood transfusion    2021    Hx of motion sickness    Personal history of antineoplastic chemotherapy    2021 - testicular cancer    Pneumonia due to organism    Tachycardia    \"Mild tachycardia\" since chemotherapy    Visual  impairment    glasses     Past Surgical History:   Procedure Laterality Date    Hernia surgery  07/2019    Orchiectomy   Right 01/13/2021     Home Medications:   WAL-ITIN D 24 HOUR  MG Oral Tablet 24 Hr TAKE ONE TABLET BY MOUTH EVERY DAY. 90 tablet 1    Fluticasone Propionate 50 MCG/ACT Nasal Suspension 2 sprays by Nasal route daily. 3 each 3    hydrOXYzine 10 MG Oral Tab TAKE 1 TABLET BY MOUTH EVERY NIGHT AT BEDTIME 90 tablet 3    Calcium Carbonate Antacid 500 MG Oral Chew Tab Chew 1 tablet (500 mg total) by mouth as needed.      ONE-A-DAY MENS OR Take 1 tablet by mouth in the morning.       Allergies:   Allergies   Allergen Reactions    Bleomycin SHORTNESS OF BREATH and Tightness in Throat    Nuts ANAPHYLAXIS    Other ANAPHYLAXIS     Beans, legumes    Radiology Contrast Iodinated Dyes OTHER (SEE COMMENTS)     Swollen uvula       Psychosocial History:  Social History     Social History Narrative    Single, lives alone.  Works as an .  No children.  Has a brother who lives in [place].  His parents live in [place].     Social History     Socioeconomic History    Marital status: Single   Tobacco Use    Smoking status: Never    Smokeless tobacco: Never   Vaping Use    Vaping status: Never Used   Substance and Sexual Activity    Alcohol use: Yes     Comment: rarely    Drug use: No   Other Topics Concern    Caffeine Concern Yes     Comment: 3-4 per day    Exercise No   Social History Narrative    Single, lives alone.  Works as an .  No children.  Has a brother who lives in [place].  His parents live in [place].     Family Medical History:  Family History   Problem Relation Age of Onset    Heart Disease Father 61        angioplasty    Cancer Maternal Grandmother 59        colon    Cancer Maternal Grandfather 88        pancreatic     Review of Systems:  A 10-point ROS was done with pertinent positives and negative per the HPI    Vital Signs:  Height: 174 cm (5' 8.5\") (05/14  1023)  Weight: 96.7 kg (213 lb 3.2 oz) (05/14 1023)  BSA (Calculated - sq m): 2.11 sq meters (05/14 1023)  Pulse: 91 (05/14 1023)  BP: 121/85 (05/14 1023)  Temp: 97.7 °F (36.5 °C) (05/14 1023)  Do Not Use - Resp Rate: --  SpO2: 97 % (05/14 1023)    Wt Readings from Last 6 Encounters:   05/14/25 96.7 kg (213 lb 3.2 oz)   12/30/24 95.3 kg (210 lb)   11/14/24 94.9 kg (209 lb 3.2 oz)   05/15/24 93.4 kg (206 lb)   12/28/23 91.2 kg (201 lb)   11/15/23 93.7 kg (206 lb 8 oz)     ECOG PS: 1    Physical Examination:  General: Patient is alert and oriented, not in acute distress  Psych: Mood and affect are appropriate  Eyes: EOMI  ENT: Oropharynx is clear  CV: regular rhythm, no murmurs, no LE edema.   Respiratory: Lungs clear to auscultation bilaterally  GI/Abd: Soft, non-tender with normoactive bowel sounds, no hepatosplenomegaly.   Neurological: Grossly intact   Lymphatics: No palpable lymphadenopathy  Skin: No petechiae or rashes  - single testis, no abnormalities noted    Laboratory:  Lab Results   Component Value Date    WBC 6.2 01/18/2025    WBC 7.5 11/14/2024    WBC 8.5 05/15/2024    HGB 14.5 01/18/2025    HGB 14.2 11/14/2024    HGB 14.9 05/15/2024    HCT 42.3 01/18/2025    MCV 90.2 01/18/2025    MCH 30.9 01/18/2025    MCHC 34.3 01/18/2025    RDW 13.2 01/18/2025    .0 01/18/2025    .0 11/14/2024    .0 05/15/2024     Lab Results   Component Value Date    GLU 91 01/18/2025    BUN 23 01/18/2025    BUNCREA 14.7 07/14/2021    CREATSERUM 1.21 01/18/2025    CREATSERUM 1.17 11/14/2024    CREATSERUM 1.20 05/15/2024    ANIONGAP 6 01/18/2025    GFR 83 02/05/2020    GFRNAA 84 05/18/2022    GFRAA 97 05/18/2022    CA 9.4 01/18/2025    OSMOCALC 295 01/18/2025    ALKPHO 43 (L) 01/18/2025    AST 16 01/18/2025    ALT 14 01/18/2025    BILT 0.6 01/18/2025    TP 7.1 01/18/2025    ALB 4.8 01/18/2025    GLOBULIN 2.3 01/18/2025     01/18/2025    K 4.2 01/18/2025     01/18/2025    CO2 30.0 01/18/2025     Lab  Results   Component Value Date    INR 1.0 05/19/2021     Lab Results   Component Value Date     11/14/2024     05/15/2024     11/15/2023     05/17/2023     02/15/2023     11/23/2022     08/17/2022     05/18/2022     03/16/2022     01/17/2022     11/15/2021     09/15/2021     07/14/2021     (H) 05/10/2021     02/04/2021     Lab Results   Component Value Date    AFPTM <2.2 11/14/2024    AFPTM 1.7 05/15/2024    AFPTM 1.8 11/15/2023    AFPTM 1.7 05/17/2023    AFPTM 1.9 02/15/2023    AFPTM 1.7 11/23/2022    AFPTM 1.7 08/17/2022    AFPTM 1.8 05/18/2022    AFPTM 1.8 03/16/2022    AFPTM 1.9 01/17/2022    AFPTM 1.7 11/15/2021    AFPTM 1.7 09/15/2021    AFPTM 1.9 07/14/2021    AFPTM 3.3 05/10/2021    AFPTM 1.6 02/04/2021    AFPTM 2.8 01/06/2021     Lab Results   Component Value Date    HCGQUANT <1 11/14/2024    HCGQUANT <1 05/15/2024    HCGQUANT <1 11/15/2023    HCGQUANT <1 05/17/2023    HCGQUANT <1 02/15/2023    HCGQUANT <1 11/23/2022    HCGQUANT <1 08/17/2022    HCGQUANT <1 05/18/2022    HCGQUANT <1 03/16/2022    HCGQUANT <1 01/17/2022    HCGQUANT <1 11/15/2021    HCGQUANT <1 09/15/2021    HCGQUANT <1 07/14/2021    HCGQUANT <1 05/10/2021    HCGQUANT <1 02/04/2021    HCGQUANT <1 01/06/2021     Imaging:    MRI lumbar 12/30/24: CONCLUSION:    1. No acute process.   2. There is mild multilevel facet arthropathy.   3. Mild annular disc bulging at L4-5.   4. No significant stenosis within the lumbar spine noted.   5. Normal appearance of the visualized aspects of the lower spinal cord, conus and cauda equina.     PFTs 2/13/21:  FVC 4.74L (90% pred)    FEV1 4.24L (102% pred)    FEV1/FVC 81%    DLCO 88% pred     Impression & Plan:     *Seminoma of right testis, stage IIIA   -Enlarged retroperitoneal lymph nodes appear to be involved by malignancy, also has multiple subcentimeter pulmonary nodules likely represent early pulmonary  metastases.      -Started BEP on 2/15/2021. C/b CINV with acid reflux and diarrhea.  Day 8 bleomycin complicated by hypersensitivity reaction and therefore switched to EP, completed 4 cycles.  End of treatment imaging on 5/7/21 showed no significant persistent disease.    -No evidence of recurrent disease at this time.  Currently 4 yrs out from treatment-   -Continue surveillance as below:    Year 5 -clinic visits with tumor markers annually   -CXR annually  -We have discussed the importance of lifestyle modifications to minimize cardiovascular risk going forward.   I have encouraged him to exercise regularly    *Subcentimeter pulmonary nodules  -Stable on multiple prior CT scans, transitioned to chest x-ray surveillance per routine seminoma surveillance guidelines as above    *Chemotherapy induced neuropathy and tinnitus  -Due to prior cisplatin exposure.  At this point he is >2 years out from treatment and therefore I advised that his the current degree of his residual neuropathy is likely permanent.  Neuropathy is not very painful, he has not wanted to try gabapentin in the past.    *Midline lower back pain- due to disc bulge at L4-5  -Only partial improvement with physical therapy.  -Seen by pain clinic and was advised that if worsens could consider bilateral lumbar medial branch block and possible RFA.  He takes Aleve prn and continues exercises and stretches as learned by PT     *Episodic tachycardia  -He notes asymptomatic episodes of HR in 130s with minimal or no activity.  Consider seeing cardiology for further evaluation and possible Holter monitor assessment if he wishes.    RTC in 12 months    Abdi Acosta MD  Hematology/Medical Oncology  [facility]     ongoing continuity of care related to malignancy.

## 2025-05-15 LAB — HCG BETA SUBUNIT TUMOR MARKER QN: <1 MIU/ML

## 2025-08-19 ENCOUNTER — OFFICE VISIT (OUTPATIENT)
Dept: INTERNAL MEDICINE CLINIC | Facility: CLINIC | Age: 45
End: 2025-08-19

## 2025-08-19 ENCOUNTER — LAB ENCOUNTER (OUTPATIENT)
Dept: LAB | Age: 45
End: 2025-08-19

## 2025-08-19 VITALS
BODY MASS INDEX: 31.76 KG/M2 | TEMPERATURE: 98 F | RESPIRATION RATE: 18 BRPM | WEIGHT: 212 LBS | HEIGHT: 68.5 IN | OXYGEN SATURATION: 99 % | HEART RATE: 103 BPM | SYSTOLIC BLOOD PRESSURE: 118 MMHG | DIASTOLIC BLOOD PRESSURE: 66 MMHG

## 2025-08-19 DIAGNOSIS — Z85.47 HISTORY OF TESTICULAR CANCER: ICD-10-CM

## 2025-08-19 DIAGNOSIS — R07.9 CHEST PAIN, UNSPECIFIED TYPE: Primary | ICD-10-CM

## 2025-08-19 DIAGNOSIS — R00.2 PALPITATIONS: ICD-10-CM

## 2025-08-19 DIAGNOSIS — R06.09 DYSPNEA ON EXERTION: ICD-10-CM

## 2025-08-19 DIAGNOSIS — T45.1X5A CHEMOTHERAPY-INDUCED PERIPHERAL NEUROPATHY (HCC): ICD-10-CM

## 2025-08-19 DIAGNOSIS — G62.0 CHEMOTHERAPY-INDUCED PERIPHERAL NEUROPATHY (HCC): ICD-10-CM

## 2025-08-19 LAB
ALBUMIN SERPL-MCNC: 4.9 G/DL (ref 3.2–4.8)
ALBUMIN/GLOB SERPL: 2 (ref 1–2)
ALP LIVER SERPL-CCNC: 52 U/L (ref 45–117)
ALT SERPL-CCNC: 29 U/L (ref 10–49)
ANION GAP SERPL CALC-SCNC: 11 MMOL/L (ref 0–18)
AST SERPL-CCNC: 25 U/L (ref ?–34)
ATRIAL RATE: 85 BPM
BASOPHILS # BLD AUTO: 0.04 X10(3) UL (ref 0–0.2)
BASOPHILS NFR BLD AUTO: 0.5 %
BILIRUB SERPL-MCNC: 0.3 MG/DL (ref 0.3–1.2)
BUN BLD-MCNC: 17 MG/DL (ref 9–23)
CALCIUM BLD-MCNC: 9.7 MG/DL (ref 8.7–10.6)
CHLORIDE SERPL-SCNC: 103 MMOL/L (ref 98–112)
CO2 SERPL-SCNC: 28 MMOL/L (ref 21–32)
CREAT BLD-MCNC: 1.18 MG/DL (ref 0.7–1.3)
EGFRCR SERPLBLD CKD-EPI 2021: 78 ML/MIN/1.73M2 (ref 60–?)
EOSINOPHIL # BLD AUTO: 0.15 X10(3) UL (ref 0–0.7)
EOSINOPHIL NFR BLD AUTO: 2 %
ERYTHROCYTE [DISTWIDTH] IN BLOOD BY AUTOMATED COUNT: 12.7 %
FASTING STATUS PATIENT QL REPORTED: NO
GLOBULIN PLAS-MCNC: 2.5 G/DL (ref 2–3.5)
GLUCOSE BLD-MCNC: 83 MG/DL (ref 70–99)
HCT VFR BLD AUTO: 42.6 % (ref 39–53)
HGB BLD-MCNC: 14.4 G/DL (ref 13–17.5)
IMM GRANULOCYTES # BLD AUTO: 0.02 X10(3) UL (ref 0–1)
IMM GRANULOCYTES NFR BLD: 0.3 %
LYMPHOCYTES # BLD AUTO: 1.89 X10(3) UL (ref 1–4)
LYMPHOCYTES NFR BLD AUTO: 24.6 %
MCH RBC QN AUTO: 29.9 PG (ref 26–34)
MCHC RBC AUTO-ENTMCNC: 33.8 G/DL (ref 31–37)
MCV RBC AUTO: 88.4 FL (ref 80–100)
MONOCYTES # BLD AUTO: 0.64 X10(3) UL (ref 0.1–1)
MONOCYTES NFR BLD AUTO: 8.3 %
NEUTROPHILS # BLD AUTO: 4.95 X10 (3) UL (ref 1.5–7.7)
NEUTROPHILS # BLD AUTO: 4.95 X10(3) UL (ref 1.5–7.7)
NEUTROPHILS NFR BLD AUTO: 64.3 %
OSMOLALITY SERPL CALC.SUM OF ELEC: 295 MOSM/KG (ref 275–295)
P AXIS: 22 DEGREES
P-R INTERVAL: 170 MS
PLATELET # BLD AUTO: 327 10(3)UL (ref 150–450)
POTASSIUM SERPL-SCNC: 4 MMOL/L (ref 3.5–5.1)
PROT SERPL-MCNC: 7.4 G/DL (ref 5.7–8.2)
Q-T INTERVAL: 358 MS
QRS DURATION: 88 MS
QTC CALCULATION (BEZET): 426 MS
R AXIS: -6 DEGREES
RBC # BLD AUTO: 4.82 X10(6)UL (ref 4.3–5.7)
SODIUM SERPL-SCNC: 142 MMOL/L (ref 136–145)
T AXIS: 42 DEGREES
TSI SER-ACNC: 1.35 UIU/ML (ref 0.55–4.78)
VENTRICULAR RATE: 85 BPM
WBC # BLD AUTO: 7.7 X10(3) UL (ref 4–11)

## 2025-08-19 PROCEDURE — 3074F SYST BP LT 130 MM HG: CPT

## 2025-08-19 PROCEDURE — 93000 ELECTROCARDIOGRAM COMPLETE: CPT

## 2025-08-19 PROCEDURE — G2211 COMPLEX E/M VISIT ADD ON: HCPCS

## 2025-08-19 PROCEDURE — 80050 GENERAL HEALTH PANEL: CPT

## 2025-08-19 PROCEDURE — 3008F BODY MASS INDEX DOCD: CPT

## 2025-08-19 PROCEDURE — 99214 OFFICE O/P EST MOD 30 MIN: CPT

## 2025-08-19 PROCEDURE — 3078F DIAST BP <80 MM HG: CPT

## 2025-08-29 ENCOUNTER — HOSPITAL ENCOUNTER (OUTPATIENT)
Dept: CV DIAGNOSTICS | Facility: HOSPITAL | Age: 45
Discharge: HOME | End: 2025-08-29

## 2025-08-29 DIAGNOSIS — R00.2 PALPITATIONS: ICD-10-CM

## 2025-08-29 DIAGNOSIS — R06.09 DYSPNEA ON EXERTION: ICD-10-CM

## 2025-08-29 PROCEDURE — 93243 EXT ECG>48HR<7D SCAN A/R: CPT

## 2025-08-29 PROCEDURE — 93242 EXT ECG>48HR<7D RECORDING: CPT

## (undated) DEVICE — STRL PENROSE DRAIN 18" X 1/4": Brand: CARDINAL HEALTH

## (undated) DEVICE — VAGINAL PACKING: Brand: DEROYAL

## (undated) DEVICE — SUTURE VICRYL 0

## (undated) DEVICE — NON-ADHERENT PAD PREPACK: Brand: TELFA

## (undated) DEVICE — STERILE POLYISOPRENE POWDER-FREE SURGICAL GLOVES: Brand: PROTEXIS

## (undated) DEVICE — SUPPORTER ATHL LG LG SPNS SPRT

## (undated) DEVICE — SUTURE VICRYL 2-0

## (undated) DEVICE — VIOLET BRAIDED (POLYGLACTIN 910), SYNTHETIC ABSORBABLE SUTURE: Brand: COATED VICRYL

## (undated) DEVICE — GAMMEX® PI HYBRID SIZE 7.5, STERILE POWDER-FREE SURGICAL GLOVE, POLYISOPRENE AND NEOPRENE BLEND: Brand: GAMMEX

## (undated) DEVICE — SOL NACL IRRIG 0.9% 1000ML BTL

## (undated) DEVICE — GAUZE TRAY STERILE 4X4 12PLY

## (undated) DEVICE — SUTURE MONOCRYL 3-0 PS-2

## (undated) DEVICE — 3M™ TEGADERM™ TRANSPARENT FILM DRESSING, 1626W, 4 IN X 4-3/4 IN (10 CM X 12 CM), 50 EACH/CARTON, 4 CARTON/CASE: Brand: 3M™ TEGADERM™

## (undated) DEVICE — SUTURE VICRYL 3-0 RB-1

## (undated) DEVICE — SUTURE POLYESTER 1 CT-1

## (undated) DEVICE — PROXIMATE SKIN STAPLERS (35 WIDE) CONTAINS 35 STAINLESS STEEL STAPLES (FIXED HEAD): Brand: PROXIMATE

## (undated) DEVICE — ADHESIVE MASTISOL 2/3CC VL

## (undated) DEVICE — KENDALL SCD EXPRESS SLEEVES, KNEE LENGTH, MEDIUM: Brand: KENDALL SCD

## (undated) DEVICE — SUTURE SILK 0 SH

## (undated) DEVICE — 1.6MM TAPER SIDE CUTTING CARBIDE BUR

## (undated) DEVICE — REM POLYHESIVE ADULT PATIENT RETURN ELECTRODE: Brand: VALLEYLAB

## (undated) DEVICE — SUTURE VICRYL 4-0 RB-1

## (undated) DEVICE — 3M™ STERI-STRIP™ REINFORCED ADHESIVE SKIN CLOSURES, R1547, 1/2 IN X 4 IN (12 MM X 100 MM), 6 STRIPS/ENVELOPE: Brand: 3M™ STERI-STRIP™

## (undated) DEVICE — HEAD & NECK: Brand: MEDLINE INDUSTRIES, INC.

## (undated) DEVICE — SOL  .9 1000ML BTL

## (undated) DEVICE — SUTURE VICRYL 3-0 SH

## (undated) DEVICE — LAPAROTOMY CDS: Brand: MEDLINE INDUSTRIES, INC.

## (undated) DEVICE — PREMIUM WET SKIN PREP TRAY: Brand: MEDLINE INDUSTRIES, INC.

## (undated) DEVICE — SUTURE SILK 0

## (undated) DEVICE — SPONGE: SPECIALTY PEANUT XR 100/CS: Brand: MEDICAL ACTION INDUSTRIES

## (undated) DEVICE — SUTURE PDS II 1 CT-1

## (undated) DEVICE — SUPER SPONGES,MEDIUM: Brand: KERLIX

## (undated) DEVICE — SUCTION CANISTER, 3000CC,SAFELINER: Brand: DEROYAL

## (undated) DEVICE — SUTURE SILK 1 SH

## (undated) DEVICE — SYRINGE BULB 50/CS 48/PLT: Brand: MEDEGEN MEDICAL PRODUCTS, LLC

## (undated) DEVICE — TOWEL SURG OR 17X30IN BLUE

## (undated) DEVICE — SUTURE MONOCRYL 4-0 PS-2

## (undated) DEVICE — 3M™ STERI-STRIP™ REINFORCED ADHESIVE SKIN CLOSURES, R1541, 1/4 IN X 3 IN (6 MM X 75 MM), 3 STRIPS/ENVELOPE: Brand: 3M™ STERI-STRIP™

## (undated) NOTE — LETTER
Patient Name: Zari Saaevdra  : 1980  MRN: [id number]  Patient Address: [address] [place] [address]      Coronavirus Disease 2019 (COVID-19)     [facility] is committed to the safety and well-being of our patients, members, 2. Monitor your symptoms carefully. If your symptoms get worse, call your healthcare provider immediately. 3. Get rest and stay hydrated.    4. If you have a medical appointment, call the healthcare provider ahead of time and tell them that you have or may ? At least 24 hours have passed since recovery defined as resolution of fever without the use of fever-reducing medications; and  · Improvement in respiratory symptoms (e.g., cough, shortness of breath); and  · At least 10 days have passed since symptoms f If you would be interested in donating your plasma to help treat others diagnosed with the virus, please contact [employer] directly on their website: [email or web address]    Who is eligible to donate convalescent plasma?

## (undated) NOTE — ED AVS SNAPSHOT
Carrillo Mayes   MRN: [id number]    Department:  [facility] Emergency Department   Date of Visit:  12/13/2018           Disclosure     Insurance plans vary and the physician(s) referred by the ER may not be covered by your plan.  Please co tell this physician (or your personal doctor if your instructions are to return to your personal doctor) about any new or lasting problems. The primary care or specialist physician will see patients referred from the [facility] Emergency Department.  Shelton Lombard

## (undated) NOTE — LETTER
07/24/19    Dear Vane Anderson MD,    I am seeing Faye Kylee in the office after hernia repair.        Assessment   Follow-up examination  (primary encounter diagnosis)  Ventral hernia without obstruction or gangrene      Plan   Patient is luc

## (undated) NOTE — ED AVS SNAPSHOT
Reva Pace   MRN: [id number]    Department:  [facility] Emergency Department   Date of Visit:  11/30/2017           Disclosure     Insurance plans vary and the physician(s) referred by the ER may not be covered by your plan.  Please co tell this physician (or your personal doctor if your instructions are to return to your personal doctor) about any new or lasting problems. The primary care or specialist physician will see patients referred from the [facility] Emergency Department.  Sarwat Morales

## (undated) NOTE — MR AVS SNAPSHOT
[facility] [address]  [phone]                    After Visit Summary   2/21/2021    Jocelyn Rocael Glez    MRN: [id number]           Visit Information     Date & Time  2/21/2021 10: Fluticasone Propionate 50 MCG/ACT Nasal Suspension 2 sprays by Nasal route daily. acetaminophen 500 MG Oral Tab Take 1,000 mg by mouth one time. ONE-A-DAY MENS OR Take 1 tablet by mouth daily.         Diagnoses for This Visit    Cancer, testis, semin

## (undated) NOTE — LETTER
Printed: 2021    Patient Name: [place]  : 1980   Medical Record #: [id number]    Consent to Cancer Treatment    I, Merlene Glez, understand that I have been diagnosed with testicular cancer.     I understand that I understand that I could have side effects from my treatment that are not listed on this form. Each patient can respond differently to medications, and could have side effects that have not been reported by others.  I understand that complications from th I have discussed the nature, purpose and risks of cancer treatment, alternative methods of treatment and the possibility of complications, including, but not limited to death, with the patient and/or the patient’s authorized representative.  I have given th

## (undated) NOTE — MR AVS SNAPSHOT
[facility] [address]  [phone]                    After Visit Summary   3/31/2021    Grace Glez    MRN: [id number]           Visit Information     Date & Time  3/31/2021  8: by mouth every 8 (eight) hours as needed for Nausea. EPINEPHrine 0.3 MG/0.3ML Injection Solution Auto-injector USE AS DIRECTED FOR ANAPHYLAXIS, THEN CALL 911.  Brand or generic ok.    hydrOXYzine HCl 10 MG Oral Tab TAKE 1 TABLET BY MOUTH EVERY NIGHT AT B not eat solid food 2 hours before appointment time. You may drink clear fluids up to 2 hours before appointment time. At any time you may take your medications with a small amount of water.     INSTRUCTIONS FOR PATIENTS DRINKING ORAL CONTRAST:    Your docto Jhoan Brooks 17 can access your MyChart to more actively manage your health care and view more details from this visit by going to [email or web address].   If you've recently had a stay at the Hospital you ca

## (undated) NOTE — MR AVS SNAPSHOT
[place] [address]  [phone]                    After Visit Summary   2/22/2021    Orsourav Da Silvatiny Glez    MRN: [id number]           Visit Information     Date & Time  2/22/2021 11: hydrOXYzine HCl 10 MG Oral Tab TAKE 1 TABLET BY MOUTH EVERY NIGHT AT BEDTIME    Fluticasone Propionate 50 MCG/ACT Nasal Suspension 2 sprays by Nasal route daily. acetaminophen 500 MG Oral Tab Take 1,000 mg by mouth one time.     ONE-A-DAY MENS OR Take 1 Bilirubin, Total 0.6      0.1 - 2.0 mg/dL Final    Total Protein 6.9      6.4 - 8.2 g/dL Final    Albumin 3.7      3.4 - 5.0 g/dL Final    Globulin  3.2      2.8 - 4.4 g/dL Final    A/G Ratio 1.2      1.0 - 2.0  Final    FASTING No        Final       Comp If you've recently had a stay at the Hospital you can access your discharge instructions in [employer] by going to Visits < Admission Summaries.  If you've been to the Emergency Department or your doctor's office, you can view your past visit information in My

## (undated) NOTE — LETTER
Date: 2/21/2018      Patient Name: Sandra Liu          To Whom it may concern: The above patient was seen at the [facility] for treatment of a medical condition.     This patient should be excused from attending work from 2/21/

## (undated) NOTE — Clinical Note
I had the pleasure of seeing Mr. Jack Barker in my office today. Please see my attached note.     Alesha Rhodes